# Patient Record
Sex: FEMALE | Race: WHITE | NOT HISPANIC OR LATINO | Employment: OTHER | URBAN - METROPOLITAN AREA
[De-identification: names, ages, dates, MRNs, and addresses within clinical notes are randomized per-mention and may not be internally consistent; named-entity substitution may affect disease eponyms.]

---

## 2017-04-19 ENCOUNTER — ALLSCRIPTS OFFICE VISIT (OUTPATIENT)
Dept: OTHER | Facility: OTHER | Age: 72
End: 2017-04-19

## 2017-04-19 ENCOUNTER — TRANSCRIBE ORDERS (OUTPATIENT)
Dept: ADMINISTRATIVE | Facility: HOSPITAL | Age: 72
End: 2017-04-19

## 2017-04-19 DIAGNOSIS — R39.15 URGENCY OF URINATION: ICD-10-CM

## 2017-04-19 DIAGNOSIS — N28.1 ACQUIRED CYST OF KIDNEY: Primary | ICD-10-CM

## 2017-04-19 LAB
CLARITY UR: NORMAL
COLOR UR: YELLOW
GLUCOSE (HISTORICAL): NORMAL
HGB UR QL STRIP.AUTO: NORMAL
KETONES UR STRIP-MCNC: NORMAL MG/DL
LEUKOCYTE ESTERASE UR QL STRIP: NORMAL
NITRITE UR QL STRIP: NORMAL
PH UR STRIP.AUTO: 6 [PH]
PROT UR STRIP-MCNC: NORMAL MG/DL
SP GR UR STRIP.AUTO: 1.02

## 2017-09-25 ENCOUNTER — APPOINTMENT (OUTPATIENT)
Dept: LAB | Facility: MEDICAL CENTER | Age: 72
End: 2017-09-25
Payer: COMMERCIAL

## 2017-09-25 ENCOUNTER — OFFICE VISIT (OUTPATIENT)
Dept: URGENT CARE | Facility: MEDICAL CENTER | Age: 72
End: 2017-09-25
Payer: COMMERCIAL

## 2017-09-25 DIAGNOSIS — R35.0 FREQUENCY OF MICTURITION: ICD-10-CM

## 2017-09-25 PROCEDURE — 99213 OFFICE O/P EST LOW 20 MIN: CPT

## 2017-09-25 PROCEDURE — 87086 URINE CULTURE/COLONY COUNT: CPT

## 2017-09-25 PROCEDURE — 87077 CULTURE AEROBIC IDENTIFY: CPT

## 2017-09-25 PROCEDURE — 87186 SC STD MICRODIL/AGAR DIL: CPT

## 2017-09-27 LAB — BACTERIA UR CULT: NORMAL

## 2017-10-10 ENCOUNTER — HOSPITAL ENCOUNTER (OUTPATIENT)
Dept: RADIOLOGY | Facility: MEDICAL CENTER | Age: 72
Discharge: HOME | End: 2017-10-10
Payer: COMMERCIAL

## 2017-10-10 DIAGNOSIS — N28.1 ACQUIRED CYST OF KIDNEY: ICD-10-CM

## 2017-10-10 PROCEDURE — 76770 US EXAM ABDO BACK WALL COMP: CPT

## 2017-11-07 ENCOUNTER — GENERIC CONVERSION - ENCOUNTER (OUTPATIENT)
Dept: OTHER | Facility: OTHER | Age: 72
End: 2017-11-07

## 2018-01-03 ENCOUNTER — GENERIC CONVERSION - ENCOUNTER (OUTPATIENT)
Dept: OTHER | Facility: OTHER | Age: 73
End: 2018-01-03

## 2018-01-03 DIAGNOSIS — R35.0 FREQUENCY OF MICTURITION: ICD-10-CM

## 2018-01-03 DIAGNOSIS — R39.15 URGENCY OF URINATION: ICD-10-CM

## 2018-01-03 DIAGNOSIS — R39.9 UNSPECIFIED SYMPTOMS AND SIGNS INVOLVING THE GENITOURINARY SYSTEM: ICD-10-CM

## 2018-01-12 VITALS
HEIGHT: 63 IN | DIASTOLIC BLOOD PRESSURE: 88 MMHG | SYSTOLIC BLOOD PRESSURE: 126 MMHG | HEART RATE: 72 BPM | BODY MASS INDEX: 22.95 KG/M2 | WEIGHT: 129.5 LBS

## 2018-01-22 VITALS
HEIGHT: 63 IN | BODY MASS INDEX: 23.74 KG/M2 | HEART RATE: 72 BPM | SYSTOLIC BLOOD PRESSURE: 126 MMHG | WEIGHT: 134 LBS | DIASTOLIC BLOOD PRESSURE: 70 MMHG

## 2018-07-17 ENCOUNTER — OFFICE VISIT (OUTPATIENT)
Dept: CARDIOLOGY CLINIC | Facility: CLINIC | Age: 73
End: 2018-07-17
Payer: COMMERCIAL

## 2018-07-17 VITALS
SYSTOLIC BLOOD PRESSURE: 118 MMHG | OXYGEN SATURATION: 97 % | DIASTOLIC BLOOD PRESSURE: 68 MMHG | HEART RATE: 64 BPM | BODY MASS INDEX: 23.19 KG/M2 | WEIGHT: 130.9 LBS

## 2018-07-17 DIAGNOSIS — R06.02 SHORTNESS OF BREATH: ICD-10-CM

## 2018-07-17 DIAGNOSIS — R07.9 CHEST PAIN, UNSPECIFIED TYPE: Primary | ICD-10-CM

## 2018-07-17 PROCEDURE — 93000 ELECTROCARDIOGRAM COMPLETE: CPT | Performed by: INTERNAL MEDICINE

## 2018-07-17 PROCEDURE — 99204 OFFICE O/P NEW MOD 45 MIN: CPT | Performed by: INTERNAL MEDICINE

## 2018-07-17 RX ORDER — BIOTIN 1 MG
1 TABLET ORAL DAILY
COMMUNITY
End: 2020-11-18 | Stop reason: ALTCHOICE

## 2018-07-17 RX ORDER — LUTEIN 6 MG
20 TABLET ORAL DAILY
COMMUNITY
End: 2020-11-18 | Stop reason: ALTCHOICE

## 2018-07-17 RX ORDER — METOPROLOL SUCCINATE 50 MG/1
1 TABLET, EXTENDED RELEASE ORAL
COMMUNITY
End: 2019-04-04 | Stop reason: SDUPTHER

## 2018-07-17 RX ORDER — NIACIN 500 MG
500 TABLET ORAL
COMMUNITY

## 2018-07-17 RX ORDER — AMPICILLIN TRIHYDRATE 250 MG
2 CAPSULE ORAL DAILY
COMMUNITY
Start: 2008-03-12

## 2018-07-17 RX ORDER — BIOTIN 10000 MCG
1 CAPSULE ORAL DAILY
COMMUNITY
End: 2020-11-18 | Stop reason: ALTCHOICE

## 2018-07-17 RX ORDER — PROPRANOLOL/HYDROCHLOROTHIAZID 40 MG-25MG
500 TABLET ORAL DAILY
COMMUNITY
End: 2020-11-18 | Stop reason: ALTCHOICE

## 2018-07-17 RX ORDER — BENAZEPRIL HYDROCHLORIDE 40 MG/1
2 TABLET, FILM COATED ORAL
COMMUNITY
Start: 2018-05-21 | End: 2020-05-26 | Stop reason: SDUPTHER

## 2018-07-17 RX ORDER — ESCITALOPRAM OXALATE 10 MG/1
1 TABLET ORAL
COMMUNITY
End: 2021-05-18 | Stop reason: SDUPTHER

## 2018-07-17 RX ORDER — LANOLIN ALCOHOL/MO/W.PET/CERES
1000 CREAM (GRAM) TOPICAL DAILY
COMMUNITY
End: 2020-11-18 | Stop reason: ALTCHOICE

## 2018-07-17 RX ORDER — CALCIUM CARBONATE/VITAMIN D3 600 MG-10
1 TABLET ORAL DAILY
COMMUNITY
Start: 2008-03-12 | End: 2020-11-18 | Stop reason: ALTCHOICE

## 2018-07-17 NOTE — PROGRESS NOTES
Cardiology   Royal Spann 81 y o  female MRN: [id number]        Impression:  1  Chest pain - concerning for myocardial ischemia  Will want to schedule exercise nuclear stress test to evaluate for myocardial ischemia  2  Hypertension - good control  3  Dyslipidemia - likely will need to transition to statin  Recommendations:  1  Continue current medications  2  Obtain exercise nuclear stress test   3  Follow up in 2 weeks  HPI: Royal Spann is a 81y o  year old female with a history of hypertension and dyslipidemia who presents for evaluation of chest pain  For past 6 months, has been having tightness in chest when exercising - particularly climbing inclines  No dyspnea or palpitations  Lipid profile demonstrated low HDL and elevated LDL  Review of Systems   Constitutional: Negative  HENT: Negative  Eyes: Negative  Respiratory: Positive for chest tightness  Negative for shortness of breath  Cardiovascular: Negative for chest pain, palpitations and leg swelling  Gastrointestinal: Negative  Endocrine: Negative  Genitourinary: Negative  Musculoskeletal: Negative  Skin: Negative  Allergic/Immunologic: Negative  Neurological: Negative  Hematological: Negative  Psychiatric/Behavioral: Negative  All other systems reviewed and are negative            History   Alcohol Use    Yes     Comment: social - rare      History   Drug Use No     History   Smoking Status    Never Smoker   Smokeless Tobacco    Never Used     Family History   Problem Relation Age of Onset    Hyperlipidemia Mother     Heart attack Father     Other Sister         open heart surgery     Heart murmur Sister     Stroke Sister     Hyperlipidemia Sister         all siblings     Heart attack Brother         open heart surgery     Deep vein thrombosis Brother     Hyperlipidemia Brother     Coronary artery disease Brother         stents placed     Heart attack Paternal Aunt  Heart attack Paternal Uncle     Hypertension Family         all in the family both sides    Anuerysm Neg Hx     Heart failure Neg Hx        Allergies:   Allergies   Allergen Reactions    Sulfa Antibiotics Hives       Medications:     Current Outpatient Prescriptions:     Aspirin (ASPIR-81 PO), Take 81 mg by mouth daily, Disp: , Rfl:     benazepril (LOTENSIN) 40 MG tablet, Take 2 tablets by mouth daily in the early morning, Disp: , Rfl:     Biotin 10 MG CAPS, Take 1 capsule by mouth daily 5000 mcg daily , Disp: , Rfl:     Cholecalciferol (VITAMIN D3) 1000 units CAPS, Take 1 capsule by mouth daily, Disp: , Rfl:     Coenzyme Q10 (COQ-10) 100 MG CAPS, Take 1 capsule by mouth daily, Disp: , Rfl:     cyanocobalamin (VITAMIN B-12) 1,000 mcg tablet, Take 1,000 mcg by mouth daily, Disp: , Rfl:     escitalopram (LEXAPRO) 10 mg tablet, Take 1 tablet by mouth daily at bedtime, Disp: , Rfl:     Lutein 6 MG TABS, Take 20 mg by mouth daily, Disp: , Rfl:     Magnesium Oxide -Mg Supplement 400 MG CAPS, Take 1 capsule by mouth daily, Disp: , Rfl:     metoprolol succinate (TOPROL-XL) 50 mg 24 hr tablet, Take 1 tablet by mouth daily at bedtime, Disp: , Rfl:     niacin 500 mg tablet, Take 500 mg by mouth daily with breakfast, Disp: , Rfl:     Omega 3 1200 MG CAPS, Take 1 capsule by mouth daily, Disp: , Rfl:     Probiotic Product (PROBIOTIC-10) CAPS, Take by mouth daily, Disp: , Rfl:     Red Yeast Rice 600 MG CAPS, Take 2 capsules by mouth daily, Disp: , Rfl:     Turmeric 500 MG CAPS, Take 500 mg by mouth daily, Disp: , Rfl:       Wt Readings from Last 3 Encounters:   07/17/18 59 4 kg (130 lb 14 4 oz)   11/07/17 60 8 kg (134 lb)   04/19/17 58 7 kg (129 lb 8 oz)     Temp Readings from Last 3 Encounters:   12/05/07 97 6 °F (36 4 °C)   09/12/07 (!) 76 °F (24 4 °C)     BP Readings from Last 3 Encounters:   07/17/18 118/68   11/07/17 126/70   04/19/17 126/88     Pulse Readings from Last 3 Encounters:   07/17/18 64 11/07/17 72   04/19/17 72         Physical Exam   Constitutional: She is oriented to person, place, and time  She appears well-developed  HENT:   Head: Atraumatic  Eyes: Pupils are equal, round, and reactive to light  Neck: Normal range of motion  Cardiovascular: Normal rate, regular rhythm and normal heart sounds  Exam reveals no gallop and no friction rub  No murmur heard  Pulmonary/Chest: Effort normal and breath sounds normal  No respiratory distress  She has no wheezes  She has no rales  Abdominal: Soft  Musculoskeletal: Normal range of motion  Neurological: She is alert and oriented to person, place, and time  Skin: Skin is warm and dry  Psychiatric: She has a normal mood and affect           Cardiac testing:   EKG reviewed personally: PASQUALE Varghese Nml

## 2018-07-17 NOTE — PATIENT INSTRUCTIONS
Recommendations:  1  Continue current medications  2  Obtain exercise nuclear stress test   3  Follow up in 2 weeks

## 2018-07-27 ENCOUNTER — HOSPITAL ENCOUNTER (OUTPATIENT)
Dept: NON INVASIVE DIAGNOSTICS | Facility: CLINIC | Age: 73
Discharge: HOME | End: 2018-07-27
Payer: COMMERCIAL

## 2018-07-27 DIAGNOSIS — R07.9 CHEST PAIN, UNSPECIFIED TYPE: ICD-10-CM

## 2018-07-27 LAB
MAX DIASTOLIC BP: 94 MMHG
MAX HEART RATE: 108 BPM
MAX PREDICTED HEART RATE: 150 BPM
MAX. SYSTOLIC BP: 180 MMHG
PROTOCOL NAME: NORMAL
REASON FOR TERMINATION: NORMAL
TARGET HR FORMULA: NORMAL
TIME IN EXERCISE PHASE: NORMAL

## 2018-07-27 PROCEDURE — 78452 HT MUSCLE IMAGE SPECT MULT: CPT

## 2018-07-27 PROCEDURE — 93017 CV STRESS TEST TRACING ONLY: CPT

## 2018-07-27 PROCEDURE — A9502 TC99M TETROFOSMIN: HCPCS

## 2018-07-27 RX ADMIN — REGADENOSON 0.4 MG: 0.08 INJECTION, SOLUTION INTRAVENOUS at 10:43

## 2018-08-08 ENCOUNTER — OFFICE VISIT (OUTPATIENT)
Dept: CARDIOLOGY CLINIC | Facility: CLINIC | Age: 73
End: 2018-08-08
Payer: COMMERCIAL

## 2018-08-08 VITALS
HEART RATE: 70 BPM | SYSTOLIC BLOOD PRESSURE: 136 MMHG | HEIGHT: 63 IN | BODY MASS INDEX: 23.07 KG/M2 | OXYGEN SATURATION: 99 % | WEIGHT: 130.2 LBS | DIASTOLIC BLOOD PRESSURE: 70 MMHG

## 2018-08-08 DIAGNOSIS — I10 ESSENTIAL HYPERTENSION: ICD-10-CM

## 2018-08-08 DIAGNOSIS — R07.9 CHEST PAIN, UNSPECIFIED TYPE: Primary | ICD-10-CM

## 2018-08-08 DIAGNOSIS — R07.89 OTHER CHEST PAIN: ICD-10-CM

## 2018-08-08 PROCEDURE — 99214 OFFICE O/P EST MOD 30 MIN: CPT | Performed by: INTERNAL MEDICINE

## 2018-08-08 RX ORDER — ISOSORBIDE MONONITRATE 30 MG/1
30 TABLET, EXTENDED RELEASE ORAL DAILY
Qty: 30 TABLET | Refills: 5 | Status: SHIPPED | OUTPATIENT
Start: 2018-08-08 | End: 2018-09-05

## 2018-08-08 NOTE — PROGRESS NOTES
Cardiology   Curly Felty 81 y o  female MRN: [id number]        Impression:  1  Chest pain - no evidence of myocardial ischemia  However, may have microvascular ischemia  2  Hypertension - good control  3  Dyslipidemia - likely will need to transition to statin      Recommendations:  1  Start Imdur 30mg daily  2  Continue remainder of medications  3  Follow up in 3 months  HPI: Curly Felty is a 81y o  year old female with a history of hypertension and dyslipidemia who presents for follow up  Had pharmacologic stress test instead of an exercise stress test b/c of elevated blood pressure - no ischemia  Still with exertional chest pain  Review of Systems   Constitutional: Negative  HENT: Negative  Eyes: Negative  Respiratory: Negative for chest tightness and shortness of breath  Cardiovascular: Positive for chest pain  Negative for palpitations and leg swelling  Gastrointestinal: Negative  Endocrine: Negative  Genitourinary: Negative  Musculoskeletal: Negative  Skin: Negative  Allergic/Immunologic: Negative  Neurological: Negative  Hematological: Negative  Psychiatric/Behavioral: Negative  All other systems reviewed and are negative  No past medical history on file  No past surgical history on file    History   Alcohol Use    Yes     Comment: social - rare      History   Drug Use No     History   Smoking Status    Never Smoker   Smokeless Tobacco    Never Used     Family History   Problem Relation Age of Onset    Hyperlipidemia Mother     Heart attack Father     Other Sister         open heart surgery     Heart murmur Sister     Stroke Sister     Hyperlipidemia Sister         all siblings     Heart attack Brother         open heart surgery     Deep vein thrombosis Brother     Hyperlipidemia Brother     Coronary artery disease Brother         stents placed     Heart attack Paternal Aunt     Heart attack Paternal Uncle     Hypertension Family         all in the family both sides    Anuerysm Neg Hx     Heart failure Neg Hx        Allergies:   Allergies   Allergen Reactions    Sulfa Antibiotics Hives       Medications:     Current Outpatient Prescriptions:     Aspirin (ASPIR-81 PO), Take 81 mg by mouth daily, Disp: , Rfl:     benazepril (LOTENSIN) 40 MG tablet, Take 2 tablets by mouth daily in the early morning, Disp: , Rfl:     Biotin 10 MG CAPS, Take 1 capsule by mouth daily 5000 mcg daily , Disp: , Rfl:     Cholecalciferol (VITAMIN D3) 1000 units CAPS, Take 1 capsule by mouth daily, Disp: , Rfl:     Coenzyme Q10 (COQ-10) 100 MG CAPS, Take 1 capsule by mouth daily, Disp: , Rfl:     cyanocobalamin (VITAMIN B-12) 1,000 mcg tablet, Take 1,000 mcg by mouth daily, Disp: , Rfl:     escitalopram (LEXAPRO) 10 mg tablet, Take 1 tablet by mouth daily at bedtime, Disp: , Rfl:     Lutein 6 MG TABS, Take 20 mg by mouth daily, Disp: , Rfl:     Magnesium Oxide -Mg Supplement 400 MG CAPS, Take 1 capsule by mouth daily, Disp: , Rfl:     metoprolol succinate (TOPROL-XL) 50 mg 24 hr tablet, Take 1 tablet by mouth daily at bedtime, Disp: , Rfl:     niacin 500 mg tablet, Take 500 mg by mouth daily with breakfast, Disp: , Rfl:     Omega 3 1200 MG CAPS, Take 1 capsule by mouth daily, Disp: , Rfl:     Probiotic Product (PROBIOTIC-10) CAPS, Take by mouth daily, Disp: , Rfl:     Red Yeast Rice 600 MG CAPS, Take 2 capsules by mouth daily, Disp: , Rfl:     Turmeric 500 MG CAPS, Take 500 mg by mouth daily, Disp: , Rfl:     isosorbide mononitrate (IMDUR) 30 mg 24 hr tablet, Take 1 tablet (30 mg total) by mouth daily, Disp: 30 tablet, Rfl: 5      Wt Readings from Last 3 Encounters:   08/08/18 59 1 kg (130 lb 3 2 oz)   07/17/18 59 4 kg (130 lb 14 4 oz)   11/07/17 60 8 kg (134 lb)     Temp Readings from Last 3 Encounters:   12/05/07 97 6 °F (36 4 °C)   09/12/07 (!) 76 °F (24 4 °C)     BP Readings from Last 3 Encounters:   08/08/18 136/70   07/17/18 118/68   11/07/17 126/70     Pulse Readings from Last 3 Encounters:   08/08/18 70   07/17/18 64   11/07/17 72         Physical Exam   Constitutional: She is oriented to person, place, and time  She appears well-developed  HENT:   Head: Atraumatic  Eyes: EOM are normal    Neck: Normal range of motion  Cardiovascular: Normal rate, regular rhythm and normal heart sounds  Exam reveals no gallop and no friction rub  No murmur heard  Pulmonary/Chest: Effort normal and breath sounds normal  No respiratory distress  She has no wheezes  She has no rales  Abdominal: Soft  Musculoskeletal: Normal range of motion  Neurological: She is alert and oriented to person, place, and time  Skin: Skin is warm and dry  Psychiatric: She has a normal mood and affect

## 2018-08-08 NOTE — PATIENT INSTRUCTIONS
Recommendations:  1  Start Imdur 30mg daily  2  Continue remainder of medications  3  Follow up in 3 months

## 2018-08-15 ENCOUNTER — TELEPHONE (OUTPATIENT)
Dept: CARDIOLOGY CLINIC | Facility: CLINIC | Age: 73
End: 2018-08-15

## 2018-08-15 NOTE — TELEPHONE ENCOUNTER
That is fine  Dr Omid Garcia is on vacation  Please discuss this again with him once he gets back  Thank you

## 2018-08-15 NOTE — TELEPHONE ENCOUNTER
Dr Naila Walter, patient called stating she is experiencing severe headaches, and weakness since start of Isosorbide  She is going to hold it until we get back to her  Please advise

## 2018-11-14 ENCOUNTER — OFFICE VISIT (OUTPATIENT)
Dept: CARDIOLOGY CLINIC | Facility: MEDICAL CENTER | Age: 73
End: 2018-11-14
Payer: COMMERCIAL

## 2018-11-14 VITALS
BODY MASS INDEX: 22.73 KG/M2 | DIASTOLIC BLOOD PRESSURE: 76 MMHG | HEIGHT: 63 IN | HEART RATE: 80 BPM | SYSTOLIC BLOOD PRESSURE: 128 MMHG | WEIGHT: 128.3 LBS | OXYGEN SATURATION: 93 %

## 2018-11-14 DIAGNOSIS — I10 ESSENTIAL HYPERTENSION: Primary | ICD-10-CM

## 2018-11-14 DIAGNOSIS — R07.89 OTHER CHEST PAIN: ICD-10-CM

## 2018-11-14 DIAGNOSIS — I73.9 PAD (PERIPHERAL ARTERY DISEASE) (HCC): ICD-10-CM

## 2018-11-14 PROCEDURE — 93000 ELECTROCARDIOGRAM COMPLETE: CPT | Performed by: INTERNAL MEDICINE

## 2018-11-14 PROCEDURE — 99214 OFFICE O/P EST MOD 30 MIN: CPT | Performed by: INTERNAL MEDICINE

## 2018-11-14 NOTE — PATIENT INSTRUCTIONS
Recommendations:  1  Continue current medications  2  Fasting lipid panel checked by PCP  3  Will check Vascular studies to evaluate for possible PAD  4  Follow up in 3 months

## 2018-11-14 NOTE — PROGRESS NOTES
Cardiology   Elmira Najera 81 y o  female MRN: [id number]        Impression:  1  Chest pain - no evidence of myocardial ischemia  However, may have microvascular ischemia  2  Hypertension - good control  3  Dyslipidemia - likely will need to transition to statin  4  ? Of PAD  Will need to evaluate      Recommendations:  1  Continue current medications  2  Fasting lipid panel checked by PCP  3  Will check Vascular studies to evaluate for possible PAD  4  Follow up in 3 months  HPI: Elmira Najera is a 81y o  year old female with a history of hypertension and dyslipidemia who presents for follow up  Had pharmacologic stress test instead of an exercise stress test b/c of elevated blood pressure - no ischemia  Unable to tolerate Imdur due to headaches  Able to go to the gym without difficulty  Rare chest pain with significant exertion  Had screening test by [employer] which stated she had "severe LE PAD "        Review of Systems   Constitutional: Negative  HENT: Negative  Eyes: Negative  Respiratory: Negative for chest tightness and shortness of breath  Cardiovascular: Negative for chest pain, palpitations and leg swelling  Gastrointestinal: Negative  Endocrine: Negative  Genitourinary: Negative  Musculoskeletal: Negative  Skin: Negative  Allergic/Immunologic: Negative  Neurological: Negative  Hematological: Negative  Psychiatric/Behavioral: Negative  All other systems reviewed and are negative          History   Alcohol Use    Yes     Comment: social - rare      History   Drug Use No     History   Smoking Status    Never Smoker   Smokeless Tobacco    Never Used     Family History   Problem Relation Age of Onset    Hyperlipidemia Mother     Heart attack Father     Other Sister         open heart surgery     Heart murmur Sister     Stroke Sister     Hyperlipidemia Sister         all siblings     Heart attack Brother         open heart surgery     Deep vein thrombosis Brother     Hyperlipidemia Brother     Coronary artery disease Brother         stents placed     Heart attack Paternal Aunt     Heart attack Paternal Uncle     Hypertension Family         all in the family both sides    Anuerysm Neg Hx     Heart failure Neg Hx        Allergies:   Allergies   Allergen Reactions    Sulfa Antibiotics Hives    Imdur [Isosorbide Nitrate] Headache       Medications:     Current Outpatient Prescriptions:     Aspirin (ASPIR-81 PO), Take 81 mg by mouth daily, Disp: , Rfl:     benazepril (LOTENSIN) 40 MG tablet, Take 2 tablets by mouth daily in the early morning, Disp: , Rfl:     Biotin 10 MG CAPS, Take 1 capsule by mouth daily 5000 mcg daily , Disp: , Rfl:     Cholecalciferol (VITAMIN D3) 1000 units CAPS, Take 1 capsule by mouth daily, Disp: , Rfl:     Coenzyme Q10 (COQ-10) 100 MG CAPS, Take 1 capsule by mouth daily, Disp: , Rfl:     cyanocobalamin (VITAMIN B-12) 1,000 mcg tablet, Take 1,000 mcg by mouth daily, Disp: , Rfl:     escitalopram (LEXAPRO) 10 mg tablet, Take 1 tablet by mouth daily at bedtime, Disp: , Rfl:     Lutein 6 MG TABS, Take 20 mg by mouth daily, Disp: , Rfl:     Magnesium Oxide -Mg Supplement 400 MG CAPS, Take 1 capsule by mouth daily, Disp: , Rfl:     metoprolol succinate (TOPROL-XL) 50 mg 24 hr tablet, Take 1 tablet by mouth daily at bedtime, Disp: , Rfl:     niacin 500 mg tablet, Take 500 mg by mouth daily with breakfast, Disp: , Rfl:     Omega 3 1200 MG CAPS, Take 1 capsule by mouth daily, Disp: , Rfl:     Probiotic Product (PROBIOTIC-10) CAPS, Take by mouth daily, Disp: , Rfl:     Red Yeast Rice 600 MG CAPS, Take 2 capsules by mouth daily, Disp: , Rfl:     Turmeric 500 MG CAPS, Take 500 mg by mouth daily, Disp: , Rfl:       Wt Readings from Last 3 Encounters:   11/14/18 58 2 kg (128 lb 4 8 oz)   08/08/18 59 1 kg (130 lb 3 2 oz)   07/17/18 59 4 kg (130 lb 14 4 oz)     Temp Readings from Last 3 Encounters: 12/05/07 97 6 °F (36 4 °C)   09/12/07 (!) 76 °F (24 4 °C)     BP Readings from Last 3 Encounters:   11/14/18 128/76   08/08/18 136/70   07/17/18 118/68     Pulse Readings from Last 3 Encounters:   11/14/18 80   08/08/18 70   07/17/18 64         Physical Exam   Constitutional: She is oriented to person, place, and time  She appears well-developed  HENT:   Head: Atraumatic  Eyes: EOM are normal    Neck: Normal range of motion  Cardiovascular: Normal rate, regular rhythm and normal heart sounds  Exam reveals no gallop and no friction rub  No murmur heard  Pulmonary/Chest: Effort normal and breath sounds normal  No respiratory distress  She has no wheezes  She has no rales  Abdominal: Soft  Musculoskeletal: Normal range of motion  Neurological: She is alert and oriented to person, place, and time  Skin: Skin is warm and dry  Psychiatric: She has a normal mood and affect             Cardiac testing:   EKG reviewed personally: PASQUALE Herreral

## 2018-11-26 ENCOUNTER — HOSPITAL ENCOUNTER (OUTPATIENT)
Dept: RADIOLOGY | Facility: MEDICAL CENTER | Age: 73
Discharge: HOME | End: 2018-11-26
Payer: COMMERCIAL

## 2018-11-26 DIAGNOSIS — I73.9 PAD (PERIPHERAL ARTERY DISEASE) (HCC): ICD-10-CM

## 2018-11-26 PROCEDURE — 93923 UPR/LXTR ART STDY 3+ LVLS: CPT

## 2018-11-26 PROCEDURE — 93923 UPR/LXTR ART STDY 3+ LVLS: CPT | Performed by: SURGERY

## 2018-12-11 ENCOUNTER — TELEPHONE (OUTPATIENT)
Dept: CARDIOLOGY CLINIC | Facility: CLINIC | Age: 73
End: 2018-12-11

## 2018-12-14 ENCOUNTER — OFFICE VISIT (OUTPATIENT)
Dept: UROLOGY | Facility: CLINIC | Age: 73
End: 2018-12-14
Payer: COMMERCIAL

## 2018-12-14 VITALS
WEIGHT: 129.2 LBS | DIASTOLIC BLOOD PRESSURE: 70 MMHG | BODY MASS INDEX: 22.89 KG/M2 | SYSTOLIC BLOOD PRESSURE: 118 MMHG | HEIGHT: 63 IN

## 2018-12-14 DIAGNOSIS — R31.0 GROSS HEMATURIA: Primary | ICD-10-CM

## 2018-12-14 LAB — POST-VOID RESIDUAL VOLUME, ML POC: 32 ML

## 2018-12-14 PROCEDURE — 99213 OFFICE O/P EST LOW 20 MIN: CPT | Performed by: PHYSICIAN ASSISTANT

## 2018-12-14 PROCEDURE — 51798 US URINE CAPACITY MEASURE: CPT | Performed by: PHYSICIAN ASSISTANT

## 2018-12-14 NOTE — PROGRESS NOTES
1  Gross hematuria  POCT Measure PVR    Urine culture    Basic metabolic panel    CT renal protocol    Cystoscopy       Assessment and plan:       1  Renal cyst -- managed by Dr Ngozi Kelly  2  History of nephrolithiasis  3  Gross hematuria    Patient has new onset hematuria which have resolved over the past day  Patient was instructed to complete antibiotics as per primary care as I do not have the results of her urine studies to indicated if urinary infection was present  Reviewed that her last hematuria workup was over 5 years ago  Given new onset gross hematuria, we will re-initiate her hematuria workup with a CT urogram followed by a cystoscopy  Patient was provided with a lab slip for urine culture to be performed after completion of antibiotic to ensure resolution of urinary infection  She was instructed to remain well hydrated, avoid strenuous activity, and avoid constipation  She is aware to contact us with any recurrence of hematuria in the meantime  All questions answered  Yolanda Rock PA-C      Chief Complaint     F/u gross heamturia    History of Present Illness     Rashad Abreu is a 81 y o  female patient of Dr Randall Su with a history of overactive bladder, renal cysts, and nephrolithiasis presenting for follow up  Patient has a history of complicated left renal cyst since 2008  This was previously followed by another urologist, Dr Rudolph Moreira, and she transferred her care to our practice in April 2017  Patient's last ultrasound was (10/17/17) revealed small left renal cyst measuring 2 x 1 9 x 1 5 cm without any evidence of renal mass  There were possible small bilateral intrarenal calculi on ultrasound  There is no hydronephrosis  Bilateral ureteral jets were detected  She has a history of microscopic hematuria and previously had undergone cystoscopy prior to 2014      Patient states that yesterday she had 2 episodes of gross hematuria and had small clots evacuated in her urine   She was having urinary pressure  Her primary care initiated her on nitrofurantoin  Her hematuria has resolved overnight  She denies any dysuria, flank pain, fevers, or chills  Postvoid residual in the office today reveals 32 mL  Laboratory         Review of Systems     Review of Systems   Constitutional: Negative for activity change, appetite change, chills, diaphoresis, fatigue, fever and unexpected weight change  Respiratory: Negative for chest tightness and shortness of breath  Cardiovascular: Negative for chest pain, palpitations and leg swelling  Gastrointestinal: Negative for abdominal distention, abdominal pain, constipation, diarrhea, nausea and vomiting  Genitourinary: Positive for difficulty urinating and hematuria  Negative for decreased urine volume, dysuria, enuresis, flank pain, frequency, genital sores and urgency  Musculoskeletal: Negative for back pain, gait problem and myalgias  Skin: Negative for color change, pallor, rash and wound  Psychiatric/Behavioral: Negative for behavioral problems  The patient is not nervous/anxious  Allergies     Allergies   Allergen Reactions    Sulfa Antibiotics Hives    Imdur [Isosorbide Nitrate] Headache       Physical Exam     Physical Exam   Constitutional: She is oriented to person, place, and time  She appears well-developed and well-nourished  No distress  HENT:   Head: Normocephalic and atraumatic  Eyes: Conjunctivae are normal    Neck: Normal range of motion  No tracheal deviation present  Pulmonary/Chest: Effort normal    Musculoskeletal: Normal range of motion  She exhibits no edema or deformity  Neurological: She is alert and oriented to person, place, and time  Skin: Skin is warm and dry  She is not diaphoretic  No erythema  No pallor  Psychiatric: She has a normal mood and affect   Her behavior is normal          Vital Signs     Vitals:    12/14/18 1427   BP: 118/70   Weight: 58 6 kg (129 lb 3 2 oz) Height: 5' 3" (1 6 m)         Current Medications       Current Outpatient Prescriptions:     Aspirin (ASPIR-81 PO), Take 81 mg by mouth daily, Disp: , Rfl:     benazepril (LOTENSIN) 40 MG tablet, Take 2 tablets by mouth daily in the early morning, Disp: , Rfl:     Biotin 10 MG CAPS, Take 1 capsule by mouth daily 5000 mcg daily , Disp: , Rfl:     Cholecalciferol (VITAMIN D3) 1000 units CAPS, Take 1 capsule by mouth daily, Disp: , Rfl:     Coenzyme Q10 (COQ-10) 100 MG CAPS, Take 1 capsule by mouth daily, Disp: , Rfl:     cyanocobalamin (VITAMIN B-12) 1,000 mcg tablet, Take 1,000 mcg by mouth daily, Disp: , Rfl:     escitalopram (LEXAPRO) 10 mg tablet, Take 1 tablet by mouth daily at bedtime, Disp: , Rfl:     Lutein 6 MG TABS, Take 20 mg by mouth daily, Disp: , Rfl:     Magnesium Oxide -Mg Supplement 400 MG CAPS, Take 1 capsule by mouth daily, Disp: , Rfl:     metoprolol succinate (TOPROL-XL) 50 mg 24 hr tablet, Take 1 tablet by mouth daily at bedtime, Disp: , Rfl:     niacin 500 mg tablet, Take 500 mg by mouth daily with breakfast, Disp: , Rfl:     Omega 3 1200 MG CAPS, Take 1 capsule by mouth daily, Disp: , Rfl:     Probiotic Product (PROBIOTIC-10) CAPS, Take by mouth daily, Disp: , Rfl:     Red Yeast Rice 600 MG CAPS, Take 2 capsules by mouth daily, Disp: , Rfl:     Turmeric 500 MG CAPS, Take 500 mg by mouth daily, Disp: , Rfl:       Active Problems     Patient Active Problem List   Diagnosis    Other chest pain    Essential hypertension    [facility]per[facility] artery disease[facility] ([facility] [address] )    Gross hematuria       Past Medical History     History reviewed  No pertinent past medical history  Surgical History     History reviewed  No pertinent surgical history        Family History     Family History   Problem Relation Age of Onset    Hyperlipidemia Mother     Heart attack Father     Other Sister         open heart surgery     Heart murmur Sister     Stroke Sister     Hyperlipidemia Sister all siblings     Heart attack Brother         open heart surgery     Deep vein thrombosis Brother     Hyperlipidemia Brother     Coronary artery disease Brother         stents placed     Heart attack Paternal Aunt     Heart attack Paternal Uncle     Hypertension Family         all in the family both sides    Anuerysm Neg Hx     Heart failure Neg Hx        Social History     Social History       Radiology

## 2019-01-16 LAB
BUN SERPL-MCNC: 20 MG/DL (ref 7–25)
BUN/CREAT SERPL: NORMAL (CALC) (ref 6–22)
CALCIUM SERPL-MCNC: 9.3 MG/DL (ref 8.6–10.4)
CHLORIDE SERPL-SCNC: 104 MMOL/L (ref 98–110)
CO2 SERPL-SCNC: 30 MMOL/L (ref 20–32)
CREAT SERPL-MCNC: 0.9 MG/DL (ref 0.6–0.93)
GLUCOSE SERPL-MCNC: 87 MG/DL (ref 65–99)
POTASSIUM SERPL-SCNC: 4.2 MMOL/L (ref 3.5–5.3)
SL AMB EGFR AFRICAN AMERICAN: 75 ML/MIN/1.73M2
SL AMB EGFR NON AFRICAN AMERICAN: 64 ML/MIN/1.73M2
SODIUM SERPL-SCNC: 139 MMOL/L (ref 135–146)

## 2019-01-21 ENCOUNTER — HOSPITAL ENCOUNTER (OUTPATIENT)
Dept: CT IMAGING | Facility: HOSPITAL | Age: 74
Discharge: HOME | End: 2019-01-21
Payer: COMMERCIAL

## 2019-01-21 DIAGNOSIS — R31.0 GROSS HEMATURIA: ICD-10-CM

## 2019-01-21 PROCEDURE — 74178 CT ABD&PLV WO CNTR FLWD CNTR: CPT

## 2019-01-21 RX ADMIN — IOHEXOL 100 ML: 350 INJECTION, SOLUTION INTRAVENOUS at 11:37

## 2019-01-23 ENCOUNTER — TELEPHONE (OUTPATIENT)
Dept: UROLOGY | Facility: MEDICAL CENTER | Age: 74
End: 2019-01-23

## 2019-01-23 NOTE — TELEPHONE ENCOUNTER
[facility] calling to make Hansa Hays PA-C aware pt's CT result 1/21 is ready to be viewed in [facility]  Please advise

## 2019-02-07 PROCEDURE — 52000 CYSTOURETHROSCOPY: CPT | Performed by: UROLOGY

## 2019-02-07 NOTE — PROGRESS NOTES
Cystoscopy  Date/Time: 2/7/2019 7:27 AM  Performed by: Carrie Yu by: Oscar Macdonald     Procedure details: cystoscopy    Patient tolerance: Patient tolerated the procedure well with no immediate complications          Gayatri Schmidt is a 72 y o  female patient of Dr Arianna Liriano with a history of overactive bladder, renal cysts, and nephrolithiasis presenting for follow up       Patient has a history of complicated left renal cyst since 2008  This was previously followed by another urologist, Dr Asia Strong, and she transferred her care to our practice in April 2017  Patient's last ultrasound was (10/17/17) revealed small left renal cyst measuring 2 x 1 9 x 1 5 cm without any evidence of renal mass  There were possible small bilateral intrarenal calculi on ultrasound  There is no hydronephrosis  Bilateral ureteral jets were detected      She has a history of microscopic hematuria and previously had undergone cystoscopy prior to 2014      Patient states that yesterday she had 2 episodes of gross hematuria and had small clots evacuated in her urine  She was having urinary pressure  Her primary care initiated her on nitrofurantoin  Her hematuria has resolved overnight  She denies any dysuria, flank pain, fevers, or chills  CT ABDOMEN AND PELVIS WITH AND WITHOUT IV CONTRAST     INDICATION:   R31 0: Gross hematuria      COMPARISON:  Renal ultrasound from 10/10/2017      TECHNIQUE: Initial CT of the abdomen and pelvis was performed without intravenous contrast   Subsequent dynamic CT evaluation of the abdomen and pelvis was performed after the administration of intravenous contrast in both nephrographic and delayed   phases after the administration of intravenous contrast   Axial, sagittal, and coronal 2D reformatted images were created from the source data and submitted for interpretation       Radiation dose length product (DLP) for this visit:  736 mGy-cm     This examination, like all CT scans performed in the [facility], was performed utilizing techniques to minimize radiation dose exposure, including the use of iterative   reconstruction and automated exposure control      IV Contrast:  100 mL of iohexol (OMNIPAQUE)  Enteric Contrast:  Enteric contrast was not administered      FINDINGS:     ABDOMEN     RIGHT KIDNEY AND URETER:  There is a too small to characterize but likely benign well-defined hypodense lesion in the right kidney midpole (series 3 image 58 )  No solid renal mass  No detectable urothelial mass  However, please see below regarding a retroperitoneal nodular mass displacing the right ureter  No hydronephrosis or hydroureter  No urinary tract calculi  No perinephric collection      LEFT KIDNEY AND URETER:  Again seen is a 2 0 x 1 7 cm left kidney lower pole exophytic lesion, which measures 40 Hounsfield units on the noncontrast CT, and does not demonstrates significant enhancement postcontrast   This is unchanged in size compared to the 10/10/2017   ultrasound, where it appeared simple cystic  This is consistent with a hemorrhagic or proteinaceous cyst (series 2 image 45 )  No solid renal mass  No detectable urothelial mass  No hydronephrosis or hydroureter  No urinary tract calculi  No perinephric collection      URINARY BLADDER:  No bladder wall mass  No calculi         LOWER CHEST:  No clinically significant abnormality identified in the visualized lower chest      LIVER/BILIARY TREE:  Unremarkable      GALLBLADDER:  No calcified gallstones   No pericholecystic inflammatory change      SPLEEN:  Unremarkable      PANCREAS:  Unremarkable      ADRENAL GLANDS:  Unremarkable      STOMACH AND BOWEL:  There is colonic diverticulosis without evidence of acute diverticulitis      ABDOMINOPELVIC CAVITY:  There is a 1 5 x 1 4 x 2 6 cm retroperitoneal nodular lesion, located right of the IVC, posterior to the duodenum, and anterior to the psoas muscle (series 5 images 66 through 68 )  Of note, the right ureter is displaced toward   the left of the lesion      VESSELS:  Unremarkable for patient's age      PELVIS     REPRODUCTIVE ORGANS:  Unremarkable for patient's age      APPENDIX: No findings to suggest appendicitis      ABDOMINAL WALL/INGUINAL REGIONS:  Unremarkable      OSSEOUS STRUCTURES:  No acute fracture or destructive osseous lesion      IMPRESSION:     There are no renal parenchymal or urothelial lesions that are suspicious for pneumonia is present, however there is a 1 5 x 1 4 x 2 6 cm retroperitoneal nodular lesion, located right of the IVC, posterior to the duodenum, and anterior to the psoas muscle   (series 5 images 66 through 76 )  Of note, the right ureter is displaced toward the left of the lesion  Either PET/CT or CT-guided biopsy is recommended for further evaluation of this finding      The study was marked in EPIC for significant notification      PROCEDURE:  CYSTOSCOPY    With patient properly identified and informed consent obtained she was placed supine in the procedure suite  She was sterilely prepped and draped in the usual fashion  10 cc viscous lidocaine was administered per urethra  Flexible cystourethroscopy was done with the 16 [place] Janna scope  Inspection of the bladder revealed no significant findings  Ureteral orifices were normal in caliber and location  No evidence of bladder tumor  Urine sample was obtained for urinary cytology  Patient tolerated the procedure well  PLAN    Hematuria workup performed in the setting of recurrent hemorrhagic cystitis shows no other abnormal findings within the urinary tract  Urinalysis should be repeated in 1 year's time  I reviewed also the results of the CT scan which demonstrates the presence of a retroperitoneal growth in proximity to the pancreas and duodenum  Uncertain as to the origin of this lesion, however I do feel a warrants further investigation  Referral to Surgical Oncology provided today  All questions answered at this time

## 2019-02-08 ENCOUNTER — PROCEDURE VISIT (OUTPATIENT)
Dept: UROLOGY | Facility: CLINIC | Age: 74
End: 2019-02-08
Payer: COMMERCIAL

## 2019-02-08 VITALS
WEIGHT: 130.2 LBS | SYSTOLIC BLOOD PRESSURE: 116 MMHG | HEIGHT: 63 IN | BODY MASS INDEX: 23.07 KG/M2 | HEART RATE: 63 BPM | DIASTOLIC BLOOD PRESSURE: 80 MMHG

## 2019-02-08 DIAGNOSIS — R31.0 GROSS HEMATURIA: Primary | ICD-10-CM

## 2019-02-08 LAB
SL AMB  POCT GLUCOSE, UA: ABNORMAL
SL AMB LEUKOCYTE ESTERASE,UA: ABNORMAL
SL AMB POCT BILIRUBIN,UA: ABNORMAL
SL AMB POCT BLOOD,UA: 1
SL AMB POCT CLARITY,UA: CLEAR
SL AMB POCT COLOR,UA: YELLOW
SL AMB POCT KETONES,UA: ABNORMAL
SL AMB POCT NITRITE,UA: ABNORMAL
SL AMB POCT PH,UA: 5
SL AMB POCT SPECIFIC GRAVITY,UA: 1.01
SL AMB POCT URINE PROTEIN: ABNORMAL
SL AMB POCT UROBILINOGEN: ABNORMAL

## 2019-02-08 PROCEDURE — 81002 URINALYSIS NONAUTO W/O SCOPE: CPT | Performed by: UROLOGY

## 2019-02-08 RX ORDER — NITROFURANTOIN 25; 75 MG/1; MG/1
CAPSULE ORAL
COMMUNITY
Start: 2018-12-13 | End: 2019-04-17 | Stop reason: ALTCHOICE

## 2019-02-11 PROBLEM — R19.00 RETROPERITONEAL MASS: Status: ACTIVE | Noted: 2019-02-11

## 2019-02-15 ENCOUNTER — CONSULT (OUTPATIENT)
Dept: SURGICAL ONCOLOGY | Facility: CLINIC | Age: 74
End: 2019-02-15
Payer: COMMERCIAL

## 2019-02-15 VITALS
WEIGHT: 129 LBS | DIASTOLIC BLOOD PRESSURE: 80 MMHG | TEMPERATURE: 97.8 F | BODY MASS INDEX: 22.86 KG/M2 | SYSTOLIC BLOOD PRESSURE: 142 MMHG | HEART RATE: 82 BPM | RESPIRATION RATE: 14 BRPM | HEIGHT: 63 IN

## 2019-02-15 DIAGNOSIS — R19.00 RETROPERITONEAL MASS: Primary | ICD-10-CM

## 2019-02-15 DIAGNOSIS — R31.0 GROSS HEMATURIA: ICD-10-CM

## 2019-02-15 PROCEDURE — 99204 OFFICE O/P NEW MOD 45 MIN: CPT | Performed by: SURGERY

## 2019-02-15 NOTE — LETTER
February 15, 2019     Pat Roche MD  [facility]    Patient: Sarah Wood   YOB: 1945   Date of Visit: 2/15/2019       Dear Dr Michael Finely: Thank you for referring Maria Hunt to me for evaluation  Below are my notes for this consultation  If you have questions, please do not hesitate to call me  I look forward to following your patient along with you  Sincerely,        Ulises Burgos MD        CC: MD Ulises Adams MD  2/15/2019  2:37 PM  Sign at close encounter               [facility] [place]  [address]  [address] [address]  [facility]  1945  [id number]  [address] [facility]  [address]  [place] [address]   [phone]    Diagnoses and all orders for this visit:    Retroperitoneal mass  -     Ambulatory referral to Gastroenterology; Future    Gross hematuria  -     Ambulatory referral to Surgical Oncology        Chief Complaint   Patient presents with    Retroperitoneal nodule     Pt reports 2 months of hematuria, urology is referring to Dr Quynh Funez for mass found on CT scan  Return in about 1 month (around 3/15/2019) for Office Visit  No history exists  History of Present Illness:  21-year-old female who has had bouts of hematuria  As part of this workup, she had a CT  This revealed a 1 5 x 1 4 x 2 6 cm retroperitoneal nodular lesion  This was posterior to the duodenum and to the right of the IVC  The right ureter was displaced to the left of the lesion  I personally reviewed the films  Patient comes in to discuss further therapy  She has no personal history of cancer  There is a family history of a sister with breast cancer    She denies any abdominal pain, nausea, vomiting, early satiety or unintentional weight loss  Review of Systems  Complete ROS Surg Onc:   Constitutional: The patient denies new or recent history of general fatigue, no recent weight loss, no change in appetite  Eyes: No complaints of visual problems, no scleral icterus  ENT: no complaints of ear pain, no hoarseness, no difficulty swallowing,  no tinnitus and no new masses in head, oral cavity, or neck  Cardiovascular: No complaints of chest pain, no palpitations, no ankle edema  Respiratory: No complaints of shortness of breath, no cough  Gastrointestinal: No complaints of jaundice, no bloody stools, no pale stools  Genitourinary: No complaints of dysuria, no nocturia, no frequent urination, no urethral discharge  Positive for hematuria   Musculoskeletal: No complaints of weakness, paralysis, joint stiffness or arthralgias  Integumentary: No complaints of rash, no new lesions  Neurological: No complaints of convulsions, no seizures, no dizziness  Hematologic/Lymphatic: No complaints of easy bruising  Endocrine:  No hot or cold intolerance  No polydipsia, polyphagia, or polyuria  Allergy/immunology:  No environmental allergies  No food allergies  Not immunocompromised  Skin:  No pallor or rash  No wound  Patient Active Problem List   Diagnosis    Other chest pain    Essential hypertension    PAD [facility]peripheral artery disease) ([facility] [address] [facility]    Gross hematuria    Anxiety disorder    Hyperlipidemia    Hypothyroidism    Retroperitoneal mass     History reviewed  No pertinent past medical history  History reviewed  No pertinent surgical history    Family History   Problem Relation Age of Onset    Hyperlipidemia Mother     Heart attack Father     Other Sister         open heart surgery     Heart murmur Sister     Stroke Sister     Hyperlipidemia Sister         all siblings     Breast cancer Sister 79    Heart attack Brother         open heart surgery     Deep vein thrombosis Brother  Hyperlipidemia Brother     Coronary artery disease Brother         stents placed     Heart attack Paternal Aunt     Heart attack Paternal Uncle     Hypertension Family         all in the family both sides    Anuerysm Neg Hx     Heart failure Neg Hx      Social History     Socioeconomic History    Marital status: /Civil Union     Spouse name: Not on file    Number of children: Not on file    Years of education: Not on file    Highest education level: Not on file   Occupational History    Not on file   Social Needs    Financial resource strain: Not on file    Food insecurity:     Worry: Not on file     Inability: Not on file    Transportation needs:     Medical: Not on file     Non-medical: Not on file   Tobacco Use    Smoking status: Never Smoker    Smokeless tobacco: Never Used   Substance and Sexual Activity    Alcohol use: Yes     Comment: social - rare     Drug use: No    Sexual activity: Not on file   Lifestyle    Physical activity:     Days per week: Not on file     Minutes per session: Not on file    Stress: Not on file   Relationships    Social connections:     Talks on phone: Not on file     Gets together: Not on file     Attends Baptism service: Not on file     Active member of club or organization: Not on file     Attends meetings of clubs or organizations: Not on file     Relationship status: Not on file    Intimate partner violence:     Fear of current or ex partner: Not on file     Emotionally abused: Not on file     Physically abused: Not on file     Forced sexual activity: Not on file   Other Topics Concern    Not on file   Social History Narrative    Not on file       Current Outpatient Medications:     Aspirin (ASPIR-81 PO), Take 81 mg by mouth daily, Disp: , Rfl:     benazepril (LOTENSIN) 40 MG tablet, Take 2 tablets by mouth daily in the early morning, Disp: , Rfl:     Biotin 10 MG CAPS, Take 1 capsule by mouth daily 5000 mcg daily , Disp: , Rfl:    Cholecalciferol (VITAMIN D3) 1000 units CAPS, Take 1 capsule by mouth daily, Disp: , Rfl:     Coenzyme Q10 (COQ-10) 100 MG CAPS, Take 1 capsule by mouth daily, Disp: , Rfl:     cyanocobalamin (VITAMIN B-12) 1,000 mcg tablet, Take 1,000 mcg by mouth daily, Disp: , Rfl:     escitalopram (LEXAPRO) 10 mg tablet, Take 1 tablet by mouth daily at bedtime, Disp: , Rfl:     Lutein 6 MG TABS, Take 20 mg by mouth daily, Disp: , Rfl:     Magnesium Oxide -Mg Supplement 400 MG CAPS, Take 1 capsule by mouth daily, Disp: , Rfl:     metoprolol succinate (TOPROL-XL) 50 mg 24 hr tablet, Take 1 tablet by mouth daily at bedtime, Disp: , Rfl:     niacin 500 mg tablet, Take 500 mg by mouth daily with breakfast, Disp: , Rfl:     nitrofurantoin (MACROBID) 100 mg capsule, , Disp: , Rfl:     Omega 3 1200 MG CAPS, Take 1 capsule by mouth daily, Disp: , Rfl:     Probiotic Product (PROBIOTIC-10) CAPS, Take by mouth daily, Disp: , Rfl:     Red Yeast Rice 600 MG CAPS, Take 2 capsules by mouth daily, Disp: , Rfl:     Turmeric 500 MG CAPS, Take 500 mg by mouth daily, Disp: , Rfl:   Allergies   Allergen Reactions    Imdur [Isosorbide Nitrate] Headache    Sulfa Antibiotics Hives    Ciprofloxacin Rash and GI Intolerance     Vitals:    02/15/19 1402   BP: 142/80   Pulse: 82   Resp: 14   Temp: 97 8 °F (36 6 °C)       Physical Exam   Constitutional: General appearance: The Patient is well-developed and well-nourished who appears the stated age in no acute distress  Patient is pleasant and talkative  HEENT:  Normocephalic  Sclerae are anicteric  Mucous membranes are moist  Neck is supple without adenopathy  No JVD  Chest: The lungs are clear to auscultation  Cardiac: Heart is regular rate  Abdomen: Abdomen is soft, non-tender, non-distended and without masses  Extremities: There is no clubbing or cyanosis  There is no edema  Symmetric    Neuro: Grossly nonfocal  Gait is normal      Lymphatic: No evidence of cervical adenopathy bilaterally  No evidence of axillary adenopathy bilaterally  No evidence of inguinal adenopathy bilaterally  Skin: Warm, anicteric  Psych:  Patient is pleasant and talkative  Breasts:      Pathology:  [unfilled]    Labs:      Imaging  Ct Renal Protocol    Result Date: 1/23/2019  Narrative: CT ABDOMEN AND PELVIS WITH AND WITHOUT IV CONTRAST INDICATION:   R31 0: Gross hematuria  COMPARISON:  Renal ultrasound from 10/10/2017  TECHNIQUE: Initial CT of the abdomen and pelvis was performed without intravenous contrast   Subsequent dynamic CT evaluation of the abdomen and pelvis was performed after the administration of intravenous contrast in both nephrographic and delayed phases after the administration of intravenous contrast   Axial, sagittal, and coronal 2D reformatted images were created from the source data and submitted for interpretation  Radiation dose length product (DLP) for this visit:  736 mGy-cm   This examination, like all CT scans performed in the [facility], was performed utilizing techniques to minimize radiation dose exposure, including the use of iterative reconstruction and automated exposure control  IV Contrast:  100 mL of iohexol (OMNIPAQUE) Enteric Contrast:  Enteric contrast was not administered  FINDINGS: ABDOMEN RIGHT KIDNEY AND URETER: There is a too small to characterize but likely benign well-defined hypodense lesion in the right kidney midpole (series 3 image 58 ) No solid renal mass  No detectable urothelial mass  However, please see below regarding a retroperitoneal nodular mass displacing the right ureter  No hydronephrosis or hydroureter  No urinary tract calculi  No perinephric collection   LEFT KIDNEY AND URETER: Again seen is a 2 0 x 1 7 cm left kidney lower pole exophytic lesion, which measures 40 Hounsfield units on the noncontrast CT, and does not demonstrates significant enhancement postcontrast   This is unchanged in size compared to the 10/10/2017 ultrasound, where it appeared simple cystic  This is consistent with a hemorrhagic or proteinaceous cyst (series 2 image 45 ) No solid renal mass  No detectable urothelial mass  No hydronephrosis or hydroureter  No urinary tract calculi  No perinephric collection  URINARY BLADDER: No bladder wall mass  No calculi  LOWER CHEST:  No clinically significant abnormality identified in the visualized lower chest  LIVER/BILIARY TREE:  Unremarkable  GALLBLADDER:  No calcified gallstones  No pericholecystic inflammatory change  SPLEEN:  Unremarkable  PANCREAS:  Unremarkable  ADRENAL GLANDS:  Unremarkable  STOMACH AND BOWEL:  There is colonic diverticulosis without evidence of acute diverticulitis  ABDOMINOPELVIC CAVITY:  There is a 1 5 x 1 4 x 2 6 cm retroperitoneal nodular lesion, located right of the IVC, posterior to the duodenum, and anterior to the psoas muscle (series 5 images 66 through 76 )  Of note, the right ureter is displaced toward the left of the lesion  VESSELS:  Unremarkable for patient's age  PELVIS REPRODUCTIVE ORGANS:  Unremarkable for patient's age  APPENDIX: No findings to suggest appendicitis  ABDOMINAL WALL/INGUINAL REGIONS:  Unremarkable  OSSEOUS STRUCTURES:  No acute fracture or destructive osseous lesion  Impression: There are no renal parenchymal or urothelial lesions that are suspicious for pneumonia is present, however there is a 1 5 x 1 4 x 2 6 cm retroperitoneal nodular lesion, located right of the IVC, posterior to the duodenum, and anterior to the psoas muscle  (series 5 images 66 through 76 )  Of note, the right ureter is displaced toward the left of the lesion  Either PET/CT or CT-guided biopsy is recommended for further evaluation of this finding  The study was marked in EPIC for significant notification  Workstation performed: [id number]     I reviewed the above laboratory and imaging data  Discussion/Summary:  72-year-old female with a retroperitoneal mass    It is unclear the etiology of this  Urology does not believe that this is the cause of her hematuria  Since this is adjacent to the duodenum, endoscopic ultrasound may be the most reasonable way to perform a biopsy to get a definitive diagnosis  Her  sees [facility]  I will get her set up with them to perform EUS  I will see her back once we have the results  Further recommendations we based on those results  She is agreeable to this plan  All of their questions were answered

## 2019-02-15 NOTE — PROGRESS NOTES
Surgical Oncology Consult       [address] [facility] Rosita Bello  [address]  [place] [address] [facility] [address] [address] DOMI Lainez Flank  1945  [id number]  [address] [facility] Rosita Bello  [address] [facility] [address]  [facility] [address] [facility] [address] [phone]    Diagnoses and all orders for this visit:    Retroperitoneal mass  -     Ambulatory referral to Gastroenterology; Future    Gross hematuria  -     Ambulatory referral to Surgical Oncology        Chief Complaint   Patient presents with    Retroperitoneal nodule     Pt reports 2 months of hematuria, urology is referring to Dr Sabra Frank for mass found on CT scan  Return in about 1 month (around 3/15/2019) for Office Visit  No history exists  History of Present Illness:  65-year-old female who has had bouts of hematuria  As part of this workup, she had a CT  This revealed a 1 5 x 1 4 x 2 6 cm retroperitoneal nodular lesion  This was posterior to the duodenum and to the right of the IVC  The right ureter was displaced to the left of the lesion  I personally reviewed the films  Patient comes in to discuss further therapy  She has no personal history of cancer  There is a family history of a sister with breast cancer  She denies any abdominal pain, nausea, vomiting, early satiety or unintentional weight loss  Review of Systems  Complete ROS Surg Onc:   Constitutional: The patient denies new or recent history of general fatigue, no recent weight loss, no change in appetite  Eyes: No complaints of visual problems, no scleral icterus  ENT: no complaints of ear pain, no hoarseness, no difficulty swallowing,  no tinnitus and no new masses in head, oral cavity, or neck  Cardiovascular: No complaints of chest pain, no palpitations, no ankle edema  Respiratory: No complaints of shortness of breath, no cough  Gastrointestinal: No complaints of jaundice, no bloody stools, no pale stools  Genitourinary: No complaints of dysuria, no nocturia, no frequent urination, no urethral discharge  Positive for hematuria   Musculoskeletal: No complaints of weakness, paralysis, joint stiffness or arthralgias  Integumentary: No complaints of rash, no new lesions  Neurological: No complaints of convulsions, no seizures, no dizziness  Hematologic/Lymphatic: No complaints of easy bruising  Endocrine:  No hot or cold intolerance  No polydipsia, polyphagia, or polyuria  Allergy/immunology:  No environmental allergies  No food allergies  Not immunocompromised  Skin:  No pallor or rash  No wound  Patient Active Problem List   Diagnosis    Other chest pain    Essential hypertension    PAD [facility]peripheral artery disease) ([facility] [address] )    Gross hematuria    Anxiety disorder    Hyperlipidemia    Hypothyroidism    Retroperitoneal mass     History reviewed  No pertinent past medical history  History reviewed  No pertinent surgical history    Family History   Problem Relation Age of Onset    Hyperlipidemia Mother     Heart attack Father     Other Sister         open heart surgery     Heart murmur Sister     Stroke Sister     Hyperlipidemia Sister         all siblings    Elif Riis Breast cancer Sister 79    Heart attack Brother         open heart surgery     Deep vein thrombosis Brother     Hyperlipidemia Brother     Coronary artery disease Brother         stents placed     Heart attack Paternal Aunt     Heart attack Paternal Uncle     Hypertension Family         all in the family both sides    Anuerysm Neg Hx     Heart failure Neg Hx      Social History     Socioeconomic History    Marital status: /Civil Union     Spouse name: Not on file    Number of children: Not on file    Years of education: Not on file    Highest education level: Not on file   Occupational History    Not on file   Social Needs    Financial resource strain: Not on file    Food insecurity:     Worry: Not on file     Inability: Not on file    Transportation needs:     Medical: Not on file     Non-medical: Not on file   Tobacco Use    Smoking status: Never Smoker    Smokeless tobacco: Never Used   Substance and Sexual Activity    Alcohol use: Yes     Comment: social - rare     Drug use: No    Sexual activity: Not on file   Lifestyle    Physical activity:     Days per week: Not on file     Minutes per session: Not on file    Stress: Not on file   Relationships    Social connections:     Talks on phone: Not on file     Gets together: Not on file     Attends Congregational service: Not on file     Active member of club or organization: Not on file     Attends meetings of clubs or organizations: Not on file     Relationship status: Not on file    Intimate partner violence:     Fear of current or ex partner: Not on file     Emotionally abused: Not on file     Physically abused: Not on file     Forced sexual activity: Not on file   Other Topics Concern    Not on file   Social History Narrative    Not on file       Current Outpatient Medications:     Aspirin (ASPIR-81 PO), Take 81 mg by mouth daily, Disp: , Rfl:     benazepril (LOTENSIN) 40 MG tablet, Take 2 tablets by mouth daily in the early morning, Disp: , Rfl:     Biotin 10 MG CAPS, Take 1 capsule by mouth daily 5000 mcg daily , Disp: , Rfl:     Cholecalciferol (VITAMIN D3) 1000 units CAPS, Take 1 capsule by mouth daily, Disp: , Rfl:     Coenzyme Q10 (COQ-10) 100 MG CAPS, Take 1 capsule by mouth daily, Disp: , Rfl:     cyanocobalamin (VITAMIN B-12) 1,000 mcg tablet, Take 1,000 mcg by mouth daily, Disp: , Rfl:     escitalopram (LEXAPRO) 10 mg tablet, Take 1 tablet by mouth daily at bedtime, Disp: , Rfl:     Lutein 6 MG TABS, Take 20 mg by mouth daily, Disp: , Rfl:     Magnesium Oxide -Mg Supplement 400 MG CAPS, Take 1 capsule by mouth daily, Disp: , Rfl:     metoprolol succinate (TOPROL-XL) 50 mg 24 hr tablet, Take 1 tablet by mouth daily at bedtime, Disp: , Rfl:     niacin 500 mg tablet, Take 500 mg by mouth daily with breakfast, Disp: , Rfl:     nitrofurantoin (MACROBID) 100 mg capsule, , Disp: , Rfl:     Omega 3 1200 MG CAPS, Take 1 capsule by mouth daily, Disp: , Rfl:     Probiotic Product (PROBIOTIC-10) CAPS, Take by mouth daily, Disp: , Rfl:     Red Yeast Rice 600 MG CAPS, Take 2 capsules by mouth daily, Disp: , Rfl:     Turmeric 500 MG CAPS, Take 500 mg by mouth daily, Disp: , Rfl:   Allergies   Allergen Reactions    Imdur [Isosorbide Nitrate] Headache    Sulfa Antibiotics Hives    Ciprofloxacin Rash and GI Intolerance     Vitals:    02/15/19 1402   BP: 142/80   Pulse: 82   Resp: 14   Temp: 97 8 °F (36 6 °C)       Physical Exam   Constitutional: General appearance: The Patient is well-developed and well-nourished who appears the stated age in no acute distress  Patient is pleasant and talkative  HEENT:  Normocephalic  Sclerae are anicteric  Mucous membranes are moist  Neck is supple without adenopathy  No JVD  Chest: The lungs are clear to auscultation  Cardiac: Heart is regular rate  Abdomen: Abdomen is soft, non-tender, non-distended and without masses  Extremities: There is no clubbing or cyanosis  There is no edema  Symmetric  Neuro: Grossly nonfocal  Gait is normal      Lymphatic: No evidence of cervical adenopathy bilaterally  No evidence of axillary adenopathy bilaterally  No evidence of inguinal adenopathy bilaterally  Skin: Warm, anicteric  Psych:  Patient is pleasant and talkative  Breasts:      Pathology:  [unfilled]    Labs:      Imaging  Ct Renal Protocol    Result Date: 1/23/2019  Narrative: CT ABDOMEN AND PELVIS WITH AND WITHOUT IV CONTRAST INDICATION:   R31 0: Gross hematuria  COMPARISON:  Renal ultrasound from 10/10/2017   TECHNIQUE: Initial CT of the abdomen and pelvis was performed without intravenous contrast  Subsequent dynamic CT evaluation of the abdomen and pelvis was performed after the administration of intravenous contrast in both nephrographic and delayed phases after the administration of intravenous contrast   Axial, sagittal, and coronal 2D reformatted images were created from the source data and submitted for interpretation  Radiation dose length product (DLP) for this visit:  736 mGy-cm   This examination, like all CT scans performed in the [facility], was performed utilizing techniques to minimize radiation dose exposure, including the use of iterative reconstruction and automated exposure control  IV Contrast:  100 mL of iohexol (OMNIPAQUE) Enteric Contrast:  Enteric contrast was not administered  FINDINGS: ABDOMEN RIGHT KIDNEY AND URETER: There is a too small to characterize but likely benign well-defined hypodense lesion in the right kidney midpole (series 3 image 58 ) No solid renal mass  No detectable urothelial mass  However, please see below regarding a retroperitoneal nodular mass displacing the right ureter  No hydronephrosis or hydroureter  No urinary tract calculi  No perinephric collection  LEFT KIDNEY AND URETER: Again seen is a 2 0 x 1 7 cm left kidney lower pole exophytic lesion, which measures 40 Hounsfield units on the noncontrast CT, and does not demonstrates significant enhancement postcontrast   This is unchanged in size compared to the 10/10/2017 ultrasound, where it appeared simple cystic  This is consistent with a hemorrhagic or proteinaceous cyst (series 2 image 45 ) No solid renal mass  No detectable urothelial mass  No hydronephrosis or hydroureter  No urinary tract calculi  No perinephric collection  URINARY BLADDER: No bladder wall mass  No calculi  LOWER CHEST:  No clinically significant abnormality identified in the visualized lower chest  LIVER/BILIARY TREE:  Unremarkable  GALLBLADDER:  No calcified gallstones  No pericholecystic inflammatory change  SPLEEN:  Unremarkable  PANCREAS:  Unremarkable  ADRENAL GLANDS:  Unremarkable  STOMACH AND BOWEL:  There is colonic diverticulosis without evidence of acute diverticulitis  ABDOMINOPELVIC CAVITY:  There is a 1 5 x 1 4 x 2 6 cm retroperitoneal nodular lesion, located right of the IVC, posterior to the duodenum, and anterior to the psoas muscle (series 5 images 66 through 76 )  Of note, the right ureter is displaced toward the left of the lesion  VESSELS:  Unremarkable for patient's age  PELVIS REPRODUCTIVE ORGANS:  Unremarkable for patient's age  APPENDIX: No findings to suggest appendicitis  ABDOMINAL WALL/INGUINAL REGIONS:  Unremarkable  OSSEOUS STRUCTURES:  No acute fracture or destructive osseous lesion  Impression: There are no renal parenchymal or urothelial lesions that are suspicious for pneumonia is present, however there is a 1 5 x 1 4 x 2 6 cm retroperitoneal nodular lesion, located right of the IVC, posterior to the duodenum, and anterior to the psoas muscle  (series 5 images 66 through 76 )  Of note, the right ureter is displaced toward the left of the lesion  Either PET/CT or CT-guided biopsy is recommended for further evaluation of this finding  The study was marked in EPIC for significant notification  Workstation performed: [id number]     I reviewed the above laboratory and imaging data  Discussion/Summary:  82-year-old female with a retroperitoneal mass  It is unclear the etiology of this  Urology does not believe that this is the cause of her hematuria  Since this is adjacent to the duodenum, endoscopic ultrasound may be the most reasonable way to perform a biopsy to get a definitive diagnosis  Her  sees [facility]  I will get her set up with them to perform EUS  I will see her back once we have the results  Further recommendations we based on those results  She is agreeable to this plan  All of their questions were answered

## 2019-03-12 ENCOUNTER — TELEPHONE (OUTPATIENT)
Dept: GASTROENTEROLOGY | Facility: CLINIC | Age: 74
End: 2019-03-12

## 2019-03-12 ENCOUNTER — PREP FOR PROCEDURE (OUTPATIENT)
Dept: GASTROENTEROLOGY | Facility: CLINIC | Age: 74
End: 2019-03-12

## 2019-03-12 DIAGNOSIS — R19.00 RETROPERITONEAL MASS: Primary | ICD-10-CM

## 2019-03-12 NOTE — TELEPHONE ENCOUNTER
----- Message from Shania Estrada sent at 3/12/2019  8:32 AM EDT -----      ----- Message -----  From: Lucas Carroll MA  Sent: 3/12/2019   8:19 AM  To: ALEJANDRINA Logan,    Pt aware EUS scheduled with Dr Malagon on 3/14/2019   ----- Message -----  From: Live Piedra RN  Sent: 3/11/2019  12:51 PM  To: Lucas Carroll MA, Ruth Torres MD    [facility],  I have somewhat of a favor to ask  Dr Crystal Held saw this pt a month ago  Long story short, a different dept was handling checkout for us that day, and I guess they called GI to set up appt for EUS  Looks like they put her in with Ray for this Friday  That appt should have been made with one of the EUS docs instead, and preferably for the procedure instead of a consult (if possible)  Is there any way we can get this pt on for EUS this week? I believe pt's  has seen Bry Stevens in the past, but any of the EUS would be fine at this point  I know the mistake was on our end, but she's already waited 4 weeks  If there's any way you can help, that would be great  Thanks!

## 2019-03-12 NOTE — TELEPHONE ENCOUNTER
EUS scheduled with Dr Malagon in [place] on 3/14/2019  I gave pt verbal instructions/emailed to [email or web address]

## 2019-03-14 ENCOUNTER — ANESTHESIA EVENT (OUTPATIENT)
Dept: GASTROENTEROLOGY | Facility: HOSPITAL | Age: 74
End: 2019-03-14
Payer: COMMERCIAL

## 2019-03-14 ENCOUNTER — ANESTHESIA (OUTPATIENT)
Dept: GASTROENTEROLOGY | Facility: HOSPITAL | Age: 74
End: 2019-03-14
Payer: COMMERCIAL

## 2019-03-14 ENCOUNTER — HOSPITAL ENCOUNTER (OUTPATIENT)
Facility: HOSPITAL | Age: 74
Setting detail: OUTPATIENT SURGERY
Discharge: HOME | End: 2019-03-14
Attending: INTERNAL MEDICINE | Admitting: INTERNAL MEDICINE
Payer: COMMERCIAL

## 2019-03-14 VITALS
TEMPERATURE: 97.4 F | RESPIRATION RATE: 16 BRPM | HEIGHT: 63 IN | HEART RATE: 53 BPM | OXYGEN SATURATION: 99 % | BODY MASS INDEX: 22.86 KG/M2 | WEIGHT: 129 LBS | DIASTOLIC BLOOD PRESSURE: 62 MMHG | SYSTOLIC BLOOD PRESSURE: 122 MMHG

## 2019-03-14 DIAGNOSIS — K29.00 ACUTE SUPERFICIAL GASTRITIS WITHOUT HEMORRHAGE: Primary | ICD-10-CM

## 2019-03-14 DIAGNOSIS — R19.00 RETROPERITONEAL MASS: ICD-10-CM

## 2019-03-14 PROCEDURE — 87205 SMEAR GRAM STAIN: CPT | Performed by: INTERNAL MEDICINE

## 2019-03-14 PROCEDURE — 88305 TISSUE EXAM BY PATHOLOGIST: CPT | Performed by: PATHOLOGY

## 2019-03-14 PROCEDURE — 88341 IMHCHEM/IMCYTCHM EA ADD ANTB: CPT | Performed by: PATHOLOGY

## 2019-03-14 PROCEDURE — 88342 IMHCHEM/IMCYTCHM 1ST ANTB: CPT | Performed by: PATHOLOGY

## 2019-03-14 PROCEDURE — 88173 CYTOPATH EVAL FNA REPORT: CPT | Performed by: PATHOLOGY

## 2019-03-14 PROCEDURE — 43238 EGD US FINE NEEDLE BX/ASPIR: CPT | Performed by: INTERNAL MEDICINE

## 2019-03-14 PROCEDURE — 87075 CULTR BACTERIA EXCEPT BLOOD: CPT | Performed by: INTERNAL MEDICINE

## 2019-03-14 PROCEDURE — 87176 TISSUE HOMOGENIZATION CULTR: CPT | Performed by: INTERNAL MEDICINE

## 2019-03-14 PROCEDURE — 43239 EGD BIOPSY SINGLE/MULTIPLE: CPT | Performed by: INTERNAL MEDICINE

## 2019-03-14 PROCEDURE — 88172 CYTP DX EVAL FNA 1ST EA SITE: CPT | Performed by: PATHOLOGY

## 2019-03-14 PROCEDURE — 87070 CULTURE OTHR SPECIMN AEROBIC: CPT | Performed by: INTERNAL MEDICINE

## 2019-03-14 RX ORDER — SODIUM CHLORIDE 9 MG/ML
125 INJECTION, SOLUTION INTRAVENOUS CONTINUOUS
Status: DISCONTINUED | OUTPATIENT
Start: 2019-03-14 | End: 2019-03-14 | Stop reason: HOSPADM

## 2019-03-14 RX ORDER — PROPOFOL 10 MG/ML
INJECTION, EMULSION INTRAVENOUS AS NEEDED
Status: DISCONTINUED | OUTPATIENT
Start: 2019-03-14 | End: 2019-03-14 | Stop reason: SURG

## 2019-03-14 RX ORDER — SODIUM CHLORIDE, SODIUM LACTATE, POTASSIUM CHLORIDE, CALCIUM CHLORIDE 600; 310; 30; 20 MG/100ML; MG/100ML; MG/100ML; MG/100ML
125 INJECTION, SOLUTION INTRAVENOUS CONTINUOUS
Status: CANCELLED | OUTPATIENT
Start: 2019-03-14

## 2019-03-14 RX ORDER — OMEPRAZOLE 40 MG/1
40 CAPSULE, DELAYED RELEASE ORAL DAILY
Qty: 30 CAPSULE | Refills: 0 | Status: SHIPPED | OUTPATIENT
Start: 2019-03-14 | End: 2019-04-17 | Stop reason: ALTCHOICE

## 2019-03-14 RX ORDER — PROPOFOL 10 MG/ML
INJECTION, EMULSION INTRAVENOUS CONTINUOUS PRN
Status: DISCONTINUED | OUTPATIENT
Start: 2019-03-14 | End: 2019-03-14 | Stop reason: SURG

## 2019-03-14 RX ORDER — LIDOCAINE HYDROCHLORIDE 10 MG/ML
INJECTION, SOLUTION INFILTRATION; PERINEURAL AS NEEDED
Status: DISCONTINUED | OUTPATIENT
Start: 2019-03-14 | End: 2019-03-14 | Stop reason: SURG

## 2019-03-14 RX ADMIN — SODIUM CHLORIDE: 0.9 INJECTION, SOLUTION INTRAVENOUS at 14:39

## 2019-03-14 RX ADMIN — LIDOCAINE HYDROCHLORIDE 50 MG: 10 INJECTION, SOLUTION INFILTRATION; PERINEURAL at 14:47

## 2019-03-14 RX ADMIN — SODIUM CHLORIDE 125 ML/HR: 0.9 INJECTION, SOLUTION INTRAVENOUS at 14:31

## 2019-03-14 RX ADMIN — PROPOFOL 100 MG: 10 INJECTION, EMULSION INTRAVENOUS at 14:47

## 2019-03-14 RX ADMIN — SODIUM CHLORIDE: 0.9 INJECTION, SOLUTION INTRAVENOUS at 15:47

## 2019-03-14 RX ADMIN — PROPOFOL 150 MCG/KG/MIN: 10 INJECTION, EMULSION INTRAVENOUS at 14:48

## 2019-03-14 NOTE — ANESTHESIA PREPROCEDURE EVALUATION
Review of Systems/Medical History          Cardiovascular  Hyperlipidemia, Hypertension ,    Pulmonary       GI/Hepatic            Endo/Other     GYN       Hematology   Musculoskeletal       Neurology   Psychology           Physical Exam    Airway    Mallampati score: I         Dental       Cardiovascular  Rhythm: regular, Rate: normal,     Pulmonary      Other Findings        Anesthesia Plan  ASA Score- 2     Anesthesia Type- IV sedation with anesthesia with ASA Monitors  Additional Monitors:   Airway Plan:         Plan Factors-    Induction- intravenous  Postoperative Plan- Plan for postoperative opioid use  Informed Consent- Anesthetic plan and risks discussed with patient  I personally reviewed this patient with the CRNA  Discussed and agreed on the Anesthesia Plan with the CRNA  Mary aHssan

## 2019-03-14 NOTE — ANESTHESIA POSTPROCEDURE EVALUATION
Post-Op Assessment Note    CV Status:  Stable  Pain Score: 0    Pain management: adequate     Mental Status:  Awake and somnolent   Hydration Status:  Euvolemic   PONV Controlled:  Controlled   Airway Patency:  Patent   Post Op Vitals Reviewed: Yes      Staff: CRNA           /68 (03/14/19 1604)    Temp     Pulse (!) 54 (03/14/19 1604)   Resp 16 (03/14/19 1604)    SpO2 100 % (03/14/19 1604)

## 2019-03-17 LAB
BACTERIA SPEC ANAEROBE CULT: NO GROWTH
BACTERIA TISS AEROBE CULT: NO GROWTH
GRAM STN SPEC: NORMAL

## 2019-03-20 DIAGNOSIS — R19.00 RETROPERITONEAL MASS: Primary | ICD-10-CM

## 2019-04-04 ENCOUNTER — OFFICE VISIT (OUTPATIENT)
Dept: CARDIOLOGY CLINIC | Facility: MEDICAL CENTER | Age: 74
End: 2019-04-04
Payer: COMMERCIAL

## 2019-04-04 VITALS
HEART RATE: 66 BPM | BODY MASS INDEX: 23 KG/M2 | DIASTOLIC BLOOD PRESSURE: 74 MMHG | WEIGHT: 129.8 LBS | SYSTOLIC BLOOD PRESSURE: 128 MMHG | OXYGEN SATURATION: 98 % | HEIGHT: 63 IN

## 2019-04-04 DIAGNOSIS — I73.9 PAD (PERIPHERAL ARTERY DISEASE) (HCC): Primary | ICD-10-CM

## 2019-04-04 DIAGNOSIS — E78.2 MIXED HYPERLIPIDEMIA: ICD-10-CM

## 2019-04-04 DIAGNOSIS — R07.89 OTHER CHEST PAIN: ICD-10-CM

## 2019-04-04 DIAGNOSIS — I10 ESSENTIAL HYPERTENSION: ICD-10-CM

## 2019-04-04 PROCEDURE — 99214 OFFICE O/P EST MOD 30 MIN: CPT | Performed by: INTERNAL MEDICINE

## 2019-04-04 RX ORDER — METOPROLOL SUCCINATE 25 MG/1
25 TABLET, EXTENDED RELEASE ORAL
Start: 2019-04-04 | End: 2020-07-29 | Stop reason: SDUPTHER

## 2019-04-17 ENCOUNTER — CONSULT (OUTPATIENT)
Dept: VASCULAR SURGERY | Facility: CLINIC | Age: 74
End: 2019-04-17
Payer: COMMERCIAL

## 2019-04-17 ENCOUNTER — OFFICE VISIT (OUTPATIENT)
Dept: SURGICAL ONCOLOGY | Facility: CLINIC | Age: 74
End: 2019-04-17
Payer: COMMERCIAL

## 2019-04-17 VITALS
TEMPERATURE: 97.6 F | BODY MASS INDEX: 22.68 KG/M2 | HEIGHT: 63 IN | SYSTOLIC BLOOD PRESSURE: 140 MMHG | DIASTOLIC BLOOD PRESSURE: 70 MMHG | WEIGHT: 128 LBS | RESPIRATION RATE: 16 BRPM | HEART RATE: 72 BPM

## 2019-04-17 VITALS
BODY MASS INDEX: 22.86 KG/M2 | HEART RATE: 78 BPM | TEMPERATURE: 98.6 F | HEIGHT: 63 IN | SYSTOLIC BLOOD PRESSURE: 140 MMHG | DIASTOLIC BLOOD PRESSURE: 70 MMHG | WEIGHT: 129 LBS

## 2019-04-17 DIAGNOSIS — R19.00 RETROPERITONEAL MASS: Primary | ICD-10-CM

## 2019-04-17 DIAGNOSIS — I73.9 PAD (PERIPHERAL ARTERY DISEASE) (HCC): ICD-10-CM

## 2019-04-17 PROCEDURE — 99213 OFFICE O/P EST LOW 20 MIN: CPT | Performed by: SURGERY

## 2019-04-17 PROCEDURE — 99203 OFFICE O/P NEW LOW 30 MIN: CPT | Performed by: PHYSICIAN ASSISTANT

## 2019-07-01 ENCOUNTER — TELEPHONE (OUTPATIENT)
Dept: GASTROENTEROLOGY | Facility: CLINIC | Age: 74
End: 2019-07-01

## 2019-07-01 DIAGNOSIS — R19.00 RETROPERITONEAL MASS: Primary | ICD-10-CM

## 2019-07-01 NOTE — TELEPHONE ENCOUNTER
----- Message from Lynda Lagunas MD sent at 7/1/2019  9:16 AM EDT -----  Hello  Please schedule patient for basic metabolic panel  I have placed hte order  Thanks  Madeline Pressley    ----- Message -----  From: Zechariah Warren  Sent: 6/26/2019   1:10 PM EDT  To: MD Lesly Casas, I am a CT Technologist at [facility] has an appt with us on July 8 for a CT Abdomen/Pelvis orderded by you  She will require bloodwork for this scan  It is Paty Breaker protocol for patients over the age of 79 to have a BUN, Creatnine and GFR  Please put the orders in for this patient and please have one of your stall notify her so she can have it done  She is going on vacation on Saturday June 29, and is concerned about the timing     Thank Adriano Horowitz RT(R)(M)(CT)

## 2019-07-05 ENCOUNTER — TRANSCRIBE ORDERS (OUTPATIENT)
Dept: ADMINISTRATIVE | Facility: HOSPITAL | Age: 74
End: 2019-07-05

## 2019-07-06 ENCOUNTER — APPOINTMENT (OUTPATIENT)
Dept: LAB | Facility: MEDICAL CENTER | Age: 74
End: 2019-07-06
Payer: COMMERCIAL

## 2019-07-06 DIAGNOSIS — I10 ESSENTIAL HYPERTENSION: ICD-10-CM

## 2019-07-06 DIAGNOSIS — R19.00 RETROPERITONEAL MASS: ICD-10-CM

## 2019-07-06 LAB
ANION GAP SERPL CALCULATED.3IONS-SCNC: 5 MMOL/L (ref 4–13)
BUN SERPL-MCNC: 22 MG/DL (ref 5–25)
CALCIUM SERPL-MCNC: 8.9 MG/DL (ref 8.3–10.1)
CHLORIDE SERPL-SCNC: 105 MMOL/L (ref 100–108)
CHOLEST SERPL-MCNC: 165 MG/DL (ref 50–200)
CO2 SERPL-SCNC: 29 MMOL/L (ref 21–32)
CREAT SERPL-MCNC: 0.97 MG/DL (ref 0.6–1.3)
ERYTHROCYTE [DISTWIDTH] IN BLOOD BY AUTOMATED COUNT: 13.2 % (ref 11.6–15.1)
GFR SERPL CREATININE-BSD FRML MDRD: 59 ML/MIN/1.73SQ M
GLUCOSE P FAST SERPL-MCNC: 76 MG/DL (ref 65–99)
HCT VFR BLD AUTO: 38.2 % (ref 34.8–46.1)
HDLC SERPL-MCNC: 46 MG/DL (ref 40–60)
HGB BLD-MCNC: 11.9 G/DL (ref 11.5–15.4)
LDLC SERPL CALC-MCNC: 95 MG/DL (ref 0–100)
MCH RBC QN AUTO: 31.1 PG (ref 26.8–34.3)
MCHC RBC AUTO-ENTMCNC: 31.2 G/DL (ref 31.4–37.4)
MCV RBC AUTO: 100 FL (ref 82–98)
PLATELET # BLD AUTO: 249 THOUSANDS/UL (ref 149–390)
PMV BLD AUTO: 9.7 FL (ref 8.9–12.7)
POTASSIUM SERPL-SCNC: 3.9 MMOL/L (ref 3.5–5.3)
RBC # BLD AUTO: 3.83 MILLION/UL (ref 3.81–5.12)
SODIUM SERPL-SCNC: 139 MMOL/L (ref 136–145)
TRIGL SERPL-MCNC: 119 MG/DL
WBC # BLD AUTO: 6.71 THOUSAND/UL (ref 4.31–10.16)

## 2019-07-06 PROCEDURE — 36415 COLL VENOUS BLD VENIPUNCTURE: CPT

## 2019-07-06 PROCEDURE — 80061 LIPID PANEL: CPT

## 2019-07-06 PROCEDURE — 85027 COMPLETE CBC AUTOMATED: CPT | Performed by: INTERNAL MEDICINE

## 2019-07-06 PROCEDURE — 80048 BASIC METABOLIC PNL TOTAL CA: CPT

## 2019-07-08 ENCOUNTER — HOSPITAL ENCOUNTER (OUTPATIENT)
Dept: RADIOLOGY | Facility: MEDICAL CENTER | Age: 74
Discharge: HOME | End: 2019-07-08
Payer: COMMERCIAL

## 2019-07-08 DIAGNOSIS — R19.00 RETROPERITONEAL MASS: ICD-10-CM

## 2019-07-08 PROCEDURE — 74177 CT ABD & PELVIS W/CONTRAST: CPT

## 2019-07-08 RX ADMIN — IOHEXOL 100 ML: 350 INJECTION, SOLUTION INTRAVENOUS at 09:08

## 2019-07-17 ENCOUNTER — OFFICE VISIT (OUTPATIENT)
Dept: SURGICAL ONCOLOGY | Facility: CLINIC | Age: 74
End: 2019-07-17
Payer: COMMERCIAL

## 2019-07-17 VITALS
SYSTOLIC BLOOD PRESSURE: 118 MMHG | WEIGHT: 129 LBS | HEIGHT: 63 IN | DIASTOLIC BLOOD PRESSURE: 82 MMHG | RESPIRATION RATE: 16 BRPM | BODY MASS INDEX: 22.86 KG/M2 | HEART RATE: 110 BPM | TEMPERATURE: 97.5 F

## 2019-07-17 DIAGNOSIS — R19.00 RETROPERITONEAL MASS: Primary | ICD-10-CM

## 2019-07-17 PROCEDURE — 99213 OFFICE O/P EST LOW 20 MIN: CPT | Performed by: SURGERY

## 2019-07-17 RX ORDER — EZETIMIBE 10 MG/1
TABLET ORAL
COMMUNITY
Start: 2019-05-15 | End: 2020-05-14 | Stop reason: SDUPTHER

## 2019-07-17 NOTE — PROGRESS NOTES
Surgical Oncology Follow Up       [address] [facility]  [facility]  [address] [facility] [address]  [place] [address] [facility] [place]  1945  [phone]  [address] [facility]  [facility] [place]  [address] [facility] [place] [address]    Diagnoses and all orders for this visit:    Retroperitoneal mass    Other orders  -     ezetimibe (ZETIA) 10 mg tablet        Chief Complaint   Patient presents with    Follow-up     3 MO Retroperitoneal mass f/u       No follow-ups on file  No history exists  Staging:   Treatment history:  Endoscopic ultrasound, March 2014  Biopsy was benign, no lymphoid tissue was seen  Current treatment:  Observation  Disease status:      History of Present Illness:  Patient returns in follow-up  She is doing well at this time with no complaints CT from July 8, 2019 reveals that the previous seen right retroperitoneal lesion was no longer visualized  I personally reviewed the films  Patient denies any abdominal pain, nausea, vomiting, early satiety, fevers, chills, night sweats, change in appetite or weight loss  Review of Systems  Complete ROS Surg Onc:   Complete ROS Surg Onc:   Constitutional: The patient denies new or recent history of general fatigue, no recent weight loss, no change in appetite  Eyes: No complaints of visual problems, no scleral icterus  ENT: no complaints of ear pain, no hoarseness, no difficulty swallowing,  no tinnitus and no new masses in head, oral cavity, or neck  Cardiovascular: No complaints of chest pain, no palpitations, no ankle edema  Respiratory: No complaints of shortness of breath, no cough  Gastrointestinal: No complaints of jaundice, no bloody stools, no pale stools  Genitourinary: No complaints of dysuria, no hematuria, no nocturia, no frequent urination, no urethral discharge     Musculoskeletal: No complaints of weakness, paralysis, joint stiffness or arthralgias  Integumentary: No complaints of rash, no new lesions  Neurological: No complaints of convulsions, no seizures, no dizziness  Hematologic/Lymphatic: No complaints of easy bruising  Endocrine:  No hot or cold intolerance  No polydipsia, polyphagia, or polyuria  Allergy/immunology:  No environmental allergies  No food allergies  Not immunocompromised  Skin:  No pallor or rash  No wound  Patient Active Problem List   Diagnosis    Other chest pain    Essential hypertension    PAD (peripheral artery disease) (HCC)    Gross hematuria    Anxiety disorder    Hyperlipidemia    Hypothyroidism    Retroperitoneal mass     Past Medical History:   Diagnosis Date    Colon polyps     Gallbladder polyp     Kidney cysts      Past Surgical History:   Procedure Laterality Date    COLONOSCOPY W/ POLYPECTOMY      ME EDG US EXAM SURGICAL ALTER STOM DUODENUM/JEJUNUM N/A 3/14/2019    Procedure: LINEAR ENDOSCOPIC U/S;  Surgeon: Kassandra Joe MD;  [facility]: [facility];   Service: Gastroenterology     Family History   Problem Relation Age of Onset    Hyperlipidemia Mother     Heart attack Father     Other Sister         open heart surgery     Heart murmur Sister     Stroke Sister     Hyperlipidemia Sister         all siblings    [place] Breast cancer Sister 79    Heart attack Brother         open heart surgery     Deep vein thrombosis Brother     Hyperlipidemia Brother     Coronary artery disease Brother         stents placed     Heart attack Paternal Aunt     Heart attack Paternal Uncle     Hypertension Family         all in the family both sides    Anuerysm Neg Hx     Heart failure Neg Hx      Social History     Socioeconomic History    Marital status: /Civil Union     Spouse name: Not on file    Number of children: Not on file    Years of education: Not on file    Highest education level: Not on file   Occupational History    Not on file   Social Needs    Financial resource strain: Not on file    Food insecurity:     Worry: Not on file     Inability: Not on file    Transportation needs:     Medical: Not on file     Non-medical: Not on file   Tobacco Use    Smoking status: Never Smoker    Smokeless tobacco: Never Used   Substance and Sexual Activity    Alcohol use: Yes     Comment: social - rare     Drug use: No    Sexual activity: Not on file   Lifestyle    Physical activity:     Days per week: Not on file     Minutes per session: Not on file    Stress: Not on file   Relationships    Social connections:     Talks on phone: Not on file     Gets together: Not on file     Attends [employer] service: Not on file     Active member of club or organization: Not on file     Attends meetings of clubs or organizations: Not on file     Relationship status: Not on file    Intimate partner violence:     Fear of current or ex partner: Not on file     Emotionally abused: Not on file     Physically abused: Not on file     Forced sexual activity: Not on file   Other Topics Concern    Not on file   Social History Narrative    Not on file       Current Outpatient Medications:     Aspirin (ASPIR-81 PO), Take 81 mg by mouth daily, Disp: , Rfl:     benazepril (LOTENSIN) 40 MG tablet, Take 2 tablets by mouth daily in the early morning, Disp: , Rfl:     Biotin 10 MG CAPS, Take 1 capsule by mouth daily 5000 mcg daily , Disp: , Rfl:     Cholecalciferol (VITAMIN D3) 1000 units CAPS, Take 1 capsule by mouth daily, Disp: , Rfl:     Coenzyme Q10 (COQ-10) 100 MG CAPS, Take 1 capsule by mouth daily, Disp: , Rfl:     cyanocobalamin (VITAMIN B-12) 1,000 mcg tablet, Take 1,000 mcg by mouth daily, Disp: , Rfl:     escitalopram (LEXAPRO) 10 mg tablet, Take 1 tablet by mouth daily at bedtime, Disp: , Rfl:     ezetimibe (ZETIA) 10 mg tablet, , Disp: , Rfl:     Lutein 6 MG TABS, Take 20 mg by mouth daily, Disp: , Rfl:     Magnesium Oxide -Mg Supplement 400 MG CAPS, Take 1 capsule by mouth daily, Disp: , Rfl:     metoprolol succinate (TOPROL-XL) 25 mg 24 hr tablet, Take 1 tablet (25 mg total) by mouth daily at bedtime, Disp: , Rfl:     niacin 500 mg tablet, Take 500 mg by mouth daily with breakfast, Disp: , Rfl:     Omega 3 1200 MG CAPS, Take 1 capsule by mouth daily, Disp: , Rfl:     Probiotic Product (PROBIOTIC-10) CAPS, Take by mouth daily, Disp: , Rfl:     Red Yeast Rice 600 MG CAPS, Take 2 capsules by mouth daily, Disp: , Rfl:     Turmeric 500 MG CAPS, Take 500 mg by mouth daily, Disp: , Rfl:   Allergies   Allergen Reactions    Imdur [Isosorbide Nitrate] Headache    Sulfa Antibiotics Hives    Ciprofloxacin Rash and GI Intolerance    Macrobid [Nitrofurantoin] Other (See Comments)     Pt does not recall reaction     Vitals:    07/17/19 1448   BP: 118/82   Pulse: (!) 110   Resp: 16   Temp: 97 5 °F (36 4 °C)       Physical Exam  Constitutional: General appearance: The Patient is well-developed and well-nourished who appears the stated age in no acute distress  Patient is pleasant and talkative  HEENT:  Normocephalic  Sclerae are anicteric  Mucous membranes are moist  Neck is supple without adenopathy  No JVD  Chest: The lungs are clear to auscultation  Cardiac: Heart is regular rate  Abdomen: Abdomen is soft, non-tender, non-distended and without masses  Extremities: There is no clubbing or cyanosis  There is no edema  Symmetric  Neuro: Grossly nonfocal  Gait is normal      Lymphatic: No evidence of cervical adenopathy bilaterally  No evidence of axillary adenopathy bilaterally  No evidence of inguinal adenopathy bilaterally  Skin: Warm, anicteric  Psych:  Patient is pleasant and talkative  Breasts:        Pathology:  [unfilled]    Labs:      Imaging  Ct Abdomen Pelvis W Contrast    Result Date: 7/10/2019  Narrative: CT ABDOMEN AND PELVIS WITH IV CONTRAST INDICATION:   R19 00: Intra-abdominal and pelvic swelling, mass and lump, unspecified site    Excerpt from Surgical Oncology progress note dated 4/17/2019: "History of Present Illness:  Patient returns after her endoscopic ultrasound  This revealed a possible lymph node in the ronna duodenal region adjacent to the IVC  This measured 2 4 cm  Fine-needle aspiration was performed and this was negative for malignancy  Preliminary pathology did reveal granulomas and a culture was sent  COMPARISON:  None  TECHNIQUE:  CT examination of the abdomen and pelvis was performed  Axial, sagittal, and coronal 2D reformatted images were created from the source data and submitted for interpretation  Radiation dose length product (DLP) for this visit:  304 mGy-cm   This examination, like all CT scans performed in the [facility], was performed utilizing techniques to minimize radiation dose exposure, including the use of iterative reconstruction and automated exposure control  IV Contrast:  100 mL of iohexol (OMNIPAQUE) Enteric Contrast:  Enteric contrast was not administered  FINDINGS: ABDOMEN LOWER CHEST:  No clinically significant abnormality identified in the visualized lower chest  LIVER/BILIARY TREE:  One or more subcentimeter sharply circumscribed low-density hepatic lesion(s) are noted, too small to accurately characterize, but statistically most likely to represent subcentimeter hepatic cysts  No suspicious solid hepatic lesion is identified  Hepatic contours are normal   No biliary dilatation  GALLBLADDER:  No calcified gallstones  No pericholecystic inflammatory change  SPLEEN:  Unremarkable  PANCREAS:  Unremarkable  ADRENAL GLANDS:  Unremarkable  KIDNEYS/URETERS:  No hydronephrosis  Stable appearing cyst  STOMACH AND BOWEL:  Unremarkable  APPENDIX:  No findings to suggest appendicitis  ABDOMINOPELVIC CAVITY:  No ascites or free air  No lymphadenopathy  The previously seen right retroperitoneal lesion is no longer apparent status post biopsy  VESSELS:  Unremarkable for patient's age   PELVIS REPRODUCTIVE ORGANS:  Unremarkable for patient's age  URINARY BLADDER:  Unremarkable  ABDOMINAL WALL/INGUINAL REGIONS:  Unremarkable  OSSEOUS STRUCTURES:  No acute fracture or destructive osseous lesion  Impression: Previously seen right retroperitoneal lesion is no longer apparent status post biopsy  No new findings in the abdomen or the pelvis  Workstation performed: [id number]     I reviewed the above laboratory and imaging data  Discussion/Summary:  72-year-old female with a retroperitoneal mass that has resolved on her most recent imaging  This was most likely benign  Since this has resolved she will follow up with her primary care physician  If she has any worsening abdominal symptoms she will contact us or her gastroenterologist   I will see her again in the future should arise  She is agreeable to this  All her questions were answered

## 2019-07-17 NOTE — LETTER
July 17, 2019     Florence Michel MD  [address] [facility] [address] [facility] [address]    Patient: Edward Martell   YOB: 1945   Date of Visit: 7/17/2019       Dear Dr Alan Martinez: Thank you for referring Josse Gallo to me for evaluation  Below are my notes for this consultation  If you have questions, please do not hesitate to call me  I look forward to following your patient along with you  Sincerely,        Jaleel Coyle MD        CC: MD Jaleel Bowers MD  7/17/2019  3:14 PM  Incomplete               Surgical Oncology Follow Up       [address]  [facility] [place]  [address] [facility] [address] [facility]  1945  [id number]  [address] [facility] [place]  [address] [place] [address]    Diagnoses and all orders for this visit:    Retroperitoneal mass    Other orders  -     ezetimibe (ZETIA) 10 mg tablet        Chief Complaint   Patient presents with    Follow-up     3 MO Retroperitoneal mass f/u       No follow-ups on file  No history exists  Staging:   Treatment history:  Endoscopic ultrasound, March 2014  Biopsy was benign, no lymphoid tissue was seen  Current treatment:  Observation  Disease status:      History of Present Illness:  Patient returns in follow-up  She is doing well at this time with no complaints CT from July 8, 2019 reveals that the previous seen right retroperitoneal lesion was no longer visualized  I personally reviewed the films  Patient denies any abdominal pain, nausea, vomiting, early satiety, fevers, chills, night sweats, change in appetite or weight loss  Review of Systems  Complete ROS Surg Onc:   Complete ROS Surg Onc:   Constitutional: The patient denies new or recent history of general fatigue, no recent weight loss, no change in appetite  Eyes: No complaints of visual problems, no scleral icterus     ENT: no complaints of ear pain, no hoarseness, no difficulty swallowing,  no tinnitus and no new masses in head, oral cavity, or neck  Cardiovascular: No complaints of chest pain, no palpitations, no ankle edema  Respiratory: No complaints of shortness of breath, no cough  Gastrointestinal: No complaints of jaundice, no bloody stools, no pale stools  Genitourinary: No complaints of dysuria, no hematuria, no nocturia, no frequent urination, no urethral discharge  Musculoskeletal: No complaints of weakness, paralysis, joint stiffness or arthralgias  Integumentary: No complaints of rash, no new lesions  Neurological: No complaints of convulsions, no seizures, no dizziness  Hematologic/Lymphatic: No complaints of easy bruising  Endocrine:  No hot or cold intolerance  No polydipsia, polyphagia, or polyuria  Allergy/immunology:  No environmental allergies  No food allergies  Not immunocompromised  Skin:  No pallor or rash  No wound  Patient Active Problem List   Diagnosis    Other chest pain    Essential hypertension    PAD (peripheral artery disease) (HCC)    Gross hematuria    Anxiety disorder    Hyperlipidemia    Hypothyroidism    Retroperitoneal mass     Past Medical History:   Diagnosis Date    Colon polyps     Gallbladder polyp     Kidney cysts      Past Surgical History:   Procedure Laterality Date    COLONOSCOPY W/ POLYPECTOMY      NC EDG US EXAM SURGICAL ALTER STOM DUODENUM/JEJUNUM N/A 3/14/2019    Procedure: LINEAR ENDOSCOPIC U/S;  Surgeon: Marianne Torres MD;  Location[facility];   Service: Gastroenterology     Family History   Problem Relation Age of Onset    Hyperlipidemia Mother     Heart attack Father     Other Sister         open heart surgery     Heart murmur Sister     Stroke Sister     Hyperlipidemia Sister         all siblings     Breast cancer Sister 79    Heart attack Brother         open heart surgery     Deep vein thrombosis Brother     Hyperlipidemia Brother     Coronary artery disease Brother         stents placed     Heart attack Paternal Aunt     Heart attack Paternal Uncle     Hypertension Family         all in the family both sides    Anuerysm Neg Hx     Heart failure Neg Hx      Social History     Socioeconomic History    Marital status: /Civil Union     Spouse name: Not on file    Number of children: Not on file    Years of education: Not on file    Highest education level: Not on file   Occupational History    Not on file   Social Needs    Financial resource strain: Not on file    Food insecurity:     Worry: Not on file     Inability: Not on file    Transportation needs:     Medical: Not on file     Non-medical: Not on file   Tobacco Use    Smoking status: Never Smoker    Smokeless tobacco: Never Used   Substance and Sexual Activity    Alcohol use: Yes     Comment: social - rare     Drug use: No    Sexual activity: Not on file   Lifestyle    Physical activity:     Days per week: Not on file     Minutes per session: Not on file    Stress: Not on file   Relationships    Social connections:     Talks on phone: Not on file     Gets together: Not on file     Attends [employer] service: Not on file     Active member of club or organization: Not on file     Attends meetings of clubs or organizations: Not on file     Relationship status: Not on file    Intimate partner violence:     Fear of current or ex partner: Not on file     Emotionally abused: Not on file     Physically abused: Not on file     Forced sexual activity: Not on file   Other Topics Concern    Not on file   Social History Narrative    Not on file       Current Outpatient Medications:     Aspirin (ASPIR-81 PO), Take 81 mg by mouth daily, Disp: , Rfl:     benazepril (LOTENSIN) 40 MG tablet, Take 2 tablets by mouth daily in the early morning, Disp: , Rfl:     Biotin 10 MG CAPS, Take 1 capsule by mouth daily 5000 mcg daily , Disp: , Rfl:     Cholecalciferol (VITAMIN D3) 1000 units CAPS, Take 1 capsule by mouth daily, Disp: , Rfl:     Coenzyme Q10 (COQ-10) 100 MG CAPS, Take 1 capsule by mouth daily, Disp: , Rfl:     cyanocobalamin (VITAMIN B-12) 1,000 mcg tablet, Take 1,000 mcg by mouth daily, Disp: , Rfl:     escitalopram (LEXAPRO) 10 mg tablet, Take 1 tablet by mouth daily at bedtime, Disp: , Rfl:     ezetimibe (ZETIA) 10 mg tablet, , Disp: , Rfl:     Lutein 6 MG TABS, Take 20 mg by mouth daily, Disp: , Rfl:     Magnesium Oxide -Mg Supplement 400 MG CAPS, Take 1 capsule by mouth daily, Disp: , Rfl:     metoprolol succinate (TOPROL-XL) 25 mg 24 hr tablet, Take 1 tablet (25 mg total) by mouth daily at bedtime, Disp: , Rfl:     niacin 500 mg tablet, Take 500 mg by mouth daily with breakfast, Disp: , Rfl:     Omega 3 1200 MG CAPS, Take 1 capsule by mouth daily, Disp: , Rfl:     Probiotic Product (PROBIOTIC-10) CAPS, Take by mouth daily, Disp: , Rfl:     Red Yeast Rice 600 MG CAPS, Take 2 capsules by mouth daily, Disp: , Rfl:     Turmeric 500 MG CAPS, Take 500 mg by mouth daily, Disp: , Rfl:   Allergies   Allergen Reactions    Imdur [Isosorbide Nitrate] Headache    Sulfa Antibiotics Hives    Ciprofloxacin Rash and GI Intolerance    Macrobid [Nitrofurantoin] Other (See Comments)     Pt does not recall reaction     Vitals:    07/17/19 1448   BP: 118/82   Pulse: (!) 110   Resp: 16   Temp: 97 5 °F (36 4 °C)       Physical Exam  Constitutional: General appearance: The Patient is well-developed and well-nourished who appears the stated age in no acute distress  Patient is pleasant and talkative  HEENT:  Normocephalic  Sclerae are anicteric  Mucous membranes are moist  Neck is supple without adenopathy  No JVD  Chest: The lungs are clear to auscultation  Cardiac: Heart is regular rate  Abdomen: Abdomen is soft, non-tender, non-distended and without masses  Extremities: There is no clubbing or cyanosis  There is no edema  Symmetric    Neuro: Grossly nonfocal  Gait is normal      Lymphatic: No evidence of cervical adenopathy bilaterally  No evidence of axillary adenopathy bilaterally  No evidence of inguinal adenopathy bilaterally  Skin: Warm, anicteric  Psych:  Patient is pleasant and talkative  Breasts:        Pathology:  [unfilled]    Labs:      Imaging  Ct Abdomen Pelvis W Contrast    Result Date: 7/10/2019  Narrative: CT ABDOMEN AND PELVIS WITH IV CONTRAST INDICATION:   R19 00: Intra-abdominal and pelvic swelling, mass and lump, unspecified site  Excerpt from Surgical Oncology progress note dated 4/17/2019: "History of Present Illness:  Patient returns after her endoscopic ultrasound  This revealed a possible lymph node in the ronna duodenal region adjacent to the IVC  This measured 2 4 cm  Fine-needle aspiration was performed and this was negative for malignancy  Preliminary pathology did reveal granulomas and a culture was sent  COMPARISON:  None  TECHNIQUE:  CT examination of the abdomen and pelvis was performed  Axial, sagittal, and coronal 2D reformatted images were created from the source data and submitted for interpretation  Radiation dose length product (DLP) for this visit:  304 mGy-cm   This examination, like all CT scans performed in the [facility], was performed utilizing techniques to minimize radiation dose exposure, including the use of iterative reconstruction and automated exposure control  IV Contrast:  100 mL of iohexol (OMNIPAQUE) Enteric Contrast:  Enteric contrast was not administered  FINDINGS: ABDOMEN LOWER CHEST:  No clinically significant abnormality identified in the visualized lower chest  LIVER/BILIARY TREE:  One or more subcentimeter sharply circumscribed low-density hepatic lesion(s) are noted, too small to accurately characterize, but statistically most likely to represent subcentimeter hepatic cysts  No suspicious solid hepatic lesion is identified    Hepatic contours are normal   No biliary dilatation  GALLBLADDER:  No calcified gallstones  No pericholecystic inflammatory change  SPLEEN:  Unremarkable  PANCREAS:  Unremarkable  ADRENAL GLANDS:  Unremarkable  KIDNEYS/URETERS:  No hydronephrosis  Stable appearing cyst  STOMACH AND BOWEL:  Unremarkable  APPENDIX:  No findings to suggest appendicitis  ABDOMINOPELVIC CAVITY:  No ascites or free air  No lymphadenopathy  The previously seen right retroperitoneal lesion is no longer apparent status post biopsy  VESSELS:  Unremarkable for patient's age  PELVIS REPRODUCTIVE ORGANS:  Unremarkable for patient's age  URINARY BLADDER:  Unremarkable  ABDOMINAL WALL/INGUINAL REGIONS:  Unremarkable  OSSEOUS STRUCTURES:  No acute fracture or destructive osseous lesion  Impression: Previously seen right retroperitoneal lesion is no longer apparent status post biopsy  No new findings in the abdomen or the pelvis  Workstation performed: [id number]     I reviewed the above laboratory and imaging data  Discussion/Summary:  81-year-old female with a retroperitoneal mass that has resolved on her most recent imaging  This was most likely benign  Since this has resolved she will follow up with her primary care physician  If she has any worsening abdominal symptoms she will contact us or her gastroenterologist   I will see her again in the future should arise  She is agreeable to this  All her questions were answered

## 2019-07-23 ENCOUNTER — OFFICE VISIT (OUTPATIENT)
Dept: GASTROENTEROLOGY | Facility: AMBULARY SURGERY CENTER | Age: 74
End: 2019-07-23
Payer: COMMERCIAL

## 2019-07-23 VITALS
SYSTOLIC BLOOD PRESSURE: 126 MMHG | BODY MASS INDEX: 23 KG/M2 | WEIGHT: 129.8 LBS | RESPIRATION RATE: 16 BRPM | TEMPERATURE: 98.1 F | HEART RATE: 70 BPM | DIASTOLIC BLOOD PRESSURE: 60 MMHG | HEIGHT: 63 IN

## 2019-07-23 DIAGNOSIS — Z86.010 HISTORY OF COLON POLYPS: ICD-10-CM

## 2019-07-23 DIAGNOSIS — R19.00 RETROPERITONEAL MASS: Primary | ICD-10-CM

## 2019-07-23 PROBLEM — Z86.0100 HISTORY OF COLON POLYPS: Status: ACTIVE | Noted: 2019-07-23

## 2019-07-23 PROCEDURE — 99213 OFFICE O/P EST LOW 20 MIN: CPT | Performed by: INTERNAL MEDICINE

## 2019-07-23 NOTE — ASSESSMENT & PLAN NOTE
Had EUS with [facility] few months ago which came back as benign  She follows with Dr Sara Michael and had a negative follow-up CT scan this month      -follow up with Dr Sara Michael prn

## 2019-07-23 NOTE — ASSESSMENT & PLAN NOTE
Last colonoscopy in July 2018 was negative for any polyps      -due for repeat colonoscopy in 5 years and will place her in the reminder list     -advised patient to call if she develops any GI symptoms

## 2019-07-23 NOTE — PROGRESS NOTES
Follow-up Note -  [facility]  Jay Hernandez 1945 female         Reason:  Follow-up    HPI:  Ms Izora Lennox was seen by Dr Bess Medina in March regarding right retroperitoneal lesion and had any EUS/FNA with benign pathology  She was just seen by Dr Adriana Astorga as a follow-up and had a repeat CT scan done which showed resolution of the lesion  Denies abdominal pain, nausea or vomiting  Occasional acid reflux and she takes Tums as needed  Denies any difficulty swallowing  Regular bowel movements and denies any blood or mucus in the stool  She has history of colon polyps on last colonoscopy was in July 2018 by [place]: Review of Systems   Constitutional: Negative for activity change, appetite change, chills, diaphoresis, fatigue, fever and unexpected weight change  HENT: Negative for ear discharge, ear pain, facial swelling, hearing loss, nosebleeds, sore throat, tinnitus and voice change  Eyes: Negative for pain, discharge, redness, itching and visual disturbance  Respiratory: Positive for chest tightness and shortness of breath  Negative for apnea, cough and wheezing  Cardiovascular: Negative for chest pain and palpitations  Gastrointestinal:        As noted in HPI   Endocrine: Negative for cold intolerance, heat intolerance and polyuria  Genitourinary: Negative for difficulty urinating, dysuria, flank pain, hematuria and urgency  Musculoskeletal: Negative for arthralgias, back pain, gait problem, joint swelling and myalgias  Skin: Negative for rash and wound  Neurological: Negative for dizziness, tremors, seizures, speech difficulty, light-headedness, numbness and headaches  Hematological: Negative for adenopathy  Does not bruise/bleed easily  Psychiatric/Behavioral: Negative for agitation, behavioral problems and confusion  The patient is not nervous/anxious           Past Medical History:   Diagnosis Date    Colon polyps     Gallbladder polyp     Kidney cysts       Past Surgical History:   Procedure Laterality Date    COLONOSCOPY W/ POLYPECTOMY      [place] EDG US EXAM SURGICAL ALTER STOM DUODENUM/JEJUNUM N/A 3/14/2019    Procedure: LINEAR ENDOSCOPIC U/S;  Surgeon: Shahla Sorto MD;  Location: [facility];   Service: Gastroenterology     Social History     Socioeconomic History    Marital status: /Civil Union     Spouse name: Not on file    Number of children: Not on file    Years of education: Not on file    Highest education level: Not on file   Occupational History    Not on file   Social Needs    Financial resource strain: Not on file    Food insecurity:     Worry: Not on file     Inability: Not on file    Transportation needs:     Medical: Not on file     Non-medical: Not on file   Tobacco Use    Smoking status: Never Smoker    Smokeless tobacco: Never Used   Substance and Sexual Activity    Alcohol use: Yes     Comment: social - rare     Drug use: No    Sexual activity: Not on file   Lifestyle    Physical activity:     Days per week: Not on file     Minutes per session: Not on file    Stress: Not on file   Relationships    Social connections:     Talks on phone: Not on file     Gets together: Not on file     Attends Buddhism service: Not on file     Active member of club or organization: Not on file     Attends meetings of clubs or organizations: Not on file     Relationship status: Not on file    Intimate partner violence:     Fear of current or ex partner: Not on file     Emotionally abused: Not on file     Physically abused: Not on file     Forced sexual activity: Not on file   Other Topics Concern    Not on file   Social History Narrative    Not on file     Family History   Problem Relation Age of Onset    Hyperlipidemia Mother     Heart attack Father     Other Sister         open heart surgery     Heart murmur Sister     Stroke Sister     Hyperlipidemia Sister         all siblings     Breast cancer Sister 79    Heart attack Brother         open heart surgery     Deep vein thrombosis Brother     Hyperlipidemia Brother     Coronary artery disease Brother         stents placed     Heart attack Paternal Aunt     Heart attack Paternal Uncle     Hypertension Family         all in the family both sides    Anuerysm Neg Hx     Heart failure Neg Hx      Imdur [isosorbide nitrate]; Sulfa antibiotics; Ciprofloxacin; and Macrobid [nitrofurantoin]  Current Outpatient Medications   Medication Sig Dispense Refill    Aspirin (ASPIR-81 PO) Take 81 mg by mouth daily      benazepril (LOTENSIN) 40 MG tablet Take 2 tablets by mouth daily in the early morning      Biotin 10 MG CAPS Take 1 capsule by mouth daily 5000 mcg daily       Cholecalciferol (VITAMIN D3) 1000 units CAPS Take 1 capsule by mouth daily      Coenzyme Q10 (COQ-10) 100 MG CAPS Take 1 capsule by mouth daily      cyanocobalamin (VITAMIN B-12) 1,000 mcg tablet Take 1,000 mcg by mouth daily      escitalopram (LEXAPRO) 10 mg tablet Take 1 tablet by mouth daily at bedtime      ezetimibe (ZETIA) 10 mg tablet       Lutein 6 MG TABS Take 20 mg by mouth daily      Magnesium Oxide -Mg Supplement 400 MG CAPS Take 1 capsule by mouth daily      metoprolol succinate (TOPROL-XL) 25 mg 24 hr tablet Take 1 tablet (25 mg total) by mouth daily at bedtime      niacin 500 mg tablet Take 500 mg by mouth daily with breakfast      Omega 3 1200 MG CAPS Take 1 capsule by mouth daily      Probiotic Product (PROBIOTIC-10) CAPS Take by mouth daily      Red Yeast Rice 600 MG CAPS Take 2 capsules by mouth daily      Turmeric 500 MG CAPS Take 500 mg by mouth daily       No current facility-administered medications for this visit  Blood pressure 126/60, pulse 70, temperature 98 1 °F (36 7 °C), temperature source Tympanic, resp  rate 16, height 5' 3" (1 6 m), weight 58 9 kg (129 lb 12 8 oz)      PHYSICAL EXAM: Physical Exam   Constitutional: She is oriented to person, place, and time  She appears well-developed and well-nourished  HENT:   Head: Normocephalic and atraumatic  Nose: Nose normal    Mouth/Throat: Oropharynx is clear and moist    Eyes: Conjunctivae are normal  Right eye exhibits no discharge  Left eye exhibits no discharge  No scleral icterus  Neck: Neck supple  No JVD present  No tracheal deviation present  No thyromegaly present  Cardiovascular: Normal rate, regular rhythm and normal heart sounds  Exam reveals no gallop and no friction rub  No murmur heard  Pulmonary/Chest: Effort normal and breath sounds normal  No respiratory distress  She has no wheezes  She has no rales  She exhibits no tenderness  Abdominal: Soft  Bowel sounds are normal  She exhibits no distension and no mass  There is no tenderness  There is no rebound and no guarding  No hernia  Musculoskeletal: She exhibits no edema, tenderness or deformity  Lymphadenopathy:     She has no cervical adenopathy  Neurological: She is alert and oriented to person, place, and time  Skin: Skin is warm and dry  No rash noted  No erythema  Psychiatric: She has a normal mood and affect  Her behavior is normal  Thought content normal         Lab Results   Component Value Date    WBC 6 71 07/06/2019    HGB 11 9 07/06/2019    HCT 38 2 07/06/2019     (H) 07/06/2019     07/06/2019     Lab Results   Component Value Date    CALCIUM 8 9 07/06/2019    K 3 9 07/06/2019    CO2 29 07/06/2019     07/06/2019    BUN 22 07/06/2019    CREATININE 0 97 07/06/2019     No results found for: ALT, AST, GGT, ALKPHOS, BILITOT  No results found for: INR, PROTIME    Ct Abdomen Pelvis W Contrast    Result Date: 7/10/2019  Impression: Previously seen right retroperitoneal lesion is no longer apparent status post biopsy  No new findings in the abdomen or the pelvis   Workstation performed: [id number]       ASSESSMENT & PLAN:    Retroperitoneal mass  Had EUS with FNA few months ago which came back as benign  She follows with Dr Batool Galeana and had a negative follow-up CT scan this month  -follow up with Dr Batool Galeana prn    History of colon polyps  Last colonoscopy in July 2018 was negative for any polyps      -due for repeat colonoscopy in 5 years and will place her in the reminder list     -advised patient to call if she develops any GI symptoms

## 2019-09-03 ENCOUNTER — OFFICE VISIT (OUTPATIENT)
Dept: CARDIOLOGY CLINIC | Facility: MEDICAL CENTER | Age: 74
End: 2019-09-03
Payer: COMMERCIAL

## 2019-09-03 VITALS
WEIGHT: 131.5 LBS | SYSTOLIC BLOOD PRESSURE: 126 MMHG | HEIGHT: 63 IN | OXYGEN SATURATION: 98 % | HEART RATE: 80 BPM | DIASTOLIC BLOOD PRESSURE: 80 MMHG | BODY MASS INDEX: 23.3 KG/M2

## 2019-09-03 DIAGNOSIS — E78.2 MIXED HYPERLIPIDEMIA: ICD-10-CM

## 2019-09-03 DIAGNOSIS — I73.9 PAD (PERIPHERAL ARTERY DISEASE) (HCC): Primary | ICD-10-CM

## 2019-09-03 DIAGNOSIS — I10 ESSENTIAL HYPERTENSION: ICD-10-CM

## 2019-09-03 PROCEDURE — 3079F DIAST BP 80-89 MM HG: CPT | Performed by: INTERNAL MEDICINE

## 2019-09-03 PROCEDURE — 99214 OFFICE O/P EST MOD 30 MIN: CPT | Performed by: INTERNAL MEDICINE

## 2019-09-03 PROCEDURE — 3074F SYST BP LT 130 MM HG: CPT | Performed by: INTERNAL MEDICINE

## 2019-09-03 PROCEDURE — 93000 ELECTROCARDIOGRAM COMPLETE: CPT | Performed by: INTERNAL MEDICINE

## 2019-09-03 NOTE — PROGRESS NOTES
Cardiology   Ange Whitaker 72 y o  female MRN: [id number]        Impression:  1  Chest pain - no evidence of myocardial ischemia   However, may have microvascular ischemia  2  Hypertension - good control  3  Dyslipidemia - on Zetia  4  Moderate Bilateral LE PAD by vascular study - no PAD by vascular surgeon      Recommendations:  1  Continue current medications  2  Follow up in one year  HPI: Ange Whitaker is a 72y o  year old female with a history of hypertension and dyslipidemia who presents for follow up  [facility] pharmacologic stress test instead of an exercise stress test b/c of elevated blood pressure - no ischemia  Able to go to the gym without difficulty   Major complaint is leg pain and fatigue  Review of Systems   Constitutional: Negative  HENT: Negative  Eyes: Negative  Respiratory: Negative for chest tightness and shortness of breath  Cardiovascular: Negative for chest pain, palpitations and leg swelling  Gastrointestinal: Negative  Endocrine: Negative  Genitourinary: Negative  Musculoskeletal: Negative  Skin: Negative  Allergic/Immunologic: Negative  Neurological: Negative  Hematological: Negative  Psychiatric/Behavioral: Negative  All other systems reviewed and are negative  Past Medical History:   Diagnosis Date    Colon polyps     Gallbladder polyp     Kidney cysts      Past Surgical History:   Procedure Laterality Date    COLONOSCOPY W/ POLYPECTOMY      [place] EDG US EXAM SURGICAL ALTER STOM DUODENUM/JEJUNUM N/A 3/14/2019    Procedure: LINEAR ENDOSCOPIC U/S;  Surgeon: Elder Mcardle, MD;  Location: [facility];   Service: Gastroenterology     Social History     Substance and Sexual Activity   Alcohol Use Yes    Comment: social - rare      Social History     Substance and Sexual Activity   Drug Use No     Social History     Tobacco Use   Smoking Status Never Smoker   Smokeless Tobacco Never Used     Family History   Problem Relation Age of Onset    Hyperlipidemia Mother     Heart attack Father     Other Sister         open heart surgery     Heart murmur Sister     Stroke Sister     Hyperlipidemia Sister         all siblings     Breast cancer Sister 79    Heart attack Brother         open heart surgery     Deep vein thrombosis Brother     Hyperlipidemia Brother     Coronary artery disease Brother         stents placed     Heart attack Paternal Aunt     Heart attack Paternal Uncle     Hypertension Family         all in the family both sides    Anuerysm Neg Hx     Heart failure Neg Hx        Allergies:   Allergies   Allergen Reactions    Imdur [Isosorbide Nitrate] Headache    Sulfa Antibiotics Hives    Ciprofloxacin Rash and GI Intolerance    Macrobid [Nitrofurantoin] Other (See Comments)     Pt does not recall reaction       Medications:     Current Outpatient Medications:     Aspirin (ASPIR-81 PO), Take 81 mg by mouth daily, Disp: , Rfl:     benazepril (LOTENSIN) 40 MG tablet, Take 2 tablets by mouth daily in the early morning, Disp: , Rfl:     Biotin 10 MG CAPS, Take 1 capsule by mouth daily 5000 mcg daily , Disp: , Rfl:     Cholecalciferol (VITAMIN D3) 1000 units CAPS, Take 1 capsule by mouth daily, Disp: , Rfl:     Coenzyme Q10 (COQ-10) 100 MG CAPS, Take 1 capsule by mouth daily, Disp: , Rfl:     cyanocobalamin (VITAMIN B-12) 1,000 mcg tablet, Take 1,000 mcg by mouth daily, Disp: , Rfl:     escitalopram (LEXAPRO) 10 mg tablet, Take 1 tablet by mouth daily at bedtime, Disp: , Rfl:     ezetimibe (ZETIA) 10 mg tablet, , Disp: , Rfl:     Lutein 6 MG TABS, Take 20 mg by mouth daily, Disp: , Rfl:     Magnesium Oxide -Mg Supplement 400 MG CAPS, Take 1 capsule by mouth daily, Disp: , Rfl:     metoprolol succinate (TOPROL-XL) 25 mg 24 hr tablet, Take 1 tablet (25 mg total) by mouth daily at bedtime, Disp: , Rfl:     niacin 500 mg tablet, Take 500 mg by mouth daily with breakfast, Disp: , Rfl:     Omega 3 1200 MG CAPS, Take 1 capsule by mouth daily, Disp: , Rfl:     Probiotic Product (PROBIOTIC-10) CAPS, Take by mouth daily, Disp: , Rfl:     Red Yeast Rice 600 MG CAPS, Take 2 capsules by mouth daily, Disp: , Rfl:     Turmeric 500 MG CAPS, Take 500 mg by mouth daily, Disp: , Rfl:       Wt Readings from Last 3 Encounters:   09/03/19 59 6 kg (131 lb 8 oz)   07/23/19 58 9 kg (129 lb 12 8 oz)   07/17/19 58 5 kg (129 lb)     Temp Readings from Last 3 Encounters:   07/23/19 98 1 °F (36 7 °C) (Tympanic)   07/17/19 97 5 °F (36 4 °C)   04/17/19 97 6 °F (36 4 °C) (Temporal)     BP Readings from Last 3 Encounters:   09/03/19 126/80   07/23/19 126/60   07/17/19 118/82     Pulse Readings from Last 3 Encounters:   09/03/19 80   07/23/19 70   07/17/19 (!) 110         Physical Exam   Constitutional: She is oriented to person, place, and time  She appears well-developed  HENT:   Head: Atraumatic  Eyes: EOM are normal    Neck: Normal range of motion  Cardiovascular: Normal rate, regular rhythm and normal heart sounds  Exam reveals no friction rub  No murmur heard  Pulmonary/Chest: Effort normal and breath sounds normal  No stridor  No respiratory distress  Abdominal: Soft  Musculoskeletal: Normal range of motion  Neurological: She is alert and oriented to person, place, and time  Skin: Skin is warm and dry  Psychiatric: She has a normal mood and affect           Laboratory Studies:  CMP:  Lab Results   Component Value Date    K 3 9 07/06/2019     07/06/2019    CO2 29 07/06/2019    BUN 22 07/06/2019    CREATININE 0 97 07/06/2019    EGFR 59 07/06/2019       Lipid Profile:   No results found for: CHOL  Lab Results   Component Value Date    HDL 46 07/06/2019     Lab Results   Component Value Date    LDLCALC 95 07/06/2019     Lab Results   Component Value Date    TRIG 119 07/06/2019       Cardiac testing:   EKG reviewed personally: PASQUALE Freedman Nml

## 2019-09-03 NOTE — PATIENT INSTRUCTIONS
Impression:  1  Chest pain - no evidence of myocardial ischemia   However, may have microvascular ischemia  2  Hypertension - good control  3  Dyslipidemia - on Zetia  4  Moderate Bilateral LE PAD by vascular study - no PAD by vascular surgeon      Recommendations:  1  Continue current medications  2  Follow up in one year

## 2019-09-20 ENCOUNTER — OFFICE VISIT (OUTPATIENT)
Dept: URGENT CARE | Facility: MEDICAL CENTER | Age: 74
End: 2019-09-20
Payer: COMMERCIAL

## 2019-09-20 VITALS
WEIGHT: 131.13 LBS | RESPIRATION RATE: 20 BRPM | SYSTOLIC BLOOD PRESSURE: 120 MMHG | HEART RATE: 72 BPM | BODY MASS INDEX: 23.23 KG/M2 | OXYGEN SATURATION: 99 % | TEMPERATURE: 98.3 F | HEIGHT: 63 IN | DIASTOLIC BLOOD PRESSURE: 70 MMHG

## 2019-09-20 DIAGNOSIS — M25.512 ACUTE PAIN OF LEFT SHOULDER: Primary | ICD-10-CM

## 2019-09-20 PROCEDURE — 99213 OFFICE O/P EST LOW 20 MIN: CPT | Performed by: PHYSICIAN ASSISTANT

## 2019-09-20 RX ORDER — CYCLOBENZAPRINE HCL 10 MG
10 TABLET ORAL 3 TIMES DAILY PRN
Qty: 30 TABLET | Refills: 0 | Status: SHIPPED | OUTPATIENT
Start: 2019-09-20 | End: 2020-11-18 | Stop reason: ALTCHOICE

## 2019-09-20 NOTE — PROGRESS NOTES
[facility]        NAME: Leigh Ann Dominguez is a 72 y o  female  : 1945    MRN: [id number]  DATE: 2019  TIME: 10:16 AM    Assessment and Plan   Acute pain of left shoulder [M25 512]  1  Acute pain of left shoulder  cyclobenzaprine (FLEXERIL) 10 mg tablet         Patient Instructions     Please use heating pad on shoulder   use Motrin daily for pain   may use muscle relaxer at night as this may make you sleepy  Follow up with PCP if symptoms do not resolve    Chief Complaint     Chief Complaint   Patient presents with    Shoulder Pain     x 2days to left shoulder blade  felt dizzy and nausea this am due to pain  pt stated that she didnt have any injury   pain comes and goes  moving arm backward pain gets worse  able to move left arm straight up          History of Present Illness        Patient is a 72-year-old  Female who presents today for left shoulder pain  Patient reports pain started approximately 4 days ago  She does report pain started the day she was lifting some weights at the gym  Did not happen directly after this  She denies any numbness or tingling in the arm  She denies any chest pain or shortness of breath  She reports pain with certain movements  She has tried 2 Tylenol this morning with some relief  She does report she had a spell of dizziness this morning, she states this is when the pain was very strong  No current dizziness  Review of Systems   Review of Systems   Constitutional: Negative for fever  Respiratory: Negative for shortness of breath  Cardiovascular: Negative for chest pain  Musculoskeletal: Positive for arthralgias  Negative for joint swelling  Skin: Negative for color change  Neurological: Positive for dizziness  Negative for weakness and numbness           Current Medications       Current Outpatient Medications:     Aspirin (ASPIR-81 PO), Take 81 mg by mouth daily, Disp: , Rfl:     benazepril (LOTENSIN) 40 MG tablet, Take 2 tablets by mouth daily in the early morning, Disp: , Rfl:     Biotin 10 MG CAPS, Take 1 capsule by mouth daily 5000 mcg daily , Disp: , Rfl:     Cholecalciferol (VITAMIN D3) 1000 units CAPS, Take 1 capsule by mouth daily, Disp: , Rfl:     Coenzyme Q10 (COQ-10) 100 MG CAPS, Take 1 capsule by mouth daily, Disp: , Rfl:     cyanocobalamin (VITAMIN B-12) 1,000 mcg tablet, Take 1,000 mcg by mouth daily, Disp: , Rfl:     escitalopram (LEXAPRO) 10 mg tablet, Take 1 tablet by mouth daily at bedtime, Disp: , Rfl:     ezetimibe (ZETIA) 10 mg tablet, , Disp: , Rfl:     Lutein 6 MG TABS, Take 20 mg by mouth daily, Disp: , Rfl:     Magnesium Oxide -Mg Supplement 400 MG CAPS, Take 1 capsule by mouth daily, Disp: , Rfl:     metoprolol succinate (TOPROL-XL) 25 mg 24 hr tablet, Take 1 tablet (25 mg total) by mouth daily at bedtime, Disp: , Rfl:     niacin 500 mg tablet, Take 500 mg by mouth daily with breakfast, Disp: , Rfl:     Omega 3 1200 MG CAPS, Take 1 capsule by mouth daily, Disp: , Rfl:     Probiotic Product (PROBIOTIC-10) CAPS, Take by mouth daily, Disp: , Rfl:     Red Yeast Rice 600 MG CAPS, Take 2 capsules by mouth daily, Disp: , Rfl:     Turmeric 500 MG CAPS, Take 500 mg by mouth daily, Disp: , Rfl:     cyclobenzaprine (FLEXERIL) 10 mg tablet, Take 1 tablet (10 mg total) by mouth 3 (three) times a day as needed for muscle spasms, Disp: 30 tablet, Rfl: 0    Current Allergies     Allergies as of 09/20/2019 - Reviewed 09/20/2019   Allergen Reaction Noted    Imdur [isosorbide nitrate] Headache 09/05/2018    Sulfa antibiotics Hives 04/19/2017    Ciprofloxacin Rash and GI Intolerance 02/15/2019    Macrobid [nitrofurantoin] Other (See Comments) 04/04/2019            The following portions of the patient's history were reviewed and updated as appropriate: allergies, current medications, past family history, past medical history, past social history, past surgical history and problem list      Past Medical History:   Diagnosis Date    Colon polyps     Gallbladder polyp     Kidney cysts        Past Surgical History:   Procedure Laterality Date    COLONOSCOPY W/ POLYPECTOMY      [place] EDG US EXAM SURGICAL ALTER STOM DUODENUM/JEJUNUM N/A 3/14/2019    Procedure: LINEAR ENDOSCOPIC U/S;  Surgeon: Chery Lanier MD;  Location: [facility]; Service: Gastroenterology       Family History   Problem Relation Age of Onset    Hyperlipidemia Mother     Heart attack Father     Other Sister         open heart surgery     Heart murmur Sister     Stroke Sister     Hyperlipidemia Sister         all siblings     Breast cancer Sister 79    Heart attack Brother         open heart surgery     Deep vein thrombosis Brother     Hyperlipidemia Brother     Coronary artery disease Brother         stents placed     Heart attack Paternal Aunt     Heart attack Paternal Uncle     Hypertension Family         all in the family both sides    Anuerysm Neg Hx     Heart failure Neg Hx          Medications have been verified  Objective   /70   Pulse 72   Temp 98 3 °F (36 8 °C)   Resp 20   Ht 5' 3" (1 6 m)   Wt 59 5 kg (131 lb 2 oz)   SpO2 99%   BMI 23 23 kg/m²        Physical Exam     Physical Exam   Constitutional: She is oriented to person, place, and time  She appears well-developed and well-nourished  HENT:   Head: Normocephalic and atraumatic  Cardiovascular: Normal rate and regular rhythm  Pulmonary/Chest: Effort normal and breath sounds normal    Musculoskeletal:        Left shoulder: She exhibits tenderness and pain  She exhibits normal range of motion, no bony tenderness, no swelling, no effusion, no crepitus and normal strength  +Pain elicited with flexion of left shoulder   Neurological: She is alert and oriented to person, place, and time  She has normal strength  No cranial nerve deficit  Skin: Skin is warm and dry  Capillary refill takes less than 2 seconds

## 2019-09-20 NOTE — PATIENT INSTRUCTIONS
Please use heating pad on shoulder   use Motrin daily for pain   may use muscle relaxer at night as this may make you sleepy  Follow up with PCP if symptoms do not resolve    Shoulder Pain   WHAT YOU NEED TO KNOW:   Shoulder pain is a common problem and can affect your daily activities  Pain can be caused by a problem within your shoulder  Shoulder pain may also be caused by pain that spreads to your shoulder from another part of your body  DISCHARGE INSTRUCTIONS:   Return to the emergency department if:   · You have severe pain  · You cannot move your arm or shoulder  · You have numbness or tingling in your shoulder or arm  Contact your healthcare provider if:   · Your pain gets worse or does not go away with treatment  · You have trouble moving your arm or shoulder  · You have questions or concerns about your condition or care  Medicines: You may need any of the following:  · Acetaminophen  decreases pain and fever  It is available without a doctor's order  Ask how much to take and how often to take it  Follow directions  Acetaminophen can cause liver damage if not taken correctly  · NSAIDs , such as ibuprofen, help decrease swelling, pain, and fever  This medicine is available with or without a doctor's order  NSAIDs can cause stomach bleeding or kidney problems in certain people  If you take blood thinner medicine, always ask your healthcare provider if NSAIDs are safe for you  Always read the medicine label and follow directions  · Take your medicine as directed  Contact your healthcare provider if you think your medicine is not helping or if you have side effects  Tell him of her if you are allergic to any medicine  Keep a list of the medicines, vitamins, and herbs you take  Include the amounts, and when and why you take them  Bring the list or the pill bottles to follow-up visits  Carry your medicine list with you in case of an emergency    Follow up with your healthcare provider or orthopedist as directed:  Write down your questions so you remember to ask them during your visits  Manage your symptoms:   · Apply ice  on your shoulder for 20 to 30 minutes every 2 hours or as directed  Use an ice pack, or put crushed ice in a plastic bag  Cover it with a towel  Ice helps prevent tissue damage and decreases swelling and pain  · Apply heat if ice does not help your symptoms  Apply heat on your shoulder for 20 to 30 minutes every 2 hours for as many days as directed  Heat helps decrease pain and muscle spasms  · Go to physical or occupational therapy as directed  A physical therapist teaches you exercises to help improve movement and strength, and to decrease pain  An occupational therapist teaches you skills to help with your daily activities  Prevent shoulder pain:   · Stretch and strengthen your shoulder  Use proper technique during exercises and sports  · Limit activities as directed  Try to avoid repeated overhead movements  © 2017 [employer] [employer]  Information is for End User's use only and may not be sold, redistributed or otherwise used for commercial purposes  All illustrations and images included in CareNotes® are the copyrighted property of [employer] , [employer]  or [employer]  The above information is an  only  It is not intended as medical advice for individual conditions or treatments  Talk to your doctor, nurse or pharmacist before following any medical regimen to see if it is safe and effective for you

## 2020-02-18 ENCOUNTER — OFFICE VISIT (OUTPATIENT)
Dept: UROLOGY | Facility: CLINIC | Age: 75
End: 2020-02-18
Payer: COMMERCIAL

## 2020-02-18 VITALS
DIASTOLIC BLOOD PRESSURE: 72 MMHG | HEIGHT: 63 IN | WEIGHT: 130.2 LBS | BODY MASS INDEX: 23.07 KG/M2 | SYSTOLIC BLOOD PRESSURE: 124 MMHG | HEART RATE: 64 BPM

## 2020-02-18 DIAGNOSIS — R31.0 GROSS HEMATURIA: Primary | ICD-10-CM

## 2020-02-18 LAB
SL AMB  POCT GLUCOSE, UA: NORMAL
SL AMB LEUKOCYTE ESTERASE,UA: NORMAL
SL AMB POCT BILIRUBIN,UA: NORMAL
SL AMB POCT BLOOD,UA: NORMAL
SL AMB POCT CLARITY,UA: CLEAR
SL AMB POCT COLOR,UA: YELLOW
SL AMB POCT KETONES,UA: NORMAL
SL AMB POCT NITRITE,UA: NORMAL
SL AMB POCT PH,UA: 5
SL AMB POCT SPECIFIC GRAVITY,UA: 1.01
SL AMB POCT URINE PROTEIN: NORMAL
SL AMB POCT UROBILINOGEN: 0.2

## 2020-02-18 PROCEDURE — 1160F RVW MEDS BY RX/DR IN RCRD: CPT | Performed by: PHYSICIAN ASSISTANT

## 2020-02-18 PROCEDURE — 99213 OFFICE O/P EST LOW 20 MIN: CPT | Performed by: PHYSICIAN ASSISTANT

## 2020-02-18 PROCEDURE — 81002 URINALYSIS NONAUTO W/O SCOPE: CPT | Performed by: PHYSICIAN ASSISTANT

## 2020-02-18 NOTE — PROGRESS NOTES
1  Gross hematuria  POCT urine dip       Assessment and plan:       1  Renal cyst   2  History of microscopic hematuria  3  Gross hematuria   - s/p negative CT urogram and cystoscopy (2019)    Patient has been doing well over the past year  Renal cyst has been stable on surveillance imaging  No further evaluation needed  Patient has undergone complete hematuria workup in January of 2019 without any abnormalities  Patient can follow with Urology on as-needed basis  Encouraged annual urine studies through primary care provider  If has persistent microscopic hematuria in 5 years, referred back to Urology for repeat hematuria workup  Kimberly Celis PA-C      Chief Complaint     F/u gross heamturia    History of Present Illness     Stone Blair is a 69 y o  female patient of Dr Flor Mendez with a history of overactive bladder, renal cysts, and nephrolithiasis presenting for follow up  Patient has a history of complicated left renal cyst since 2008  This was previously followed by another urologist, Dr Danilo See, and she transferred her care to our practice in April 2017  Patient had microscopic hematuria longstanding with previous workup  In 2019 she had an episode of gross hematuria and underwent repeat evaluation with CT urogram and cystoscopy  CT was negative urologic abnormalities however a retroperitoneal nodular lesion was noted and was referred to surgical oncology - biopsied and was benign  Cystoscopy negative for any lower urinary tract lesions  Patient has been doing well over the past year  Denies any recurrent episodes of hematuria  Denies any dysuria, suprapubic pressure, nausea, vomiting, fevers, or chills  Denies any urinary tract infections stents  Laboratory         Review of Systems     Review of Systems   Constitutional: Negative for activity change, appetite change, chills, diaphoresis, fatigue, fever and unexpected weight change     Respiratory: Negative for chest tightness and shortness of breath  Cardiovascular: Negative for chest pain, palpitations and leg swelling  Gastrointestinal: Negative for abdominal distention, abdominal pain, constipation, diarrhea, nausea and vomiting  Genitourinary: Negative for decreased urine volume, difficulty urinating, dysuria, enuresis, flank pain, frequency, genital sores, hematuria and urgency  Musculoskeletal: Negative for back pain, gait problem and myalgias  Skin: Negative for color change, pallor, rash and wound  Psychiatric/Behavioral: Negative for behavioral problems  The patient is not nervous/anxious  Allergies     Allergies   Allergen Reactions    Imdur [Isosorbide Nitrate] Headache    Sulfa Antibiotics Hives    Ciprofloxacin Rash and GI Intolerance    Macrobid [Nitrofurantoin] Other (See Comments)     Pt does not recall reaction       Physical Exam     Physical Exam   Constitutional: She is oriented to person, place, and time  She appears well-developed and well-nourished  No distress  HENT:   Head: Normocephalic and atraumatic  Eyes: Conjunctivae are normal    Neck: Normal range of motion  No tracheal deviation present  Pulmonary/Chest: Effort normal    Musculoskeletal: Normal range of motion  She exhibits no edema or deformity  Neurological: She is alert and oriented to person, place, and time  Skin: Skin is warm and dry  She is not diaphoretic  No erythema  No pallor  Psychiatric: She has a normal mood and affect   Her behavior is normal          Vital Signs     Vitals:    02/18/20 1305   BP: 124/72   BP Location: Left arm   Patient Position: Sitting   Cuff Size: Standard   Pulse: 64   Weight: 59 1 kg (130 lb 3 2 oz)   Height: 5' 3" (1 6 m)         Current Medications       Current Outpatient Medications:     Aspirin (ASPIR-81 PO), Take 81 mg by mouth daily, Disp: , Rfl:     benazepril (LOTENSIN) 40 MG tablet, Take 2 tablets by mouth daily in the early morning, Disp: , Rfl:     Biotin 10 MG CAPS, Take 1 capsule by mouth daily 5000 mcg daily , Disp: , Rfl:     Cholecalciferol (VITAMIN D3) 1000 units CAPS, Take 1 capsule by mouth daily, Disp: , Rfl:     Coenzyme Q10 (COQ-10) 100 MG CAPS, Take 1 capsule by mouth daily, Disp: , Rfl:     cyanocobalamin (VITAMIN B-12) 1,000 mcg tablet, Take 1,000 mcg by mouth daily, Disp: , Rfl:     cyclobenzaprine (FLEXERIL) 10 mg tablet, Take 1 tablet (10 mg total) by mouth 3 (three) times a day as needed for muscle spasms, Disp: 30 tablet, Rfl: 0    escitalopram (LEXAPRO) 10 mg tablet, Take 1 tablet by mouth daily at bedtime, Disp: , Rfl:     ezetimibe (ZETIA) 10 mg tablet, , Disp: , Rfl:     Lutein 6 MG TABS, Take 20 mg by mouth daily, Disp: , Rfl:     Magnesium Oxide -Mg Supplement 400 MG CAPS, Take 1 capsule by mouth daily, Disp: , Rfl:     metoprolol succinate (TOPROL-XL) 25 mg 24 hr tablet, Take 1 tablet (25 mg total) by mouth daily at bedtime, Disp: , Rfl:     niacin 500 mg tablet, Take 500 mg by mouth daily with breakfast, Disp: , Rfl:     Omega 3 1200 MG CAPS, Take 1 capsule by mouth daily, Disp: , Rfl:     Probiotic Product (PROBIOTIC-10) CAPS, Take by mouth daily, Disp: , Rfl:     Red Yeast Rice 600 MG CAPS, Take 2 capsules by mouth daily, Disp: , Rfl:     Turmeric 500 MG CAPS, Take 500 mg by mouth daily, Disp: , Rfl:       Active Problems     Patient Active Problem List   Diagnosis    Other chest pain    Essential hypertension    PAD (peripheral artery disease) ([facility] [address] )    Gross hematuria    Anxiety disorder    Hyperlipidemia    Hypothyroidism    Retroperitoneal mass    History of colon polyps       Past Medical History     Past Medical History:   Diagnosis Date    Colon polyps     Gallbladder polyp     Kidney cysts          Surgical History     Past Surgical History:   Procedure Laterality Date    COLONOSCOPY W/ POLYPECTOMY      RI EDG US EXAM SURGICAL ALTER STOM DUODENUM/JEJUNUM N/A 3/14/2019    Procedure: LINEAR ENDOSCOPIC U/S; Surgeon: Adrienne Perdomo MD;  Location: [facility];   Service: Gastroenterology         Family History     Family History   Problem Relation Age of Onset    Hyperlipidemia Mother     Heart attack Father     Other Sister         open heart surgery     Heart murmur Sister     Stroke Sister     Hyperlipidemia Sister         all siblings     Breast cancer Sister 79    Heart attack Brother         open heart surgery     Deep vein thrombosis Brother     Hyperlipidemia Brother     Coronary artery disease Brother         stents placed     Heart attack Paternal Aunt     Heart attack Paternal Uncle     Hypertension Family         all in the family both sides    Anuerysm Neg Hx     Heart failure Neg Hx        Social History     Social History       Radiology

## 2020-05-14 DIAGNOSIS — E78.5 HYPERLIPIDEMIA, UNSPECIFIED HYPERLIPIDEMIA TYPE: Primary | ICD-10-CM

## 2020-05-14 RX ORDER — EZETIMIBE 10 MG/1
10 TABLET ORAL DAILY
Qty: 90 TABLET | Refills: 1 | Status: SHIPPED | OUTPATIENT
Start: 2020-05-14 | End: 2020-11-11 | Stop reason: SDUPTHER

## 2020-05-20 LAB
ALBUMIN SERPL-MCNC: 4 G/DL (ref 3.6–5.1)
ALBUMIN/GLOB SERPL: 1.5 (CALC) (ref 1–2.5)
ALP SERPL-CCNC: 57 U/L (ref 37–153)
ALT SERPL-CCNC: 13 U/L (ref 6–29)
APPEARANCE UR: CLEAR
AST SERPL-CCNC: 17 U/L (ref 10–35)
BACTERIA UR CULT: ABNORMAL
BACTERIA UR QL AUTO: ABNORMAL /HPF
BASOPHILS # BLD AUTO: 39 CELLS/UL (ref 0–200)
BASOPHILS NFR BLD AUTO: 0.7 %
BILIRUB SERPL-MCNC: 0.6 MG/DL (ref 0.2–1.2)
BILIRUB UR QL STRIP: NEGATIVE
BUN SERPL-MCNC: 20 MG/DL (ref 7–25)
BUN/CREAT SERPL: 20 (CALC) (ref 6–22)
CALCIUM SERPL-MCNC: 9.3 MG/DL (ref 8.6–10.4)
CHLORIDE SERPL-SCNC: 104 MMOL/L (ref 98–110)
CHOLEST SERPL-MCNC: 156 MG/DL
CHOLEST/HDLC SERPL: 3.8 (CALC)
CO2 SERPL-SCNC: 31 MMOL/L (ref 20–32)
COLOR UR: YELLOW
CREAT SERPL-MCNC: 1 MG/DL (ref 0.6–0.93)
EOSINOPHIL # BLD AUTO: 138 CELLS/UL (ref 15–500)
EOSINOPHIL NFR BLD AUTO: 2.5 %
ERYTHROCYTE [DISTWIDTH] IN BLOOD BY AUTOMATED COUNT: 12.5 % (ref 11–15)
GLOBULIN SER CALC-MCNC: 2.6 G/DL (CALC) (ref 1.9–3.7)
GLUCOSE SERPL-MCNC: 85 MG/DL (ref 65–99)
GLUCOSE UR QL STRIP: NEGATIVE
HCT VFR BLD AUTO: 36.8 % (ref 35–45)
HDLC SERPL-MCNC: 41 MG/DL
HGB BLD-MCNC: 12.2 G/DL (ref 11.7–15.5)
HGB UR QL STRIP: ABNORMAL
HYALINE CASTS #/AREA URNS LPF: ABNORMAL /LPF
KETONES UR QL STRIP: NEGATIVE
LDLC SERPL CALC-MCNC: 96 MG/DL (CALC)
LEUKOCYTE ESTERASE UR QL STRIP: NEGATIVE
LYMPHOCYTES # BLD AUTO: 2415 CELLS/UL (ref 850–3900)
LYMPHOCYTES NFR BLD AUTO: 43.9 %
MAGNESIUM SERPL-MCNC: 1.9 MG/DL (ref 1.5–2.5)
MCH RBC QN AUTO: 32.2 PG (ref 27–33)
MCHC RBC AUTO-ENTMCNC: 33.2 G/DL (ref 32–36)
MCV RBC AUTO: 97.1 FL (ref 80–100)
MONOCYTES # BLD AUTO: 556 CELLS/UL (ref 200–950)
MONOCYTES NFR BLD AUTO: 10.1 %
NEUTROPHILS # BLD AUTO: 2354 CELLS/UL (ref 1500–7800)
NEUTROPHILS NFR BLD AUTO: 42.8 %
NITRITE UR QL STRIP: NEGATIVE
NONHDLC SERPL-MCNC: 115 MG/DL (CALC)
PH UR STRIP: 7 [PH] (ref 5–8)
PLATELET # BLD AUTO: 238 THOUSAND/UL (ref 140–400)
PMV BLD REES-ECKER: 10 FL (ref 7.5–12.5)
POTASSIUM SERPL-SCNC: 4.3 MMOL/L (ref 3.5–5.3)
PROT SERPL-MCNC: 6.6 G/DL (ref 6.1–8.1)
PROT UR QL STRIP: NEGATIVE
RBC # BLD AUTO: 3.79 MILLION/UL (ref 3.8–5.1)
RBC #/AREA URNS HPF: ABNORMAL /HPF
SL AMB EGFR AFRICAN AMERICAN: 65 ML/MIN/1.73M2
SL AMB EGFR NON AFRICAN AMERICAN: 56 ML/MIN/1.73M2
SODIUM SERPL-SCNC: 139 MMOL/L (ref 135–146)
SP GR UR STRIP: 1.01 (ref 1–1.03)
SQUAMOUS #/AREA URNS HPF: ABNORMAL /HPF
T4 SERPL-MCNC: 6.2 MCG/DL (ref 4.8–10.4)
TRIGL SERPL-MCNC: 98 MG/DL
TSH SERPL-ACNC: 4.08 MIU/L (ref 0.4–4.5)
URATE SERPL-MCNC: 4.2 MG/DL (ref 2.5–7)
WBC # BLD AUTO: 5.5 THOUSAND/UL (ref 3.8–10.8)
WBC #/AREA URNS HPF: ABNORMAL /HPF

## 2020-05-26 DIAGNOSIS — I10 ESSENTIAL HYPERTENSION: Primary | ICD-10-CM

## 2020-05-26 RX ORDER — BENAZEPRIL HYDROCHLORIDE 40 MG/1
80 TABLET, FILM COATED ORAL
Qty: 180 TABLET | Refills: 1 | Status: SHIPPED | OUTPATIENT
Start: 2020-05-26 | End: 2020-11-19 | Stop reason: SDUPTHER

## 2020-06-11 ENCOUNTER — OFFICE VISIT (OUTPATIENT)
Dept: FAMILY MEDICINE CLINIC | Facility: CLINIC | Age: 75
End: 2020-06-11
Payer: COMMERCIAL

## 2020-06-11 VITALS
HEIGHT: 62 IN | BODY MASS INDEX: 23.52 KG/M2 | HEART RATE: 84 BPM | DIASTOLIC BLOOD PRESSURE: 60 MMHG | TEMPERATURE: 97.4 F | SYSTOLIC BLOOD PRESSURE: 112 MMHG | OXYGEN SATURATION: 96 % | WEIGHT: 127.8 LBS | RESPIRATION RATE: 18 BRPM

## 2020-06-11 DIAGNOSIS — I73.9 PAD (PERIPHERAL ARTERY DISEASE) (HCC): ICD-10-CM

## 2020-06-11 DIAGNOSIS — I10 ESSENTIAL HYPERTENSION: Primary | ICD-10-CM

## 2020-06-11 DIAGNOSIS — E78.2 MIXED HYPERLIPIDEMIA: ICD-10-CM

## 2020-06-11 DIAGNOSIS — F41.9 ANXIETY DISORDER, UNSPECIFIED TYPE: ICD-10-CM

## 2020-06-11 PROCEDURE — 3074F SYST BP LT 130 MM HG: CPT | Performed by: FAMILY MEDICINE

## 2020-06-11 PROCEDURE — 3008F BODY MASS INDEX DOCD: CPT | Performed by: FAMILY MEDICINE

## 2020-06-11 PROCEDURE — 1101F PT FALLS ASSESS-DOCD LE1/YR: CPT | Performed by: FAMILY MEDICINE

## 2020-06-11 PROCEDURE — 99213 OFFICE O/P EST LOW 20 MIN: CPT | Performed by: FAMILY MEDICINE

## 2020-06-11 PROCEDURE — 1036F TOBACCO NON-USER: CPT | Performed by: FAMILY MEDICINE

## 2020-06-11 PROCEDURE — 3288F FALL RISK ASSESSMENT DOCD: CPT | Performed by: FAMILY MEDICINE

## 2020-06-11 PROCEDURE — 3078F DIAST BP <80 MM HG: CPT | Performed by: FAMILY MEDICINE

## 2020-06-11 PROCEDURE — 4040F PNEUMOC VAC/ADMIN/RCVD: CPT | Performed by: FAMILY MEDICINE

## 2020-06-11 PROCEDURE — 1160F RVW MEDS BY RX/DR IN RCRD: CPT | Performed by: FAMILY MEDICINE

## 2020-07-09 ENCOUNTER — HOSPITAL ENCOUNTER (OUTPATIENT)
Dept: NON INVASIVE DIAGNOSTICS | Facility: CLINIC | Age: 75
Discharge: HOME | End: 2020-07-09
Payer: COMMERCIAL

## 2020-07-09 ENCOUNTER — TRANSCRIBE ORDERS (OUTPATIENT)
Dept: LAB | Facility: CLINIC | Age: 75
End: 2020-07-09

## 2020-07-09 DIAGNOSIS — I73.9 PAD (PERIPHERAL ARTERY DISEASE) (HCC): ICD-10-CM

## 2020-07-09 PROCEDURE — 93923 UPR/LXTR ART STDY 3+ LVLS: CPT

## 2020-07-09 PROCEDURE — 93923 UPR/LXTR ART STDY 3+ LVLS: CPT | Performed by: SURGERY

## 2020-07-21 ENCOUNTER — ANNUAL EXAM (OUTPATIENT)
Dept: OBGYN CLINIC | Facility: CLINIC | Age: 75
End: 2020-07-21
Payer: COMMERCIAL

## 2020-07-21 VITALS
DIASTOLIC BLOOD PRESSURE: 78 MMHG | SYSTOLIC BLOOD PRESSURE: 138 MMHG | HEIGHT: 62 IN | TEMPERATURE: 97.7 F | WEIGHT: 126 LBS | BODY MASS INDEX: 23.19 KG/M2

## 2020-07-21 DIAGNOSIS — M81.0 OSTEOPOROSIS, POSTMENOPAUSAL: ICD-10-CM

## 2020-07-21 DIAGNOSIS — Z01.419 WOMEN'S ANNUAL ROUTINE GYNECOLOGICAL EXAMINATION: Primary | ICD-10-CM

## 2020-07-21 DIAGNOSIS — Z12.31 ENCOUNTER FOR SCREENING MAMMOGRAM FOR BREAST CANCER: ICD-10-CM

## 2020-07-21 DIAGNOSIS — B37.9 CANDIDA INFECTION: ICD-10-CM

## 2020-07-21 DIAGNOSIS — Z12.4 SCREENING FOR MALIGNANT NEOPLASM OF CERVIX: ICD-10-CM

## 2020-07-21 PROCEDURE — 3078F DIAST BP <80 MM HG: CPT | Performed by: OBSTETRICS & GYNECOLOGY

## 2020-07-21 PROCEDURE — 4040F PNEUMOC VAC/ADMIN/RCVD: CPT | Performed by: OBSTETRICS & GYNECOLOGY

## 2020-07-21 PROCEDURE — 1160F RVW MEDS BY RX/DR IN RCRD: CPT | Performed by: OBSTETRICS & GYNECOLOGY

## 2020-07-21 PROCEDURE — G0143 SCR C/V CYTO,THINLAYER,RESCR: HCPCS | Performed by: OBSTETRICS & GYNECOLOGY

## 2020-07-21 PROCEDURE — 3075F SYST BP GE 130 - 139MM HG: CPT | Performed by: OBSTETRICS & GYNECOLOGY

## 2020-07-21 PROCEDURE — 3008F BODY MASS INDEX DOCD: CPT | Performed by: OBSTETRICS & GYNECOLOGY

## 2020-07-21 PROCEDURE — S0610 ANNUAL GYNECOLOGICAL EXAMINA: HCPCS | Performed by: OBSTETRICS & GYNECOLOGY

## 2020-07-21 RX ORDER — NYSTATIN 100000 U/G
CREAM TOPICAL 2 TIMES DAILY
Qty: 30 G | Refills: 0 | Status: SHIPPED | OUTPATIENT
Start: 2020-07-21 | End: 2021-09-13 | Stop reason: CLARIF

## 2020-07-21 NOTE — PROGRESS NOTES
Hung Hill is a 69 y o  female who presents for annual well woman exam   3   Patient has no complaint    Menstrual History:  OB History        3    Para   3    Term   3            AB        Living   3       SAB        TAB        Ectopic        Multiple        Live Births                      No LMP recorded  Patient is postmenopausal        The following portions of the patient's history were reviewed and updated as appropriate: allergies, current medications, past family history, past medical history, past social history, past surgical history and problem list     Review of Systems  Review of Systems   Constitutional: Negative for activity change, appetite change, chills, fatigue and fever  Respiratory: Negative for cough and shortness of breath  Cardiovascular: Negative for chest pain, palpitations and leg swelling  Gastrointestinal: Negative for abdominal pain, constipation, diarrhea, nausea and vomiting  Genitourinary: Negative for difficulty urinating, dysuria, flank pain, frequency, hematuria, urgency and vaginal discharge  Neurological: Negative for dizziness and headaches  Psychiatric/Behavioral: Negative for confusion  Objective      /78   Temp 97 7 °F (36 5 °C) (Tympanic)   Ht 5' 2" (1 575 m)   Wt 57 2 kg (126 lb)   BMI 23 05 kg/m²     Physical Exam  OBGyn Exam     General:   alert and oriented, in no acute distress, alert, appears stated age and cooperative   Heart: regular rate and rhythm, S1, S2 normal, no murmur, click, rub or gallop   Lungs: clear to auscultation bilaterally   Abdomen: soft, non-tender, without masses or organomegaly   Vulva: Vulvar atrophy   Vagina: Vaginal atrophy   Cervix: no cervical motion tenderness and no lesions   Uterus: normal size, non-tender   Adnexa:  Breast Exam:  normal adnexa  breasts appear normal, no suspicious masses, no skin or nipple changes or axillary nodes  Assessment      @well woman@   44-year-old female  Annual exam  Family history of breast cancer sister to have BRCA test done  Chronic hypertension  Anxiety stable  Plan   Pap/HPV if negative normal Pap needed  Diet/exercise  Calcium/vitamin-D  Colonoscopy  Mammogram  DEXA scan  To return to office for annual exam   All questions answered  There are no Patient Instructions on file for this visit

## 2020-07-27 ENCOUNTER — OFFICE VISIT (OUTPATIENT)
Dept: VASCULAR SURGERY | Facility: CLINIC | Age: 75
End: 2020-07-27
Payer: COMMERCIAL

## 2020-07-27 VITALS
TEMPERATURE: 97.6 F | WEIGHT: 126.5 LBS | HEIGHT: 62 IN | HEART RATE: 73 BPM | BODY MASS INDEX: 23.28 KG/M2 | DIASTOLIC BLOOD PRESSURE: 64 MMHG | SYSTOLIC BLOOD PRESSURE: 124 MMHG

## 2020-07-27 DIAGNOSIS — E78.2 MIXED HYPERLIPIDEMIA: ICD-10-CM

## 2020-07-27 DIAGNOSIS — I73.9 PAD (PERIPHERAL ARTERY DISEASE) (HCC): Primary | ICD-10-CM

## 2020-07-27 DIAGNOSIS — I10 ESSENTIAL HYPERTENSION: ICD-10-CM

## 2020-07-27 PROCEDURE — 4040F PNEUMOC VAC/ADMIN/RCVD: CPT | Performed by: PHYSICIAN ASSISTANT

## 2020-07-27 PROCEDURE — 3078F DIAST BP <80 MM HG: CPT | Performed by: PHYSICIAN ASSISTANT

## 2020-07-27 PROCEDURE — 1036F TOBACCO NON-USER: CPT | Performed by: PHYSICIAN ASSISTANT

## 2020-07-27 PROCEDURE — 1160F RVW MEDS BY RX/DR IN RCRD: CPT | Performed by: PHYSICIAN ASSISTANT

## 2020-07-27 PROCEDURE — 3074F SYST BP LT 130 MM HG: CPT | Performed by: PHYSICIAN ASSISTANT

## 2020-07-27 PROCEDURE — 3008F BODY MASS INDEX DOCD: CPT | Performed by: PHYSICIAN ASSISTANT

## 2020-07-27 PROCEDURE — 99214 OFFICE O/P EST MOD 30 MIN: CPT | Performed by: PHYSICIAN ASSISTANT

## 2020-07-27 NOTE — PROGRESS NOTES
Assessment/Plan:    PAD (peripheral artery disease) [facility])  72-year-old female nonsmoker with HTN and HLD who returns the office for review of recent surveillance SHADI  Patient referred 1 year ago to rule out PAD although patient asymptomatic, noninvasive imaging and ABIs/MTP/GTP normal and readily palpable femoral and distal pulses bilaterally  Patient remains asymptomatic without claudication, rest pain or tissue loss  Recent ABIs 1 2 with MTP/GTP well above healing levels and triphasic arterial waveforms at the ankle   -no further imaging or workup required  -return to office p r n   -continue ASA  -instructed patient to contact the office if she would develop new symptoms of claudication, rest pain or tissue loss  Essential hypertension  -BP well controlled  -continue current medical regimen  -management per PCP and Cardiology    Hyperlipidemia  -stable  -continue Zetia  -management per PCP and Cardiology       Diagnoses and all orders for this visit:    PAD (peripheral artery disease) ([facility] [address] )    Essential hypertension    Mixed hyperlipidemia          Subjective:      Patient ID: Valerio Morales is a 69 y o  female  Patient in office today to review recent vascular studies completed 7/9/2020     72-year-old female nonsmoker with HTN and HLD who returns to the office for review of recent surveillance SHADI  Patient referred 1 year ago to rule out PAD although patient asymptomatic, noninvasive imaging and ABIs/MTP/GTP normal and readily palpable femoral and distal pulses bilaterally  Patient remains asymptomatic without claudication, rest pain or tissue loss  Recent ABIs 1 2 bilaterally with MTP/GTP well above healing levels and triphasic arterial waveforms at the ankle     Palpable pulses as noted  Patient with no prior history arterial occlusive disease or vascular intervention  On aspirin and Zetia        The following portions of the patient's history were reviewed and updated as appropriate: allergies, current medications, past family history, past medical history, past social history, past surgical history and problem list     Review of Systems   Constitutional: Negative  HENT: Negative  Eyes: Negative  Respiratory: Negative  Cardiovascular: Negative  Gastrointestinal: Negative  Endocrine: Negative  Genitourinary: Negative  Musculoskeletal: Negative  Skin: Negative  Allergic/Immunologic: Negative  Neurological: Negative  Hematological: Negative  Psychiatric/Behavioral: Negative  I have reviewed and made appropriate changes to the review of systems input by the medical assistant  Vitals:    07/27/20 1044   BP: 124/64   BP Location: Left arm   Patient Position: Sitting   Cuff Size: Standard   Pulse: 73   Temp: 97 6 °F (36 4 °C)   Weight: 57 4 kg (126 lb 8 oz)   Height: 5' 2" (1 575 m)       Patient Active Problem List   Diagnosis    Other chest pain    Essential hypertension    PAD (peripheral artery disease) ([facility])    Gross hematuria    Anxiety disorder    Hyperlipidemia    Hypothyroidism    Retroperitoneal mass    History of colon polyps       Past Surgical History:   Procedure Laterality Date    COLONOSCOPY W/ POLYPECTOMY      MD EDG US EXAM SURGICAL ALTER STOM DUODENUM/JEJUNUM N/A 3/14/2019    Procedure: LINEAR ENDOSCOPIC U/S;  Surgeon: Angeles Theodore MD;  Location: [facility];   Service: Gastroenterology       Family History   Problem Relation Age of Onset    Hyperlipidemia Mother     Heart attack Father     Other Sister         open heart surgery     Heart murmur Sister     Stroke Sister     Hyperlipidemia Sister         all siblings     Breast cancer Sister 79    Heart attack Brother         open heart surgery     Deep vein thrombosis Brother     Hyperlipidemia Brother     Coronary artery disease Brother         stents placed     Heart attack Paternal Aunt     Heart attack Paternal Uncle     Hypertension Family         all in the family both sides    Anuerysm Neg Hx     Heart failure Neg Hx        Social History     Socioeconomic History    Marital status: /Civil Union     Spouse name: Not on file    Number of children: Not on file    Years of education: Not on file    Highest education level:  Bachelor's degree (e g , BA, AB, BS)   Occupational History    Occupation: retired   Social Needs    Financial resource strain: Not on file    Food insecurity:     Worry: Not on file     Inability: Not on file   [email or web address] needs:     Medical: Not on file     Non-medical: Not on file   Tobacco Use    Smoking status: Never Smoker    Smokeless tobacco: Never Used   Substance and Sexual Activity    Alcohol use: Yes     Frequency: Monthly or less     Binge frequency: Never     Comment: social - rare     Drug use: No    Sexual activity: Yes     Partners: Male   Lifestyle    Physical activity:     Days per week: Not on file     Minutes per session: Not on file    Stress: Not on file   Relationships    Social connections:     Talks on phone: Not on file     Gets together: Not on file     Attends Islam service: Not on file     Active member of club or organization: Not on file     Attends meetings of clubs or organizations: Not on file     Relationship status: Not on file    Intimate partner violence:     Fear of current or ex partner: Not on file     Emotionally abused: Not on file     Physically abused: Not on file     Forced sexual activity: Not on file   Other Topics Concern    Not on file   Social History Narrative    Most recent tobacco use screenin-      Do you currently or have you served in [employer]:   No      Were you activated, into active duty, as a member of the [employer] or as a Reservist:   No      Occupation:   homemaker      Marital status:         Sexual orientation:   Heterosexual      Exercise level:   Occasional      Diet:   Regular      Alcohol intake:   Occasional      Caffeine intake:   Occasional      Chewing tobacco:   none      Illicit drugs:   denies      Guns present in home:   No      Seat belts used routinely:   Yes      Sunscreen used routinely:   No      stays out of sun; allergic to sunscreen    Live alone or with others:   with others      Smoke alarm in home:   Yes      Advance directive:   No      Has the Patient had a mammogram to screen for breast cancer within 24 months:   Yes      Please enter the date of the Patient's previous mammogram :   08-      Sexually active:   Yes        Allergies   Allergen Reactions    Imdur [Isosorbide Nitrate] Headache    Sulfa Antibiotics Hives    Ciprofloxacin Rash and GI Intolerance    Macrobid [Nitrofurantoin] Other (See Comments)     Pt does not recall reaction         Current Outpatient Medications:     Aspirin (ASPIR-81 PO), Take 81 mg by mouth daily, Disp: , Rfl:     benazepril (LOTENSIN) 40 MG tablet, Take 2 tablets (80 mg total) by mouth daily in the early morning, Disp: 180 tablet, Rfl: 1    Coenzyme Q10 (COQ-10) 100 MG CAPS, Take 1 capsule by mouth daily, Disp: , Rfl:     ezetimibe (ZETIA) 10 mg tablet, Take 1 tablet (10 mg total) by mouth daily, Disp: 90 tablet, Rfl: 1    Magnesium Oxide -Mg Supplement 400 MG CAPS, Take 1 capsule by mouth daily, Disp: , Rfl:     metoprolol succinate (TOPROL-XL) 25 mg 24 hr tablet, Take 1 tablet (25 mg total) by mouth daily at bedtime, Disp: , Rfl:     niacin 500 mg tablet, Take 500 mg by mouth daily with breakfast, Disp: , Rfl:     nystatin (MYCOSTATIN) cream, Apply topically 2 (two) times a day, Disp: 30 g, Rfl: 0    Omega 3 1200 MG CAPS, Take 1 capsule by mouth daily, Disp: , Rfl:     Probiotic Product (PROBIOTIC-10) CAPS, Take by mouth daily, Disp: , Rfl:     Red Yeast Rice 600 MG CAPS, Take 2 capsules by mouth daily, Disp: , Rfl:     Biotin 10 MG CAPS, Take 1 capsule by mouth daily 5000 mcg daily , Disp: , Rfl:     Cholecalciferol (VITAMIN D3) 1000 units CAPS, Take 1 capsule by mouth daily, Disp: , Rfl:     cyanocobalamin (VITAMIN B-12) 1,000 mcg tablet, Take 1,000 mcg by mouth daily, Disp: , Rfl:     cyclobenzaprine (FLEXERIL) 10 mg tablet, Take 1 tablet (10 mg total) by mouth 3 (three) times a day as needed for muscle spasms (Patient not taking: Reported on 6/11/2020), Disp: 30 tablet, Rfl: 0    escitalopram (LEXAPRO) 10 mg tablet, Take 1 tablet by mouth daily at bedtime, Disp: , Rfl:     Lutein 6 MG TABS, Take 20 mg by mouth daily, Disp: , Rfl:     Turmeric 500 MG CAPS, Take 500 mg by mouth daily, Disp: , Rfl:      Objective:  Imaging study:  SHADI 7/9/2020:  R SHADI 1 26/157/95, L SHADI 1 21/127/95 with triphasic arterial waveforms at ankle    /64 (BP Location: Left arm, Patient Position: Sitting, Cuff Size: Standard)   Pulse 73   Temp 97 6 °F (36 4 °C)   Ht 5' 2" (1 575 m)   Wt 57 4 kg (126 lb 8 oz)   BMI 23 14 kg/m²          Physical Exam   Constitutional: She is oriented to person, place, and time  She appears well-developed and well-nourished  No distress  HENT:   Head: Normocephalic and atraumatic  Eyes: Pupils are equal, round, and reactive to light  Conjunctivae and EOM are normal  No scleral icterus  Neck: Normal range of motion  Neck supple  No JVD present  Carotid bruit is not present  No tracheal deviation present  No thyromegaly present  Cardiovascular: Normal rate, regular rhythm, S1 normal and S2 normal  Exam reveals no gallop, no S3 and no friction rub  No murmur heard  Pulses:       Carotid pulses are 2+ on the right side, and 2+ on the left side  Radial pulses are 2+ on the right side, and 2+ on the left side  Femoral pulses are 2+ on the right side, and 2+ on the left side  Popliteal pulses are 2+ on the right side, and 2+ on the left side  Dorsalis pedis pulses are 2+ on the right side, and 2+ on the left side  Posterior tibial pulses are 2+ on the right side, and 2+ on the left side     Bilateral lower extremities warm, pink, motor and sensory intact and well perfused without cyanosis, pallor, rubor, tissue loss or trash foot  Readily palpable pulses throughout   Pulmonary/Chest: Breath sounds normal  No stridor  No respiratory distress  She has no wheezes  She has no rhonchi  She has no rales  Abdominal: Soft  Bowel sounds are normal  She exhibits no distension, no abdominal bruit, no pulsatile midline mass and no mass  There is no hepatosplenomegaly  There is no tenderness  There is no rebound  Musculoskeletal: Normal range of motion  She exhibits no edema or deformity  Neurological: She is alert and oriented to person, place, and time  She has normal strength  Skin: Skin is warm, dry and intact  No lesion noted  No cyanosis or erythema  No pallor  Psychiatric: She has a normal mood and affect  Nursing note and vitals reviewed

## 2020-07-27 NOTE — ASSESSMENT & PLAN NOTE
73-year-old female nonsmoker with HTN and HLD who returns the office for review of recent surveillance SHADI  Patient referred 1 year ago to rule out PAD although patient asymptomatic, noninvasive imaging and ABIs/MTP/GTP normal and readily palpable femoral and distal pulses bilaterally  Patient remains asymptomatic without claudication, rest pain or tissue loss  Recent ABIs 1 2 with MTP/GTP well above healing levels and triphasic arterial waveforms at the ankle   -no further imaging or workup required  -return to office p r n   -continue ASA  -instructed patient to contact the office if she would develop new symptoms of claudication, rest pain or tissue loss

## 2020-07-29 DIAGNOSIS — I10 ESSENTIAL HYPERTENSION: ICD-10-CM

## 2020-07-29 LAB
LAB AP GYN PRIMARY INTERPRETATION: NORMAL
Lab: NORMAL

## 2020-07-29 RX ORDER — METOPROLOL SUCCINATE 25 MG/1
25 TABLET, EXTENDED RELEASE ORAL
Qty: 90 TABLET | Refills: 0 | Status: SHIPPED | OUTPATIENT
Start: 2020-07-29 | End: 2020-10-28 | Stop reason: SDUPTHER

## 2020-10-12 ENCOUNTER — OFFICE VISIT (OUTPATIENT)
Dept: OBGYN CLINIC | Facility: CLINIC | Age: 75
End: 2020-10-12
Payer: COMMERCIAL

## 2020-10-12 VITALS
DIASTOLIC BLOOD PRESSURE: 80 MMHG | SYSTOLIC BLOOD PRESSURE: 142 MMHG | WEIGHT: 128 LBS | HEIGHT: 62 IN | TEMPERATURE: 97.6 F | BODY MASS INDEX: 23.55 KG/M2

## 2020-10-12 DIAGNOSIS — N64.4 MASTALGIA: ICD-10-CM

## 2020-10-12 DIAGNOSIS — R87.615 UNSATISFACTORY CYTOLOGY OF CERVICAL PAPANICOLAOU SMEAR: Primary | ICD-10-CM

## 2020-10-12 PROCEDURE — 99213 OFFICE O/P EST LOW 20 MIN: CPT | Performed by: OBSTETRICS & GYNECOLOGY

## 2020-10-12 PROCEDURE — 1160F RVW MEDS BY RX/DR IN RCRD: CPT | Performed by: OBSTETRICS & GYNECOLOGY

## 2020-10-12 PROCEDURE — 3077F SYST BP >= 140 MM HG: CPT | Performed by: OBSTETRICS & GYNECOLOGY

## 2020-10-12 PROCEDURE — G0145 SCR C/V CYTO,THINLAYER,RESCR: HCPCS | Performed by: OBSTETRICS & GYNECOLOGY

## 2020-10-12 PROCEDURE — 3079F DIAST BP 80-89 MM HG: CPT | Performed by: OBSTETRICS & GYNECOLOGY

## 2020-10-12 PROCEDURE — 1036F TOBACCO NON-USER: CPT | Performed by: OBSTETRICS & GYNECOLOGY

## 2020-10-19 ENCOUNTER — HOSPITAL ENCOUNTER (OUTPATIENT)
Dept: RADIOLOGY | Facility: MEDICAL CENTER | Age: 75
Discharge: HOME | End: 2020-10-19
Payer: COMMERCIAL

## 2020-10-19 VITALS — WEIGHT: 128 LBS | BODY MASS INDEX: 23.55 KG/M2 | HEIGHT: 62 IN

## 2020-10-19 DIAGNOSIS — M81.0 OSTEOPOROSIS, POSTMENOPAUSAL: ICD-10-CM

## 2020-10-19 DIAGNOSIS — Z13.820 SCREENING FOR OSTEOPOROSIS: ICD-10-CM

## 2020-10-19 DIAGNOSIS — Z12.31 ENCOUNTER FOR SCREENING MAMMOGRAM FOR BREAST CANCER: ICD-10-CM

## 2020-10-19 LAB
LAB AP GYN PRIMARY INTERPRETATION: NORMAL
Lab: NORMAL

## 2020-10-19 PROCEDURE — 77080 DXA BONE DENSITY AXIAL: CPT

## 2020-10-19 PROCEDURE — 77067 SCR MAMMO BI INCL CAD: CPT

## 2020-10-19 PROCEDURE — 77063 BREAST TOMOSYNTHESIS BI: CPT

## 2020-10-28 DIAGNOSIS — I10 ESSENTIAL HYPERTENSION: ICD-10-CM

## 2020-10-28 RX ORDER — METOPROLOL SUCCINATE 25 MG/1
25 TABLET, EXTENDED RELEASE ORAL
Qty: 90 TABLET | Refills: 0 | Status: SHIPPED | OUTPATIENT
Start: 2020-10-28 | End: 2021-01-29

## 2020-11-11 DIAGNOSIS — E78.5 HYPERLIPIDEMIA, UNSPECIFIED HYPERLIPIDEMIA TYPE: ICD-10-CM

## 2020-11-11 RX ORDER — EZETIMIBE 10 MG/1
10 TABLET ORAL DAILY
Qty: 90 TABLET | Refills: 1 | Status: SHIPPED | OUTPATIENT
Start: 2020-11-11 | End: 2021-05-11 | Stop reason: SDUPTHER

## 2020-11-18 ENCOUNTER — OFFICE VISIT (OUTPATIENT)
Dept: CARDIOLOGY CLINIC | Facility: MEDICAL CENTER | Age: 75
End: 2020-11-18
Payer: COMMERCIAL

## 2020-11-18 VITALS
BODY MASS INDEX: 23.34 KG/M2 | HEIGHT: 62 IN | OXYGEN SATURATION: 93 % | WEIGHT: 126.8 LBS | SYSTOLIC BLOOD PRESSURE: 124 MMHG | DIASTOLIC BLOOD PRESSURE: 68 MMHG | HEART RATE: 72 BPM

## 2020-11-18 DIAGNOSIS — I10 ESSENTIAL HYPERTENSION: Primary | ICD-10-CM

## 2020-11-18 DIAGNOSIS — E78.2 MIXED HYPERLIPIDEMIA: ICD-10-CM

## 2020-11-18 PROCEDURE — 93000 ELECTROCARDIOGRAM COMPLETE: CPT | Performed by: INTERNAL MEDICINE

## 2020-11-18 PROCEDURE — 99214 OFFICE O/P EST MOD 30 MIN: CPT | Performed by: INTERNAL MEDICINE

## 2020-11-18 PROCEDURE — 1036F TOBACCO NON-USER: CPT | Performed by: INTERNAL MEDICINE

## 2020-11-18 PROCEDURE — 1160F RVW MEDS BY RX/DR IN RCRD: CPT | Performed by: INTERNAL MEDICINE

## 2020-11-18 RX ORDER — MULTIVITAMIN
1 CAPSULE ORAL DAILY
COMMUNITY

## 2020-11-19 ENCOUNTER — TELEPHONE (OUTPATIENT)
Dept: FAMILY MEDICINE CLINIC | Facility: CLINIC | Age: 75
End: 2020-11-19

## 2020-11-19 DIAGNOSIS — I10 ESSENTIAL HYPERTENSION: ICD-10-CM

## 2020-11-19 RX ORDER — BENAZEPRIL HYDROCHLORIDE 40 MG/1
80 TABLET, FILM COATED ORAL
Qty: 180 TABLET | Refills: 1 | Status: SHIPPED | OUTPATIENT
Start: 2020-11-19 | End: 2021-05-18 | Stop reason: SDUPTHER

## 2020-11-23 ENCOUNTER — OFFICE VISIT (OUTPATIENT)
Dept: OBGYN CLINIC | Facility: CLINIC | Age: 75
End: 2020-11-23
Payer: COMMERCIAL

## 2020-11-23 VITALS
BODY MASS INDEX: 23.19 KG/M2 | DIASTOLIC BLOOD PRESSURE: 78 MMHG | HEIGHT: 62 IN | SYSTOLIC BLOOD PRESSURE: 142 MMHG | TEMPERATURE: 97.8 F | WEIGHT: 126 LBS

## 2020-11-23 DIAGNOSIS — M81.0 AGE RELATED OSTEOPOROSIS, UNSPECIFIED PATHOLOGICAL FRACTURE PRESENCE: Primary | ICD-10-CM

## 2020-11-23 PROCEDURE — 1036F TOBACCO NON-USER: CPT | Performed by: OBSTETRICS & GYNECOLOGY

## 2020-11-23 PROCEDURE — 3008F BODY MASS INDEX DOCD: CPT | Performed by: INTERNAL MEDICINE

## 2020-11-23 PROCEDURE — 1160F RVW MEDS BY RX/DR IN RCRD: CPT | Performed by: OBSTETRICS & GYNECOLOGY

## 2020-11-23 PROCEDURE — 99213 OFFICE O/P EST LOW 20 MIN: CPT | Performed by: OBSTETRICS & GYNECOLOGY

## 2020-11-25 ENCOUNTER — TELEPHONE (OUTPATIENT)
Dept: OBGYN CLINIC | Facility: CLINIC | Age: 75
End: 2020-11-25

## 2020-12-03 ENCOUNTER — TELEPHONE (OUTPATIENT)
Dept: CARDIOLOGY CLINIC | Facility: MEDICAL CENTER | Age: 75
End: 2020-12-03

## 2020-12-07 LAB
ALBUMIN SERPL-MCNC: 3.9 G/DL (ref 3.6–5.1)
ALBUMIN/GLOB SERPL: 1.4 (CALC) (ref 1–2.5)
ALP SERPL-CCNC: 62 U/L (ref 37–153)
ALT SERPL-CCNC: 12 U/L (ref 6–29)
AST SERPL-CCNC: 17 U/L (ref 10–35)
BILIRUB DIRECT SERPL-MCNC: 0.1 MG/DL
BILIRUB INDIRECT SERPL-MCNC: 0.4 MG/DL (CALC) (ref 0.2–1.2)
BILIRUB SERPL-MCNC: 0.5 MG/DL (ref 0.2–1.2)
CHOLEST SERPL-MCNC: 157 MG/DL
CHOLEST/HDLC SERPL: 4.1 (CALC)
GLOBULIN SER CALC-MCNC: 2.8 G/DL (CALC) (ref 1.9–3.7)
HDLC SERPL-MCNC: 38 MG/DL
LDLC SERPL CALC-MCNC: 94 MG/DL (CALC)
NONHDLC SERPL-MCNC: 119 MG/DL (CALC)
PROT SERPL-MCNC: 6.7 G/DL (ref 6.1–8.1)
TRIGL SERPL-MCNC: 155 MG/DL

## 2020-12-17 ENCOUNTER — TELEMEDICINE (OUTPATIENT)
Dept: FAMILY MEDICINE CLINIC | Facility: CLINIC | Age: 75
End: 2020-12-17
Payer: COMMERCIAL

## 2020-12-17 DIAGNOSIS — M81.0 AGE-RELATED OSTEOPOROSIS WITHOUT CURRENT PATHOLOGICAL FRACTURE: ICD-10-CM

## 2020-12-17 DIAGNOSIS — F41.9 ANXIETY DISORDER, UNSPECIFIED TYPE: ICD-10-CM

## 2020-12-17 DIAGNOSIS — E03.9 HYPOTHYROIDISM, UNSPECIFIED TYPE: Primary | ICD-10-CM

## 2020-12-17 DIAGNOSIS — E78.2 MIXED HYPERLIPIDEMIA: ICD-10-CM

## 2020-12-17 DIAGNOSIS — I10 ESSENTIAL HYPERTENSION: ICD-10-CM

## 2020-12-17 PROCEDURE — 99442 PR PHYS/QHP TELEPHONE EVALUATION 11-20 MIN: CPT | Performed by: FAMILY MEDICINE

## 2021-01-29 DIAGNOSIS — I10 ESSENTIAL HYPERTENSION: ICD-10-CM

## 2021-01-29 RX ORDER — METOPROLOL SUCCINATE 25 MG/1
TABLET, EXTENDED RELEASE ORAL
Qty: 90 TABLET | Refills: 0 | Status: SHIPPED | OUTPATIENT
Start: 2021-01-29 | End: 2021-04-30 | Stop reason: SDUPTHER

## 2021-02-13 DIAGNOSIS — Z23 ENCOUNTER FOR IMMUNIZATION: ICD-10-CM

## 2021-02-17 ENCOUNTER — IMMUNIZATIONS (OUTPATIENT)
Dept: FAMILY MEDICINE CLINIC | Facility: HOSPITAL | Age: 76
End: 2021-02-17

## 2021-02-17 DIAGNOSIS — Z23 ENCOUNTER FOR IMMUNIZATION: Primary | ICD-10-CM

## 2021-02-17 PROCEDURE — 91300 SARS-COV-2 / COVID-19 MRNA VACCINE (PFIZER-BIONTECH) 30 MCG: CPT

## 2021-02-17 PROCEDURE — 0001A SARS-COV-2 / COVID-19 MRNA VACCINE (PFIZER-BIONTECH) 30 MCG: CPT

## 2021-03-09 ENCOUNTER — IMMUNIZATIONS (OUTPATIENT)
Dept: FAMILY MEDICINE CLINIC | Facility: HOSPITAL | Age: 76
End: 2021-03-09

## 2021-03-09 DIAGNOSIS — Z23 ENCOUNTER FOR IMMUNIZATION: Primary | ICD-10-CM

## 2021-03-09 PROCEDURE — 0002A SARS-COV-2 / COVID-19 MRNA VACCINE (PFIZER-BIONTECH) 30 MCG: CPT | Performed by: INTERNAL MEDICINE

## 2021-03-09 PROCEDURE — 91300 SARS-COV-2 / COVID-19 MRNA VACCINE (PFIZER-BIONTECH) 30 MCG: CPT | Performed by: INTERNAL MEDICINE

## 2021-04-30 DIAGNOSIS — I10 ESSENTIAL HYPERTENSION: ICD-10-CM

## 2021-05-01 RX ORDER — METOPROLOL SUCCINATE 25 MG/1
25 TABLET, EXTENDED RELEASE ORAL
Qty: 90 TABLET | Refills: 1 | Status: SHIPPED | OUTPATIENT
Start: 2021-05-01 | End: 2021-10-28 | Stop reason: SDUPTHER

## 2021-05-11 DIAGNOSIS — E78.5 HYPERLIPIDEMIA, UNSPECIFIED HYPERLIPIDEMIA TYPE: ICD-10-CM

## 2021-05-11 RX ORDER — EZETIMIBE 10 MG/1
10 TABLET ORAL DAILY
Qty: 90 TABLET | Refills: 1 | Status: SHIPPED | OUTPATIENT
Start: 2021-05-11 | End: 2021-11-01 | Stop reason: SDUPTHER

## 2021-05-18 DIAGNOSIS — F32.5 MAJOR DEPRESSIVE DISORDER WITH SINGLE EPISODE, IN REMISSION (HCC): Primary | ICD-10-CM

## 2021-05-18 DIAGNOSIS — I10 ESSENTIAL HYPERTENSION: ICD-10-CM

## 2021-05-18 RX ORDER — BENAZEPRIL HYDROCHLORIDE 40 MG/1
80 TABLET, FILM COATED ORAL
Qty: 180 TABLET | Refills: 1 | Status: SHIPPED | OUTPATIENT
Start: 2021-05-18 | End: 2021-11-15 | Stop reason: SDUPTHER

## 2021-05-18 RX ORDER — ESCITALOPRAM OXALATE 10 MG/1
10 TABLET ORAL
Qty: 90 TABLET | Refills: 1 | Status: SHIPPED | OUTPATIENT
Start: 2021-05-18 | End: 2022-01-03 | Stop reason: SDUPTHER

## 2021-06-04 ENCOUNTER — RA CDI HCC (OUTPATIENT)
Dept: OTHER | Facility: HOSPITAL | Age: 76
End: 2021-06-04

## 2021-06-04 LAB
ALBUMIN SERPL-MCNC: 3.8 G/DL (ref 3.6–5.1)
ALBUMIN/GLOB SERPL: 1.4 (CALC) (ref 1–2.5)
ALP SERPL-CCNC: 57 U/L (ref 37–153)
ALT SERPL-CCNC: 18 U/L (ref 6–29)
AST SERPL-CCNC: 20 U/L (ref 10–35)
BASOPHILS # BLD AUTO: 41 CELLS/UL (ref 0–200)
BASOPHILS NFR BLD AUTO: 0.7 %
BILIRUB SERPL-MCNC: 0.6 MG/DL (ref 0.2–1.2)
BUN SERPL-MCNC: 17 MG/DL (ref 7–25)
BUN/CREAT SERPL: NORMAL (CALC) (ref 6–22)
CALCIUM SERPL-MCNC: 8.9 MG/DL (ref 8.6–10.4)
CHLORIDE SERPL-SCNC: 102 MMOL/L (ref 98–110)
CHOLEST SERPL-MCNC: 175 MG/DL
CHOLEST/HDLC SERPL: 4.1 (CALC)
CO2 SERPL-SCNC: 31 MMOL/L (ref 20–32)
CREAT SERPL-MCNC: 0.92 MG/DL (ref 0.6–0.93)
EOSINOPHIL # BLD AUTO: 151 CELLS/UL (ref 15–500)
EOSINOPHIL NFR BLD AUTO: 2.6 %
ERYTHROCYTE [DISTWIDTH] IN BLOOD BY AUTOMATED COUNT: 12.6 % (ref 11–15)
GLOBULIN SER CALC-MCNC: 2.7 G/DL (CALC) (ref 1.9–3.7)
GLUCOSE SERPL-MCNC: 86 MG/DL (ref 65–99)
HCT VFR BLD AUTO: 37.9 % (ref 35–45)
HDLC SERPL-MCNC: 43 MG/DL
HGB BLD-MCNC: 12.3 G/DL (ref 11.7–15.5)
LDLC SERPL CALC-MCNC: 112 MG/DL (CALC)
LYMPHOCYTES # BLD AUTO: 2401 CELLS/UL (ref 850–3900)
LYMPHOCYTES NFR BLD AUTO: 41.4 %
MAGNESIUM SERPL-MCNC: 1.8 MG/DL (ref 1.5–2.5)
MCH RBC QN AUTO: 31.5 PG (ref 27–33)
MCHC RBC AUTO-ENTMCNC: 32.5 G/DL (ref 32–36)
MCV RBC AUTO: 97.2 FL (ref 80–100)
MONOCYTES # BLD AUTO: 667 CELLS/UL (ref 200–950)
MONOCYTES NFR BLD AUTO: 11.5 %
NEUTROPHILS # BLD AUTO: 2540 CELLS/UL (ref 1500–7800)
NEUTROPHILS NFR BLD AUTO: 43.8 %
NONHDLC SERPL-MCNC: 132 MG/DL (CALC)
PLATELET # BLD AUTO: 243 THOUSAND/UL (ref 140–400)
PMV BLD REES-ECKER: 9.6 FL (ref 7.5–12.5)
POTASSIUM SERPL-SCNC: 3.9 MMOL/L (ref 3.5–5.3)
PROT SERPL-MCNC: 6.5 G/DL (ref 6.1–8.1)
RBC # BLD AUTO: 3.9 MILLION/UL (ref 3.8–5.1)
SL AMB EGFR AFRICAN AMERICAN: 72 ML/MIN/1.73M2
SL AMB EGFR NON AFRICAN AMERICAN: 62 ML/MIN/1.73M2
SODIUM SERPL-SCNC: 137 MMOL/L (ref 135–146)
T4 FREE SERPL-MCNC: 1.1 NG/DL (ref 0.8–1.8)
TRIGL SERPL-MCNC: 101 MG/DL
TSH SERPL-ACNC: 3.05 MIU/L (ref 0.4–4.5)
URATE SERPL-MCNC: 3.6 MG/DL (ref 2.5–7)
WBC # BLD AUTO: 5.8 THOUSAND/UL (ref 3.8–10.8)

## 2021-06-04 NOTE — PROGRESS NOTES
Based on clinical documentation indicated in your record, it appears that the patient may have the following conditions not coded in 2021:    I73 9 Peripheral vascular disease    If this is correct, please document and assess at your next visit June 11th    [place] [id number]  coding opportunities             Chart reviewed, (number of) suggestions sent to provider: 1           Patients insurance company: [employer] (Medicare Advantage and Commercial)             [place] [address]  coding opportunities             Chart reviewed, (number of) suggestions sent to provider: 1     Problem listed updated   Provider Accepted, (number of) suggestions accepted: 1            Number of suggestions NOT actually used: 1     Patients insurance company: [employer] (Medicare Advantage and Commercial)     Visit status: Patient arrived for their scheduled appointment

## 2021-06-10 PROBLEM — F32.5 MAJOR DEPRESSIVE DISORDER WITH SINGLE EPISODE, IN REMISSION (HCC): Status: ACTIVE | Noted: 2021-06-10

## 2021-06-10 NOTE — ASSESSMENT & PLAN NOTE
Patient to continue utilizing medical therapy as well and counseling sources as applicable for condition  If  suicidal thought or fear of suicide to contact 911 and seek help immediately   Meds reviewed and patient questions answered today

## 2021-06-10 NOTE — PROGRESS NOTES
Depression Screening and Follow-up Plan: Patient's depression screening was positive with a PHQ-2 score of 0  Their PHQ-9 score was 2  Patient assessed for underlying major depression  Brief counseling provided and recommend additional follow-up/re-evaluation next office visit  Patient advised to follow-up with PCP for further management  Falls Plan of Care: balance, strength, and gait training instructions were provided  Home safety education provided  Assessment/Plan:         Problem List Items Addressed This Visit        Cardiovascular and Mediastinum    Essential hypertension     Patient is stable with current anti-hypertensive medicine and continue to follow a low sodium diet and take current medication  All questions about this condition were answered today  Other    Hyperlipidemia     Patient  is stable with current medication and we discussed a low fat low cholesterol diet  Weight loss also discussed for this will help lower cholesterol also  Recheck lipids in 6 months  Major depressive disorder with single episode, in remission [facility])     Patient to continue utilizing medical therapy as well and counseling sources as applicable for condition  If  suicidal thought or fear of suicide to contact 911 and seek help immediately  Meds reviewed and patient questions answered today           Other Visit Diagnoses     Well adult exam    -  Primary    Need for hepatitis C screening test        Screening for colorectal cancer        Age-related osteoporosis without current pathological fracture                Subjective:      Patient ID: Damian Davis is a 70 y o  female  This is a 82-year-old white female here today for Medicare wellness and checkup on hypertension hyperlipidemia and osteoporosis  Patient had lab work done and reviewed and is doing very well  Patient is doing fantastic with her cholesterol her kidney function liver function    Had discussion about osteoporosis with using calcium and vitamin-D and also weight-bearing exercises would be a good idea for her also  Patient has had both COVID vaccines is doing well  The following portions of the patient's history were reviewed and updated as appropriate:   Past Medical History:  She has a past medical history of Anxiety, Colon polyps, Gallbladder polyp, Kidney cysts, Kidney stones, and Lung disease ,  _______________________________________________________________________  Medical Problems:  does not have any pertinent problems on file ,  _______________________________________________________________________  Past Surgical History:   has a past surgical history that includes Colonoscopy w/ polypectomy and pr edg us exam surgical alter stom duodenum/jejunum (N/A, 3/14/2019)  ,  _______________________________________________________________________  Family History:  family history includes Breast cancer (age of onset: 79) in her sister; Coronary artery disease in her brother; Deep vein thrombosis in her brother; Heart attack in her brother, father, paternal aunt, and paternal uncle; Heart murmur in her sister; Hyperlipidemia in her brother, mother, and sister; Hypertension in her family; No Known Problems in her brother, daughter, maternal grandfather, maternal grandmother, paternal grandfather, paternal grandmother, sister, son, and son; Other in her sister; Parkinsonism in her sister; Stroke in her sister  ,  _______________________________________________________________________  Social History:   reports that she has never smoked  She has never used smokeless tobacco  She reports current alcohol use  She reports that she does not use drugs  ,  _______________________________________________________________________  Allergies:  is allergic to imdur [isosorbide nitrate]; sulfa antibiotics; ciprofloxacin; and macrobid [nitrofurantoin]     _______________________________________________________________________  Current Outpatient Medications   Medication Sig Dispense Refill    Aspirin (ASPIR-81 PO) Take 81 mg by mouth daily      benazepril (LOTENSIN) 40 MG tablet Take 2 tablets (80 mg total) by mouth daily in the early morning 180 tablet 1    calcium-vitamin D 250-100 MG-UNIT per tablet Take 1 tablet by mouth 2 (two) times a day      Coenzyme Q10 (COQ-10) 100 MG CAPS Take 1 capsule by mouth daily      escitalopram (LEXAPRO) 10 mg tablet Take 1 tablet (10 mg total) by mouth daily at bedtime 90 tablet 1    ezetimibe (ZETIA) 10 mg tablet Take 1 tablet (10 mg total) by mouth daily 90 tablet 1    Magnesium Oxide -Mg Supplement 400 MG CAPS Take 1 capsule by mouth daily      metoprolol succinate (TOPROL-XL) 25 mg 24 hr tablet Take 1 tablet (25 mg total) by mouth daily at bedtime 90 tablet 1    Multiple Vitamin (multivitamin) capsule Take 1 capsule by mouth daily      niacin 500 mg tablet Take 500 mg by mouth daily with breakfast      Omega-3 Fatty Acids (Omega-3 Fish Oil) 500 MG CAPS Take by mouth      Probiotic Product (PROBIOTIC-10) CAPS Take by mouth daily      Red Yeast Rice 600 MG CAPS Take 2 capsules by mouth daily      nystatin (MYCOSTATIN) cream Apply topically 2 (two) times a day 30 g 0     No current facility-administered medications for this visit       _______________________________________________________________________  Review of Systems   Constitutional: Negative for activity change, appetite change, fatigue and fever  HENT: Negative for congestion, ear pain, postnasal drip, rhinorrhea, sinus pressure, sinus pain, sneezing and sore throat  Eyes: Negative for pain and redness  Respiratory: Negative for apnea, cough, chest tightness, shortness of breath and wheezing  Cardiovascular: Negative for chest pain, palpitations and leg swelling  Gastrointestinal: Negative for abdominal pain, constipation, diarrhea, nausea and vomiting  Endocrine: Negative for cold intolerance and heat intolerance     Genitourinary: Negative for difficulty urinating, dysuria, frequency, hematuria and urgency  Musculoskeletal: Negative for arthralgias, back pain, gait problem and myalgias  Skin: Negative for rash  Neurological: Negative for dizziness, speech difficulty, weakness, numbness and headaches  Hematological: Does not bruise/bleed easily  Psychiatric/Behavioral: Negative for agitation, confusion and hallucinations  Objective:  Vitals:    06/11/21 0948   BP: 114/68   BP Location: Left arm   Patient Position: Sitting   Cuff Size: Standard   Pulse: 74   Resp: 16   Temp: 98 1 °F (36 7 °C)   SpO2: 98%   Weight: 57 2 kg (126 lb)   Height: 5' 2" (1 575 m)     Body mass index is 23 05 kg/m²  Physical Exam  Vitals signs and nursing note reviewed  Constitutional:       Appearance: She is well-developed  HENT:      Head: Normocephalic and atraumatic  Nose: Nose normal       Mouth/Throat:      Mouth: Mucous membranes are moist    Eyes:      General: No scleral icterus  Conjunctiva/sclera: Conjunctivae normal       Pupils: Pupils are equal, round, and reactive to light  Neck:      Musculoskeletal: Normal range of motion and neck supple  Thyroid: No thyromegaly  Cardiovascular:      Rate and Rhythm: Normal rate and regular rhythm  Pulmonary:      Effort: Pulmonary effort is normal       Breath sounds: Normal breath sounds  No wheezing  Abdominal:      General: Bowel sounds are normal  There is no distension  Palpations: Abdomen is soft  Tenderness: There is no abdominal tenderness  There is no guarding or rebound  Musculoskeletal: Normal range of motion  General: No tenderness or deformity  Skin:     General: Skin is warm and dry  Findings: No erythema or rash  Neurological:      Mental Status: She is alert and oriented to person, place, and time  Sensory: No sensory deficit     Psychiatric:         Mood and Affect: Mood normal          Behavior: Behavior normal  Thought Content:  Thought content normal          Judgment: Judgment normal

## 2021-06-11 ENCOUNTER — OFFICE VISIT (OUTPATIENT)
Dept: FAMILY MEDICINE CLINIC | Facility: CLINIC | Age: 76
End: 2021-06-11
Payer: COMMERCIAL

## 2021-06-11 ENCOUNTER — TELEPHONE (OUTPATIENT)
Dept: ADMINISTRATIVE | Facility: OTHER | Age: 76
End: 2021-06-11

## 2021-06-11 VITALS
SYSTOLIC BLOOD PRESSURE: 114 MMHG | DIASTOLIC BLOOD PRESSURE: 68 MMHG | TEMPERATURE: 98.1 F | HEIGHT: 62 IN | WEIGHT: 126 LBS | HEART RATE: 74 BPM | BODY MASS INDEX: 23.19 KG/M2 | RESPIRATION RATE: 16 BRPM | OXYGEN SATURATION: 98 %

## 2021-06-11 DIAGNOSIS — Z00.00 WELL ADULT EXAM: Primary | ICD-10-CM

## 2021-06-11 DIAGNOSIS — E78.2 MIXED HYPERLIPIDEMIA: ICD-10-CM

## 2021-06-11 DIAGNOSIS — Z12.11 SCREENING FOR COLORECTAL CANCER: ICD-10-CM

## 2021-06-11 DIAGNOSIS — Z11.59 NEED FOR HEPATITIS C SCREENING TEST: ICD-10-CM

## 2021-06-11 DIAGNOSIS — I10 ESSENTIAL HYPERTENSION: ICD-10-CM

## 2021-06-11 DIAGNOSIS — F32.5 MAJOR DEPRESSIVE DISORDER WITH SINGLE EPISODE, IN REMISSION (HCC): ICD-10-CM

## 2021-06-11 DIAGNOSIS — Z12.12 SCREENING FOR COLORECTAL CANCER: ICD-10-CM

## 2021-06-11 DIAGNOSIS — M81.0 AGE-RELATED OSTEOPOROSIS WITHOUT CURRENT PATHOLOGICAL FRACTURE: ICD-10-CM

## 2021-06-11 PROCEDURE — 3288F FALL RISK ASSESSMENT DOCD: CPT | Performed by: FAMILY MEDICINE

## 2021-06-11 PROCEDURE — 3074F SYST BP LT 130 MM HG: CPT | Performed by: FAMILY MEDICINE

## 2021-06-11 PROCEDURE — 3725F SCREEN DEPRESSION PERFORMED: CPT | Performed by: FAMILY MEDICINE

## 2021-06-11 PROCEDURE — 1125F AMNT PAIN NOTED PAIN PRSNT: CPT | Performed by: FAMILY MEDICINE

## 2021-06-11 PROCEDURE — 3078F DIAST BP <80 MM HG: CPT | Performed by: FAMILY MEDICINE

## 2021-06-11 PROCEDURE — 1101F PT FALLS ASSESS-DOCD LE1/YR: CPT | Performed by: FAMILY MEDICINE

## 2021-06-11 PROCEDURE — G0439 PPPS, SUBSEQ VISIT: HCPCS | Performed by: FAMILY MEDICINE

## 2021-06-11 PROCEDURE — 1036F TOBACCO NON-USER: CPT | Performed by: FAMILY MEDICINE

## 2021-06-11 PROCEDURE — 1160F RVW MEDS BY RX/DR IN RCRD: CPT | Performed by: FAMILY MEDICINE

## 2021-06-11 PROCEDURE — 99214 OFFICE O/P EST MOD 30 MIN: CPT | Performed by: FAMILY MEDICINE

## 2021-06-11 PROCEDURE — 1170F FXNL STATUS ASSESSED: CPT | Performed by: FAMILY MEDICINE

## 2021-06-11 PROCEDURE — 3008F BODY MASS INDEX DOCD: CPT | Performed by: FAMILY MEDICINE

## 2021-06-11 RX ORDER — METAPROTERENOL SULFATE 10 MG
TABLET ORAL
COMMUNITY

## 2021-06-11 NOTE — LETTER
Procedure Request Form: Colonoscopy      Date Requested: 21  Patient: Merrianne Few  Patient : 1945   Referring Provider: Quan Ramon, MD        Date of Procedure ______________________________       The above patient has informed us that they have completed their   most recent Colonoscopy at your facility  Please complete   this form and attach all corresponding procedure reports/results  Comments _______Approx 2018___________________________________________________  ____________________________________________________________________  ____________________________________________________________________  ____________________________________________________________________    Facility Completing Procedure _________________________________________    Form Completed By (print name) _______________________________________      Signature __________________________________________________________      These reports are needed for  compliance    Please fax this completed form and a copy of the procedure report to our office located at [place] [address] as soon as possible to [phone] radha Velasco Sheets: Phone [phone]    We thank you for your assistance in treating our mutual patient

## 2021-06-11 NOTE — PROGRESS NOTES
Assessment and Plan:     Problem List Items Addressed This Visit        Cardiovascular and Mediastinum    Essential hypertension     Patient is stable with current anti-hypertensive medicine and continue to follow a low sodium diet and take current medication  All questions about this condition were answered today  Other    Hyperlipidemia     Patient  is stable with current medication and we discussed a low fat low cholesterol diet  Weight loss also discussed for this will help lower cholesterol also  Recheck lipids in 6 months  Major depressive disorder with single episode, in remission [facility])     Patient to continue utilizing medical therapy as well and counseling sources as applicable for condition  If  suicidal thought or fear of suicide to contact 911 and seek help immediately  Meds reviewed and patient questions answered today           Other Visit Diagnoses     Well adult exam    -  Primary    Need for hepatitis C screening test        Screening for colorectal cancer        Age-related osteoporosis without current pathological fracture               Preventive health issues were discussed with patient, and age appropriate screening tests were ordered as noted in patient's After Visit Summary  Personalized health advice and appropriate referrals for health education or preventive services given if needed, as noted in patient's After Visit Summary       History of Present Illness:     Patient presents for Medicare Annual Wellness visit    Patient Care Team:  Stacey Molina MD as PCP - General (Family Medicine)  MD Stacey Zhu MD Jordis Pert, MD (Oncology)  Miladis Marsh PA-C (Vascular Surgery)  Alban Sharpe MD (Gastroenterology)     Problem List:     Patient Active Problem List   Diagnosis    Other chest pain    Essential hypertension    [facility]AD [facility]peripheral artery disease) ([facility] [address] )    Gross hematuria    Anxiety disorder    Hyperlipidemia    Hypothyroidism    Retroperitoneal mass    History of colon polyps    Major depressive disorder with single episode, in remission [facility])      Past Medical and Surgical History:     Past Medical History:   Diagnosis Date    Anxiety     Colon polyps     Gallbladder polyp     Kidney cysts     Kidney stones     Lung disease      Past Surgical History:   Procedure Laterality Date    COLONOSCOPY W/ POLYPECTOMY      NM EDG US EXAM SURGICAL ALTER STOM DUODENUM/JEJUNUM N/A 3/14/2019    Procedure: LINEAR ENDOSCOPIC U/S;  Surgeon: Satinder Euceda MD;  Location: [facility];   Service: Gastroenterology      Family History:     Family History   Problem Relation Age of Onset    Hyperlipidemia Mother     Heart attack Father     Other Sister         open heart surgery     Heart murmur Sister     Stroke Sister     Hyperlipidemia Sister         all siblings     Breast cancer Sister 79    Parkinsonism Sister     Heart attack Brother         open heart surgery     Deep vein thrombosis Brother     Hyperlipidemia Brother     Coronary artery disease Brother         stents placed     Heart attack Paternal Aunt     Heart attack Paternal Uncle     Hypertension Family         all in the family both sides    No Known Problems Daughter     No Known Problems Maternal Grandmother     No Known Problems Maternal Grandfather     No Known Problems Paternal Grandmother     No Known Problems Paternal Grandfather     No Known Problems Sister     No Known Problems Brother     No Known Problems Son     No Known Problems Son     Anuerysm Neg Hx     Heart failure Neg Hx       Social History:     E-Cigarette/Vaping    E-Cigarette Use Never User      E-Cigarette/Vaping Substances    Nicotine No     THC No     CBD No     Flavoring No     Other No     Unknown No      Social History     Socioeconomic History    Marital status: /Civil Union     Spouse name: None    Number of children: None    Years of education: None    Highest education level:  Bachelor's degree (kathy roman , BA, AB, BS)   Occupational History    Occupation: retired   Social Needs    Financial resource strain: None    Food insecurity     Worry: None     Inability: None    Transportation needs     Medical: None     Non-medical: None   Tobacco Use    Smoking status: Never Smoker    Smokeless tobacco: Never Used   Substance and Sexual Activity    Alcohol use: Yes     Frequency: Monthly or less     Drinks per session: 1 or 2     Binge frequency: Never     Comment: social - rare     Drug use: No    Sexual activity: Yes     Partners: Male   Lifestyle    Physical activity     Days per week: None     Minutes per session: None    Stress: None   Relationships    Social connections     Talks on phone: None     Gets together: None     Attends Zoroastrianism service: None     Active member of club or organization: None     Attends meetings of clubs or organizations: None     Relationship status: None    Intimate partner violence     Fear of current or ex partner: None     Emotionally abused: None     Physically abused: None     Forced sexual activity: None   Other Topics Concern    None   Social History Narrative    Most recent tobacco use screenin-      Do you currently or have you served in the [employer]:   No      Were you activated, into active duty, as a member of the [employer] or as a Reservist:   No      Occupation:   homemaker      Marital status:         Sexual orientation:   Heterosexual      Exercise level:   Occasional      Diet:   Regular      Alcohol intake:   Occasional      Caffeine intake:   Occasional      Chewing tobacco:   none      Illicit drugs:   denies      Guns present in home:   No      Seat belts used routinely:   Yes      Sunscreen used routinely:   No      stays out of sun; allergic to sunscreen    Live alone or with others:   with others      Smoke alarm in home:   Yes      Advance directive:   No      Has the Patient had a mammogram to screen for breast cancer within 24 months:   Yes      Please enter the date of the Patient's previous mammogram :   08-      Sexually active:   Yes       Medications and Allergies:     Current Outpatient Medications   Medication Sig Dispense Refill    Aspirin (ASPIR-81 PO) Take 81 mg by mouth daily      benazepril (LOTENSIN) 40 MG tablet Take 2 tablets (80 mg total) by mouth daily in the early morning 180 tablet 1    calcium-vitamin D 250-100 MG-UNIT per tablet Take 1 tablet by mouth 2 (two) times a day      Coenzyme Q10 (COQ-10) 100 MG CAPS Take 1 capsule by mouth daily      escitalopram (LEXAPRO) 10 mg tablet Take 1 tablet (10 mg total) by mouth daily at bedtime 90 tablet 1    ezetimibe (ZETIA) 10 mg tablet Take 1 tablet (10 mg total) by mouth daily 90 tablet 1    Magnesium Oxide -Mg Supplement 400 MG CAPS Take 1 capsule by mouth daily      metoprolol succinate (TOPROL-XL) 25 mg 24 hr tablet Take 1 tablet (25 mg total) by mouth daily at bedtime 90 tablet 1    Multiple Vitamin (multivitamin) capsule Take 1 capsule by mouth daily      niacin 500 mg tablet Take 500 mg by mouth daily with breakfast      Omega-3 Fatty Acids (Omega-3 Fish Oil) 500 MG CAPS Take by mouth      Probiotic Product (PROBIOTIC-10) CAPS Take by mouth daily      Red Yeast Rice 600 MG CAPS Take 2 capsules by mouth daily      nystatin (MYCOSTATIN) cream Apply topically 2 (two) times a day 30 g 0     No current facility-administered medications for this visit        Allergies   Allergen Reactions    Imdur [Isosorbide Nitrate] Headache    Sulfa Antibiotics Hives    Ciprofloxacin Rash and GI Intolerance    Macrobid [Nitrofurantoin] Other (See Comments)     Pt does not recall reaction      Immunizations:     Immunization History   Administered Date(s) Administered    INFLUENZA 11/11/2014, 11/18/2016, 11/01/2018, 10/07/2019    Pneumococcal Conjugate 13-Valent 11/11/2014    Pneumococcal Polysaccharide PPV23 11/10/2015    SARS-CoV-2 / COVID-19 mRNA IM ([employer] 02/17/2021, 03/09/2021      Health Maintenance:         Topic Date Due    Hepatitis C Screening  Never done    Colorectal Cancer Screening  Never done    MAMMOGRAM  10/19/2021         Topic Date Due    DTaP,Tdap,and Td Vaccines (1 - Tdap) Never done    Influenza Vaccine (Season Ended) 09/01/2021      Medicare Health Risk Assessment:     /68 (BP Location: Left arm, Patient Position: Sitting, Cuff Size: Standard)   Pulse 74   Temp 98 1 °F (36 7 °C)   Resp 16   Ht 5' 2" (1 575 m)   Wt 57 2 kg (126 lb)   SpO2 98%   BMI 23 05 kg/m²      Sarah Dooley is here for her Subsequent Wellness visit  Last Medicare Wellness visit information reviewed, patient interviewed and updates made to the record today  Health Risk Assessment:   Patient rates overall health as good  Patient feels that their physical health rating is same  Patient is very satisfied with their life  Eyesight was rated as same  Hearing was rated as same  Patient feels that their emotional and mental health rating is same  Patients states they are never, rarely angry  Patient states they are never, rarely unusually tired/fatigued  Pain experienced in the last 7 days has been some  Patient's pain rating has been 3/10  Patient states that she has experienced no weight loss or gain in last 6 months  Depression Screening:   PHQ-2 Score: 0  PHQ-9 Score: 2      Fall Risk Screening: In the past year, patient has experienced: no history of falling in past year      Urinary Incontinence Screening:   Patient has leaked urine accidently in the last six months  Home Safety:  Patient does not have trouble with stairs inside or outside of their home  Patient has working smoke alarms and has working carbon monoxide detector  Home safety hazards include: none  Nutrition:   Current diet is Regular  Medications:   Patient is currently taking over-the-counter supplements   OTC medications include: see medication list  Patient is able to manage medications  Activities of Daily Living (ADLs)/Instrumental Activities of Daily Living (IADLs):   Walk and transfer into and out of bed and chair?: Yes  Dress and groom yourself?: Yes    Bathe or shower yourself?: Yes    Feed yourself? Yes  Do your laundry/housekeeping?: Yes  Manage your money, pay your bills and track your expenses?: Yes  Make your own meals?: Yes    Do your own shopping?: Yes    Advance Care Planning:   Living will: Yes    Durable POA for healthcare: Yes    Advanced directive: Yes      PREVENTIVE SCREENINGS      Cardiovascular Screening:    General: Screening Not Indicated and History Lipid Disorder      Diabetes Screening:     General: Screening Current      Breast Cancer Screening:     General: Screening Current      Cervical Cancer Screening:    General: Screening Not Indicated      Osteoporosis Screening:    General: Screening Not Indicated and History Osteoporosis      Lung Cancer Screening:     General: Screening Not Indicated    Screening, Brief Intervention, and Referral to Treatment (SBIRT)    Screening      AUDIT-C Screenin) How often did you have a drink containing alcohol in the past year? monthly or less  2) How many drinks did you have on a typical day when you were drinking in the past year?  1 to 2  3) How often did you have 6 or more drinks on one occasion in the past year? never    AUDIT-C Score: 1  Interpretation: Score 0-2 (female): Negative screen for alcohol misuse    Single Item Drug Screening:  How often have you used an illegal drug (including marijuana) or a prescription medication for non-medical reasons in the past year? never    Single Item Drug Screen Score: 0  Interpretation: Negative screen for possible drug use disorder      Heber Zuluaga MD

## 2021-06-11 NOTE — TELEPHONE ENCOUNTER
----- Message from [employer] sent at 6/11/2021 10:52 AM EDT -----  Regarding: care gap request Colonoscopy  06/11/21 10:52 AM    Hello, our patient attached above has had CRC: Colonoscopy completed/performed  Please assist in updating the patient chart by making an External outreach to Dr Kaykay Kennedy facility located in [place], [place]  The date of service is 2018      Thank you,  Nena Fisher  [facility] [id number] [facility]

## 2021-06-11 NOTE — PATIENT INSTRUCTIONS
Medicare Preventive Visit Patient Instructions  Thank you for completing your Welcome to Medicare Visit or Medicare Annual Wellness Visit today  Your next wellness visit will be due in one year (6/12/2022)  The screening/preventive services that you may require over the next 5-10 years are detailed below  Some tests may not apply to you based off risk factors and/or age  Screening tests ordered at today's visit but not completed yet may show as past due  Also, please note that scanned in results may not display below  Preventive Screenings:  Service Recommendations Previous Testing/Comments   Colorectal Cancer Screening  * Colonoscopy    * Fecal Occult Blood Test (FOBT)/Fecal Immunochemical Test (FIT)  * Fecal DNA/Cologuard Test  * Flexible Sigmoidoscopy Age: 54-65 years old   Colonoscopy: every 10 years (may be performed more frequently if at higher risk)  OR  FOBT/FIT: every 1 year  OR  Cologuard: every 3 years  OR  Sigmoidoscopy: every 5 years  Screening may be recommended earlier than age 48 if at higher risk for colorectal cancer  Also, an individualized decision between you and your healthcare provider will decide whether screening between the ages of 74-80 would be appropriate  Colonoscopy: Not on file  FOBT/FIT: Not on file  Cologuard: Not on file  Sigmoidoscopy: Not on file          Breast Cancer Screening Age: 36 years old  Frequency: every 1-2 years  Not required if history of left and right mastectomy Mammogram: 10/19/2020    Screening Current   Cervical Cancer Screening Between the ages of 21-29, pap smear recommended once every 3 years  Between the ages of 33-67, can perform pap smear with HPV co-testing every 5 years     Recommendations may differ for women with a history of total hysterectomy, cervical cancer, or abnormal pap smears in past  Pap Smear: 10/12/2020    Screening Not Indicated   Hepatitis C Screening Once for adults born between 1945 and 1965  More frequently in patients at high risk for Hepatitis C Hep C Antibody: Not on file        Diabetes Screening 1-2 times per year if you're at risk for diabetes or have pre-diabetes Fasting glucose: 76 mg/dL   A1C: No results in last 5 years    Screening Current   Cholesterol Screening Once every 5 years if you don't have a lipid disorder  May order more often based on risk factors  Lipid panel: 06/03/2021    Screening Not Indicated  History Lipid Disorder     Other Preventive Screenings Covered by Medicare:  1  Abdominal Aortic Aneurysm (AAA) Screening: covered once if your at risk  You're considered to be at risk if you have a family history of AAA  2  Lung Cancer Screening: covers low dose CT scan once per year if you meet all of the following conditions: (1) Age 50-69; (2) No signs or symptoms of lung cancer; (3) Current smoker or have quit smoking within the last 15 years; (4) You have a tobacco smoking history of at least 30 pack years (packs per day multiplied by number of years you smoked); (5) You get a written order from a healthcare provider  3  Glaucoma Screening: covered annually if you're considered high risk: (1) You have diabetes OR (2) Family history of glaucoma OR (3)  aged 48 and older OR (3)  American aged 72 and older  3  Osteoporosis Screening: covered every 2 years if you meet one of the following conditions: (1) You're estrogen deficient and at risk for osteoporosis based off medical history and other findings; (2) Have a vertebral abnormality; (3) On glucocorticoid therapy for more than 3 months; (4) Have primary hyperparathyroidism; (5) On osteoporosis medications and need to assess response to drug therapy  · Last bone density test (DXA Scan): 10/19/2020   5  HIV Screening: covered annually if you're between the age of 15-65  Also covered annually if you are younger than [place] and older than 72 with risk factors for HIV infection   For pregnant patients, it is covered up to 3 times per pregnancy  Immunizations:  Immunization Recommendations   Influenza Vaccine Annual influenza vaccination during flu season is recommended for all persons aged >= 6 months who do not have contraindications   Pneumococcal Vaccine (Prevnar and Pneumovax)  * Prevnar = PCV13  * Pneumovax = PPSV23   Adults 25-60 years old: 1-3 doses may be recommended based on certain risk factors  Adults 72 years old: Prevnar (PCV13) vaccine recommended followed by Pneumovax (PPSV23) vaccine  If already received PPSV23 since turning 65, then PCV13 recommended at least one year after PPSV23 dose  Hepatitis B Vaccine 3 dose series if at intermediate or high risk (ex: diabetes, end stage renal disease, liver disease)   Tetanus (Td) Vaccine - COST NOT COVERED BY MEDICARE PART B Following completion of primary series, a booster dose should be given every 10 years to maintain immunity against tetanus  Td may also be given as tetanus wound prophylaxis  Tdap Vaccine - COST NOT COVERED BY MEDICARE PART B Recommended at least once for all adults  For pregnant patients, recommended with each pregnancy  Shingles Vaccine (Shingrix) - COST NOT COVERED BY MEDICARE PART B  2 shot series recommended in those aged 48 and above     Health Maintenance Due:      Topic Date Due    Hepatitis C Screening  Never done    Colorectal Cancer Screening  Never done    MAMMOGRAM  10/19/2021     Immunizations Due:      Topic Date Due    DTaP,Tdap,and Td Vaccines (1 - Tdap) Never done    Influenza Vaccine (Season Ended) 09/01/2021     Advance Directives   What are advance directives? Advance directives are legal documents that state your wishes and plans for medical care  These plans are made ahead of time in case you lose your ability to make decisions for yourself  Advance directives can apply to any medical decision, such as the treatments you want, and if you want to donate organs  What are the types of advance directives?   There are many types of advance directives, and each state has rules about how to use them  You may choose a combination of any of the following:  · Living will: This is a written record of the treatment you want  You can also choose which treatments you do not want, which to limit, and which to stop at a certain time  This includes surgery, medicine, IV fluid, and tube feedings  · Durable power of  for healthcare [facility]): This is a written record that states who you want to make healthcare choices for you when you are unable to make them for yourself  This person, called a proxy, is usually a family member or a friend  You may choose more than 1 proxy  · Do not resuscitate (DNR) order:  A DNR order is used in case your heart stops beating or you stop breathing  It is a request not to have certain forms of treatment, such as CPR  A DNR order may be included in other types of advance directives  · Medical directive: This covers the care that you want if you are in a coma, near death, or unable to make decisions for yourself  You can list the treatments you want for each condition  Treatment may include pain medicine, surgery, blood transfusions, dialysis, IV or tube feedings, and a ventilator (breathing machine)  · Values history: This document has questions about your views, beliefs, and how you feel and think about life  This information can help others choose the care that you would choose  Why are advance directives important? An advance directive helps you control your care  Although spoken wishes may be used, it is better to have your wishes written down  Spoken wishes can be misunderstood, or not followed  Treatments may be given even if you do not want them  An advance directive may make it easier for your family to make difficult choices about your care  Urinary Incontinence   Urinary incontinence (UI)  is when you lose control of your bladder   UI develops because your bladder cannot store or empty urine properly  The 3 most common types of UI are stress incontinence, urge incontinence, or both  Medicines:   · May be given to help strengthen your bladder control  Report any side effects of medication to your healthcare provider  Do pelvic muscle exercises often:  Your pelvic muscles help you stop urinating  Squeeze these muscles tight for 5 seconds, then relax for 5 seconds  Gradually work up to squeezing for 10 seconds  Do 3 sets of 15 repetitions a day, or as directed  This will help strengthen your pelvic muscles and improve bladder control  Train your bladder:  Go to the bathroom at set times, such as every 2 hours, even if you do not feel the urge to go  You can also try to hold your urine when you feel the urge to go  For example, hold your urine for 5 minutes when you feel the urge to go  As that becomes easier, hold your urine for 10 minutes  Self-care:   · Keep a UI record  Write down how often you leak urine and how much you leak  Make a note of what you were doing when you leaked urine  · Drink liquids as directed  You may need to limit the amount of liquid you drink to help control your urine leakage  Do not drink any liquid right before you go to bed  Limit or do not have drinks that contain caffeine or alcohol  · Prevent constipation  Eat a variety of high-fiber foods  Good examples are high-fiber cereals, beans, vegetables, and whole-grain breads  Walking is the best way to trigger your intestines to have a bowel movement  · Exercise regularly and maintain a healthy weight  Weight loss and exercise will decrease pressure on your bladder and help you control your leakage  · Use a catheter as directed  to help empty your bladder  A catheter is a tiny, plastic tube that is put into your bladder to drain your urine  · Go to behavior therapy as directed  Behavior therapy may be used to help you learn to control your urge to urinate         © Copyright [employer] 2018 Information is for End User's use only and may not be sold, redistributed or otherwise used for commercial purposes   All illustrations and images included in CareNotes® are the copyrighted property of A D A M , Inc  or Jcaklyn Patrick

## 2021-06-11 NOTE — LETTER
Procedure Request Form: Colonoscopy      Date Requested: 21  Patient: Lexi Lester  Patient : [id number]   Referring Provider: Ema Iron, MD        Date of Procedure ______________________________       The above patient has informed us that they have completed their   most recent Colonoscopy at your facility  Please complete   this form and attach all corresponding procedure reports/results  Comments ___Approx 2018_______________________________________________________  ____________________________________________________________________  ____________________________________________________________________  ____________________________________________________________________    Facility Completing Procedure _________________________________________    Form Completed By (print name) _______________________________________      Signature __________________________________________________________      These reports are needed for  compliance    Please fax this completed form and a copy of the procedure report to our office located at [place] [address] as soon as possible to [phone] radha Foley: Phone [phone]    We thank you for your assistance in treating our mutual patient

## 2021-06-28 NOTE — TELEPHONE ENCOUNTER
Upon review of the In Basket request and the patient's chart, initial outreach has been made via fax, please see Contacts section for details       Thank you  Ines Mathews MA

## 2021-07-09 NOTE — TELEPHONE ENCOUNTER
As a follow-up, a second attempt has been made for outreach via fax, please see Contacts section for details      Thank you  Mckenna Jules MA

## 2021-07-20 DIAGNOSIS — Z12.31 ENCOUNTER FOR SCREENING MAMMOGRAM FOR MALIGNANT NEOPLASM OF BREAST: Primary | ICD-10-CM

## 2021-09-13 ENCOUNTER — OFFICE VISIT (OUTPATIENT)
Dept: FAMILY MEDICINE CLINIC | Facility: CLINIC | Age: 76
End: 2021-09-13
Payer: COMMERCIAL

## 2021-09-13 VITALS
HEART RATE: 80 BPM | WEIGHT: 124 LBS | HEIGHT: 62 IN | BODY MASS INDEX: 22.82 KG/M2 | OXYGEN SATURATION: 98 % | RESPIRATION RATE: 18 BRPM | DIASTOLIC BLOOD PRESSURE: 70 MMHG | SYSTOLIC BLOOD PRESSURE: 136 MMHG | TEMPERATURE: 98.2 F

## 2021-09-13 DIAGNOSIS — K22.89 THICKENING OF ESOPHAGUS: Primary | ICD-10-CM

## 2021-09-13 PROCEDURE — 1036F TOBACCO NON-USER: CPT | Performed by: NURSE PRACTITIONER

## 2021-09-13 PROCEDURE — 99214 OFFICE O/P EST MOD 30 MIN: CPT | Performed by: NURSE PRACTITIONER

## 2021-09-13 PROCEDURE — 1160F RVW MEDS BY RX/DR IN RCRD: CPT | Performed by: NURSE PRACTITIONER

## 2021-09-13 RX ORDER — OMEPRAZOLE 40 MG/1
CAPSULE, DELAYED RELEASE ORAL
COMMUNITY
Start: 2021-09-07 | End: 2021-12-17

## 2021-09-13 RX ORDER — OMEPRAZOLE 20 MG/1
20 CAPSULE, DELAYED RELEASE ORAL
Qty: 90 CAPSULE | Refills: 3 | Status: SHIPPED | OUTPATIENT
Start: 2021-09-13 | End: 2021-12-17

## 2021-09-13 NOTE — PROGRESS NOTES
Assessment/Plan:   Esophageal thickening  Advised patient that on according to this CT scan she does have this esophageal thickening  Patient had taken Prilosec 40 mg in the past but stopped because she states he gives her diarrhea  She was also advised to take Prilosec during her recent hospitalization  Advised patient will get her an appointment with the Gastroenterology for follow-up also advised starting the Prilosec at 20 mg 1st thing in the a m  to take with food  Will contact patient with time and date for the follow-up appointment with gastro for the upper endoscopy if needed  Questions were answered patient verbalized very happy with the outcome of today's visit  Dosing all possible side effects of the prescribed medications or medications that had been prescribed in the past were reviewed and all questions were answered  Patient verbalized agreement and understanding of the plan of care as outlined during the office visit today return to office as indicated or sooner if a problem arises  Problem List Items Addressed This Visit     None            Subjective:      Patient ID: Edward Martell is a 70 y o  female  Patient is here to discuss a recent hospitalization that she had where she will have was suspected of having pulmonary embolism on chest pains  Everything was ruled out however of the CT of the chest demonstrated the patient does have espohigal and upper upper quadrant stomach thickening  Patient had taken a PPI in the past but no longer takes this  Patient did have an upper endoscopy several years ago but not sure on the results were the outcome of that  She denies any nausea vomiting diarrhea chest pains or shortness of breath        The following portions of the patient's history were reviewed and updated as appropriate: allergies, current medications, past family history, past medical history, past social history, past surgical history and problem list     Review of Systems   Constitutional: Negative for appetite change and fever  HENT: Negative for sinus pressure and sore throat  Eyes: Negative for pain  Respiratory: Negative for shortness of breath  Cardiovascular: Negative for chest pain  Gastrointestinal: Negative for abdominal pain  Genitourinary: Negative for dysuria  Musculoskeletal: Negative for arthralgias and myalgias  Skin: Negative for color change  Neurological: Negative for light-headedness  Psychiatric/Behavioral: Negative for behavioral problems  Objective: There were no vitals taken for this visit  Physical Exam  Vitals and nursing note reviewed  Constitutional:       General: She is not in acute distress  Appearance: She is well-developed  She is not diaphoretic  HENT:      Head: Normocephalic and atraumatic  Eyes:      Pupils: Pupils are equal, round, and reactive to light  Cardiovascular:      Rate and Rhythm: Normal rate and regular rhythm  Heart sounds: Normal heart sounds  Pulmonary:      Effort: Pulmonary effort is normal       Breath sounds: Normal breath sounds  Abdominal:      Palpations: Abdomen is soft  Musculoskeletal:         General: Normal range of motion  Cervical back: Normal range of motion  Skin:     General: Skin is dry  Neurological:      Mental Status: She is alert and oriented to person, place, and time  Psychiatric:         Behavior: Behavior normal          Thought Content:  Thought content normal

## 2021-09-29 NOTE — PROGRESS NOTES
[facility] - Outpatient Follow-up Note  Leatha Bingham 70 y o  female MRN: [id number]  Encounter: [id number]          ASSESSMENT AND PLAN:      1  Gastric/Esophageal thickening on CT  CT chest with contrast performed 09/06/2021 showing circumferential esophageal and gastric wall thickening  She has no symptoms  Denies nausea, vomiting, reflux, dysphagia, odynophagia, decreased appetite or unintentional weight loss  She had EUS in 2019 with gastritis and esophagitis with biopsies benign  She took PPI at that time however has not been on recently  Possibly secondary to reflux esophagitis, less likely infectious esophagitis  -plan for EGD  -discussed risk of the procedure including bleeding, infection and perforation  -continue Prilosec 20 mg for now  Further recommendations after endoscopy  Patient will follow-up after procedures needed  ______________________________________________________________________    SUBJECTIVE:      Leatha Bingham is a 19-year-old female past medical history hypothyroidism, hypertension, PAD, hyperlipidemia, MDD who presents for a follow up  Patient was last seen in 2019 for retroperitoneal mass  She followed up with surgical oncology with resolution  Patient presents today for a CTchest/PE study showing gastric and esophageal thickening  CT chest with contrast performed 09/06/2021 showing circumferential esophageal and gastric wall thickening possibly representing chronic reflux however correlation with upper endoscopy was recommended  She was not taking PPI therapy at that time  She followed up with PCP 09/13/2021 who placed her on Prilosec 20 mg in the morning  Patient denies any symptoms  She has no nausea, vomiting, reflux, dysphagia, odynophagia, decreased appetite or unintentional weight loss  EUS 3/2019 showed gastritis and esophagitis with benign biopsies  Last colonoscopy 7/2018 with repeat recommended in 5 years  REVIEW OF SYSTEMS IS OTHERWISE NEGATIVE  Historical Information   Past Medical History:   Diagnosis Date    Anxiety     Colon polyps     Gallbladder polyp     Kidney cysts     Kidney stones     Lung disease      Past Surgical History:   Procedure Laterality Date    COLONOSCOPY W/ POLYPECTOMY      IL EDG US EXAM SURGICAL ALTER STOM DUODENUM/JEJUNUM N/A 3/14/2019    Procedure: LINEAR ENDOSCOPIC U/S;  Surgeon: Marianne Torres MD;  Location:  GI LAB;   Service: Gastroenterology     Social History   Social History     Substance and Sexual Activity   Alcohol Use Yes    Comment: social - rare      Social History     Substance and Sexual Activity   Drug Use No     Social History     Tobacco Use   Smoking Status Never Smoker   Smokeless Tobacco Never Used     Family History   Problem Relation Age of Onset    Hyperlipidemia Mother     Heart attack Father     Other Sister         open heart surgery     Heart murmur Sister     Stroke Sister     Hyperlipidemia Sister         all siblings     Breast cancer Sister 79    Parkinsonism Sister     Heart attack Brother         open heart surgery     Deep vein thrombosis Brother     Hyperlipidemia Brother     Coronary artery disease Brother         stents placed     Heart attack Paternal Aunt     Heart attack Paternal Uncle     Hypertension Family         all in the family both sides    No Known Problems Daughter     No Known Problems Maternal Grandmother     No Known Problems Maternal Grandfather     No Known Problems Paternal Grandmother     No Known Problems Paternal Grandfather     No Known Problems Sister     No Known Problems Brother     No Known Problems Son     No Known Problems Son     Anuerysm Neg Hx     Heart failure Neg Hx        Meds/Allergies       Current Outpatient Medications:     Aspirin (ASPIR-81 PO)    benazepril (LOTENSIN) 40 MG tablet    calcium-vitamin D 250-100 MG-UNIT per tablet    Coenzyme Q10 (COQ-10) 100 MG CAPS    escitalopram (LEXAPRO) 10 mg tablet    ezetimibe (ZETIA) 10 mg tablet    Magnesium Oxide -Mg Supplement 400 MG CAPS    metoprolol succinate (TOPROL-XL) 25 mg 24 hr tablet    Multiple Vitamin (multivitamin) capsule    niacin 500 mg tablet    Omega-3 Fatty Acids (Omega-3 Fish Oil) 500 MG CAPS    omeprazole (PriLOSEC) 20 mg delayed release capsule    Red Yeast Rice 600 MG CAPS    omeprazole (PriLOSEC) 40 MG capsule    Allergies   Allergen Reactions    Imdur [Isosorbide Nitrate] Headache    Sulfa Antibiotics Hives    Ciprofloxacin Rash and GI Intolerance    Macrobid [Nitrofurantoin] Other (See Comments)     Pt does not recall reaction           Objective     Blood pressure 142/84, height 5' 2" (1 575 m), weight 57 2 kg (126 lb)  Body mass index is 23 05 kg/m²  PHYSICAL EXAM:      General Appearance:   Alert, cooperative, no distress   HEENT:   Normocephalic, atraumatic, anicteric      Neck:  Supple, symmetrical, trachea midline   Lungs:   Clear to auscultation bilaterally; no rales, rhonchi or wheezing; respirations unlabored    Heart[de-identified]   Regular rate and rhythm; no murmur, rub, or gallop  Abdomen:   Soft, non-tender, non-distended; normal bowel sounds; no masses, no organomegaly    Genitalia:   Deferred    Rectal:   Deferred    Extremities:  No cyanosis, clubbing or edema    Pulses:  2+ and symmetric    Skin:  No jaundice, rashes, or lesions    Lymph nodes:  No palpable cervical lymphadenopathy        Lab Results:   No visits with results within 1 Day(s) from this visit     Latest known visit with results is:   Orders Only on 06/03/2021   Component Date Value    Total Cholesterol 06/03/2021 175     HDL 06/03/2021 43*    Triglycerides 06/03/2021 101     LDL Calculated 06/03/2021 112*    Chol HDLC Ratio 06/03/2021 4 1     Non-HDL Cholesterol 06/03/2021 132*    Magnesium, Serum 06/03/2021 1 8     Uric Acid 06/03/2021 3 6     Glucose, Random 06/03/2021 86     BUN 06/03/2021 17     Creatinine 06/03/2021 0 92     eGFR Non  06/03/2021 62     eGFR  06/03/2021 72     SL AMB BUN/CREATININE RA* 23/05/4736 NOT APPLICABLE     Sodium 32/39/2034 137     Potassium 06/03/2021 3 9     Chloride 06/03/2021 102     CO2 06/03/2021 31     Calcium 06/03/2021 8 9     Protein, Total 06/03/2021 6 5     Albumin 06/03/2021 3 8     Globulin 06/03/2021 2 7     Albumin/Globulin Ratio 06/03/2021 1 4     TOTAL BILIRUBIN 06/03/2021 0 6     Alkaline Phosphatase 06/03/2021 57     AST 06/03/2021 20     ALT 06/03/2021 18     White Blood Cell Count 06/03/2021 5 8     Red Blood Cell Count 06/03/2021 3 90     Hemoglobin 06/03/2021 12 3     HCT 06/03/2021 37 9     MCV 06/03/2021 97 2     MCH 06/03/2021 31 5     MCHC 06/03/2021 32 5     RDW 06/03/2021 12 6     Platelet Count 53/59/9172 243     SL AMB MPV 06/03/2021 9 6     Neutrophils (Absolute) 06/03/2021 2,540     Lymphocytes (Absolute) 06/03/2021 2,401     Monocytes (Absolute) 06/03/2021 667     Eosinophils (Absolute) 06/03/2021 151     Basophils ABS 06/03/2021 41     Neutrophils 06/03/2021 43 8     Lymphocytes 06/03/2021 41 4     Monocytes 06/03/2021 11 5     Eosinophils 06/03/2021 2 6     Basophils PCT 06/03/2021 0 7     Free t4 06/03/2021 1 1     TSH 06/03/2021 3 05          Radiology Results:   No results found

## 2021-09-30 ENCOUNTER — TELEPHONE (OUTPATIENT)
Dept: GASTROENTEROLOGY | Facility: AMBULARY SURGERY CENTER | Age: 76
End: 2021-09-30

## 2021-09-30 ENCOUNTER — CONSULT (OUTPATIENT)
Dept: GASTROENTEROLOGY | Facility: AMBULARY SURGERY CENTER | Age: 76
End: 2021-09-30
Payer: COMMERCIAL

## 2021-09-30 VITALS
SYSTOLIC BLOOD PRESSURE: 142 MMHG | BODY MASS INDEX: 23.19 KG/M2 | HEIGHT: 62 IN | DIASTOLIC BLOOD PRESSURE: 84 MMHG | WEIGHT: 126 LBS

## 2021-09-30 DIAGNOSIS — K22.89 THICKENING OF ESOPHAGUS: ICD-10-CM

## 2021-09-30 PROCEDURE — 3008F BODY MASS INDEX DOCD: CPT | Performed by: NURSE PRACTITIONER

## 2021-09-30 PROCEDURE — 99213 OFFICE O/P EST LOW 20 MIN: CPT | Performed by: PHYSICIAN ASSISTANT

## 2021-09-30 PROCEDURE — 1160F RVW MEDS BY RX/DR IN RCRD: CPT | Performed by: PHYSICIAN ASSISTANT

## 2021-09-30 NOTE — TELEPHONE ENCOUNTER
Patient is scheduled for EGD on October 18, 2021 at [facility]  with Nataly Lechuga MD  Patient is aware of pre-procedure prep of nothing to eat or drink after midnight and they will be called the day prior between 2 and 6 pm for time to report for procedure

## 2021-10-04 ENCOUNTER — ANESTHESIA EVENT (OUTPATIENT)
Dept: ANESTHESIOLOGY | Facility: HOSPITAL | Age: 76
End: 2021-10-04

## 2021-10-04 ENCOUNTER — ANESTHESIA (OUTPATIENT)
Dept: ANESTHESIOLOGY | Facility: HOSPITAL | Age: 76
End: 2021-10-04

## 2021-10-08 ENCOUNTER — TELEMEDICINE (OUTPATIENT)
Dept: FAMILY MEDICINE CLINIC | Facility: CLINIC | Age: 76
End: 2021-10-08
Payer: COMMERCIAL

## 2021-10-08 VITALS — TEMPERATURE: 96.6 F

## 2021-10-08 DIAGNOSIS — J06.9 URI WITH COUGH AND CONGESTION: Primary | ICD-10-CM

## 2021-10-08 PROCEDURE — U0005 INFEC AGEN DETEC AMPLI PROBE: HCPCS | Performed by: NURSE PRACTITIONER

## 2021-10-08 PROCEDURE — U0003 INFECTIOUS AGENT DETECTION BY NUCLEIC ACID (DNA OR RNA); SEVERE ACUTE RESPIRATORY SYNDROME CORONAVIRUS 2 (SARS-COV-2) (CORONAVIRUS DISEASE [COVID-19]), AMPLIFIED PROBE TECHNIQUE, MAKING USE OF HIGH THROUGHPUT TECHNOLOGIES AS DESCRIBED BY CMS-2020-01-R: HCPCS | Performed by: NURSE PRACTITIONER

## 2021-10-08 PROCEDURE — 99441 PR PHYS/QHP TELEPHONE EVALUATION 5-10 MIN: CPT | Performed by: NURSE PRACTITIONER

## 2021-10-09 LAB — SARS-COV-2 RNA RESP QL NAA+PROBE: NEGATIVE

## 2021-10-15 ENCOUNTER — TELEPHONE (OUTPATIENT)
Dept: GASTROENTEROLOGY | Facility: AMBULARY SURGERY CENTER | Age: 76
End: 2021-10-15

## 2021-10-18 ENCOUNTER — TELEPHONE (OUTPATIENT)
Dept: GASTROENTEROLOGY | Facility: AMBULARY SURGERY CENTER | Age: 76
End: 2021-10-18

## 2021-10-18 ENCOUNTER — ANESTHESIA (OUTPATIENT)
Dept: GASTROENTEROLOGY | Facility: AMBULARY SURGERY CENTER | Age: 76
End: 2021-10-18

## 2021-10-18 ENCOUNTER — ANESTHESIA EVENT (OUTPATIENT)
Dept: GASTROENTEROLOGY | Facility: AMBULARY SURGERY CENTER | Age: 76
End: 2021-10-18

## 2021-10-18 ENCOUNTER — HOSPITAL ENCOUNTER (OUTPATIENT)
Dept: GASTROENTEROLOGY | Facility: AMBULARY SURGERY CENTER | Age: 76
Setting detail: OUTPATIENT SURGERY
Discharge: HOME | End: 2021-10-18
Attending: INTERNAL MEDICINE | Admitting: INTERNAL MEDICINE
Payer: COMMERCIAL

## 2021-10-18 VITALS
SYSTOLIC BLOOD PRESSURE: 174 MMHG | TEMPERATURE: 97 F | HEART RATE: 60 BPM | BODY MASS INDEX: 23.19 KG/M2 | OXYGEN SATURATION: 100 % | RESPIRATION RATE: 16 BRPM | DIASTOLIC BLOOD PRESSURE: 70 MMHG | WEIGHT: 126 LBS | HEIGHT: 62 IN

## 2021-10-18 DIAGNOSIS — K22.89 THICKENING OF ESOPHAGUS: ICD-10-CM

## 2021-10-18 PROCEDURE — 88305 TISSUE EXAM BY PATHOLOGIST: CPT | Performed by: PATHOLOGY

## 2021-10-18 PROCEDURE — 43239 EGD BIOPSY SINGLE/MULTIPLE: CPT | Performed by: INTERNAL MEDICINE

## 2021-10-18 RX ORDER — PROPOFOL 10 MG/ML
INJECTION, EMULSION INTRAVENOUS AS NEEDED
Status: DISCONTINUED | OUTPATIENT
Start: 2021-10-18 | End: 2021-10-18

## 2021-10-18 RX ORDER — LIDOCAINE HYDROCHLORIDE 20 MG/ML
INJECTION, SOLUTION EPIDURAL; INFILTRATION; INTRACAUDAL; PERINEURAL AS NEEDED
Status: DISCONTINUED | OUTPATIENT
Start: 2021-10-18 | End: 2021-10-18

## 2021-10-18 RX ORDER — SODIUM CHLORIDE, SODIUM LACTATE, POTASSIUM CHLORIDE, CALCIUM CHLORIDE 600; 310; 30; 20 MG/100ML; MG/100ML; MG/100ML; MG/100ML
INJECTION, SOLUTION INTRAVENOUS CONTINUOUS PRN
Status: DISCONTINUED | OUTPATIENT
Start: 2021-10-18 | End: 2021-10-18

## 2021-10-18 RX ADMIN — PROPOFOL 50 MG: 10 INJECTION, EMULSION INTRAVENOUS at 11:57

## 2021-10-18 RX ADMIN — SODIUM CHLORIDE, SODIUM LACTATE, POTASSIUM CHLORIDE, AND CALCIUM CHLORIDE: .6; .31; .03; .02 INJECTION, SOLUTION INTRAVENOUS at 11:52

## 2021-10-18 RX ADMIN — PROPOFOL 30 MG: 10 INJECTION, EMULSION INTRAVENOUS at 11:58

## 2021-10-18 RX ADMIN — LIDOCAINE HYDROCHLORIDE 60 MG: 20 INJECTION, SOLUTION EPIDURAL; INFILTRATION; INTRACAUDAL at 11:57

## 2021-10-26 ENCOUNTER — ANNUAL EXAM (OUTPATIENT)
Dept: OBGYN CLINIC | Facility: CLINIC | Age: 76
End: 2021-10-26
Payer: COMMERCIAL

## 2021-10-26 VITALS
HEIGHT: 62 IN | DIASTOLIC BLOOD PRESSURE: 74 MMHG | BODY MASS INDEX: 22.6 KG/M2 | SYSTOLIC BLOOD PRESSURE: 172 MMHG | WEIGHT: 122.8 LBS

## 2021-10-26 DIAGNOSIS — Z01.419 WOMEN'S ANNUAL ROUTINE GYNECOLOGICAL EXAMINATION: Primary | ICD-10-CM

## 2021-10-26 DIAGNOSIS — N64.4 BREAST PAIN: ICD-10-CM

## 2021-10-26 PROCEDURE — S0612 ANNUAL GYNECOLOGICAL EXAMINA: HCPCS | Performed by: OBSTETRICS & GYNECOLOGY

## 2021-10-26 PROCEDURE — 3008F BODY MASS INDEX DOCD: CPT | Performed by: PHYSICIAN ASSISTANT

## 2021-10-28 DIAGNOSIS — I10 ESSENTIAL HYPERTENSION: ICD-10-CM

## 2021-10-28 RX ORDER — METOPROLOL SUCCINATE 25 MG/1
25 TABLET, EXTENDED RELEASE ORAL
Qty: 90 TABLET | Refills: 1 | Status: SHIPPED | OUTPATIENT
Start: 2021-10-28 | End: 2022-04-25

## 2021-11-01 DIAGNOSIS — E78.5 HYPERLIPIDEMIA, UNSPECIFIED HYPERLIPIDEMIA TYPE: ICD-10-CM

## 2021-11-02 RX ORDER — EZETIMIBE 10 MG/1
10 TABLET ORAL DAILY
Qty: 90 TABLET | Refills: 1 | Status: SHIPPED | OUTPATIENT
Start: 2021-11-02 | End: 2022-04-29

## 2021-11-04 ENCOUNTER — HOSPITAL ENCOUNTER (OUTPATIENT)
Dept: MAMMOGRAPHY | Facility: CLINIC | Age: 76
Discharge: HOME | End: 2021-11-04
Payer: COMMERCIAL

## 2021-11-04 ENCOUNTER — HOSPITAL ENCOUNTER (OUTPATIENT)
Dept: ULTRASOUND IMAGING | Facility: CLINIC | Age: 76
Discharge: HOME | End: 2021-11-04
Payer: COMMERCIAL

## 2021-11-04 VITALS — HEIGHT: 62 IN | WEIGHT: 122 LBS | BODY MASS INDEX: 22.45 KG/M2

## 2021-11-04 DIAGNOSIS — N64.4 BREAST PAIN: ICD-10-CM

## 2021-11-04 PROCEDURE — G0279 TOMOSYNTHESIS, MAMMO: HCPCS

## 2021-11-04 PROCEDURE — 76642 ULTRASOUND BREAST LIMITED: CPT

## 2021-11-04 PROCEDURE — 77066 DX MAMMO INCL CAD BI: CPT

## 2021-11-10 ENCOUNTER — TELEPHONE (OUTPATIENT)
Dept: GASTROENTEROLOGY | Facility: CLINIC | Age: 76
End: 2021-11-10

## 2021-11-15 DIAGNOSIS — I10 ESSENTIAL HYPERTENSION: ICD-10-CM

## 2021-11-15 RX ORDER — BENAZEPRIL HYDROCHLORIDE 40 MG/1
80 TABLET, FILM COATED ORAL
Qty: 180 TABLET | Refills: 1 | Status: SHIPPED | OUTPATIENT
Start: 2021-11-15 | End: 2022-06-10

## 2021-11-23 ENCOUNTER — OFFICE VISIT (OUTPATIENT)
Dept: CARDIOLOGY CLINIC | Facility: MEDICAL CENTER | Age: 76
End: 2021-11-23
Payer: COMMERCIAL

## 2021-11-23 VITALS
BODY MASS INDEX: 22.82 KG/M2 | HEART RATE: 90 BPM | DIASTOLIC BLOOD PRESSURE: 68 MMHG | SYSTOLIC BLOOD PRESSURE: 148 MMHG | WEIGHT: 124 LBS | OXYGEN SATURATION: 98 % | HEIGHT: 62 IN

## 2021-11-23 DIAGNOSIS — I73.9 PAD (PERIPHERAL ARTERY DISEASE) (HCC): Primary | ICD-10-CM

## 2021-11-23 DIAGNOSIS — I10 ESSENTIAL HYPERTENSION: ICD-10-CM

## 2021-11-23 DIAGNOSIS — E78.2 MIXED HYPERLIPIDEMIA: ICD-10-CM

## 2021-11-23 DIAGNOSIS — R07.89 OTHER CHEST PAIN: ICD-10-CM

## 2021-11-23 PROCEDURE — 3078F DIAST BP <80 MM HG: CPT | Performed by: INTERNAL MEDICINE

## 2021-11-23 PROCEDURE — 3077F SYST BP >= 140 MM HG: CPT | Performed by: INTERNAL MEDICINE

## 2021-11-23 PROCEDURE — 3008F BODY MASS INDEX DOCD: CPT | Performed by: INTERNAL MEDICINE

## 2021-11-23 PROCEDURE — 1036F TOBACCO NON-USER: CPT | Performed by: INTERNAL MEDICINE

## 2021-11-23 PROCEDURE — 1160F RVW MEDS BY RX/DR IN RCRD: CPT | Performed by: INTERNAL MEDICINE

## 2021-11-23 PROCEDURE — 99214 OFFICE O/P EST MOD 30 MIN: CPT | Performed by: INTERNAL MEDICINE

## 2021-11-23 RX ORDER — AMLODIPINE BESYLATE 5 MG/1
5 TABLET ORAL DAILY
Qty: 90 TABLET | Refills: 3 | Status: SHIPPED | OUTPATIENT
Start: 2021-11-23

## 2021-12-09 ENCOUNTER — RA CDI HCC (OUTPATIENT)
Dept: OTHER | Facility: HOSPITAL | Age: 76
End: 2021-12-09

## 2021-12-09 LAB
ALBUMIN SERPL-MCNC: 3.9 G/DL (ref 3.6–5.1)
ALBUMIN/GLOB SERPL: 1.5 (CALC) (ref 1–2.5)
ALP SERPL-CCNC: 55 U/L (ref 37–153)
ALT SERPL-CCNC: 15 U/L (ref 6–29)
AST SERPL-CCNC: 20 U/L (ref 10–35)
BILIRUB SERPL-MCNC: 0.5 MG/DL (ref 0.2–1.2)
BUN SERPL-MCNC: 22 MG/DL (ref 7–25)
BUN/CREAT SERPL: NORMAL (CALC) (ref 6–22)
CALCIUM SERPL-MCNC: 9.1 MG/DL (ref 8.6–10.4)
CHLORIDE SERPL-SCNC: 105 MMOL/L (ref 98–110)
CHOLEST SERPL-MCNC: 175 MG/DL
CHOLEST/HDLC SERPL: 4.4 (CALC)
CO2 SERPL-SCNC: 28 MMOL/L (ref 20–32)
CREAT SERPL-MCNC: 0.9 MG/DL (ref 0.6–0.93)
GLOBULIN SER CALC-MCNC: 2.6 G/DL (CALC) (ref 1.9–3.7)
GLUCOSE SERPL-MCNC: 82 MG/DL (ref 65–99)
HCV AB S/CO SERPL IA: <0.02
HCV AB SERPL QL IA: NORMAL
HDLC SERPL-MCNC: 40 MG/DL
LDLC SERPL CALC-MCNC: 114 MG/DL (CALC)
NONHDLC SERPL-MCNC: 135 MG/DL (CALC)
POTASSIUM SERPL-SCNC: 4.3 MMOL/L (ref 3.5–5.3)
PROT SERPL-MCNC: 6.5 G/DL (ref 6.1–8.1)
SL AMB EGFR AFRICAN AMERICAN: 74 ML/MIN/1.73M2
SL AMB EGFR NON AFRICAN AMERICAN: 63 ML/MIN/1.73M2
SODIUM SERPL-SCNC: 140 MMOL/L (ref 135–146)
TRIGL SERPL-MCNC: 99 MG/DL

## 2021-12-17 ENCOUNTER — OFFICE VISIT (OUTPATIENT)
Dept: FAMILY MEDICINE CLINIC | Facility: CLINIC | Age: 76
End: 2021-12-17
Payer: COMMERCIAL

## 2021-12-17 VITALS
DIASTOLIC BLOOD PRESSURE: 70 MMHG | OXYGEN SATURATION: 98 % | TEMPERATURE: 97.6 F | RESPIRATION RATE: 16 BRPM | HEIGHT: 62 IN | BODY MASS INDEX: 23.26 KG/M2 | HEART RATE: 80 BPM | WEIGHT: 126.4 LBS | SYSTOLIC BLOOD PRESSURE: 118 MMHG

## 2021-12-17 DIAGNOSIS — Z12.11 SCREENING FOR COLORECTAL CANCER: Primary | ICD-10-CM

## 2021-12-17 DIAGNOSIS — K64.9 HEMORRHOIDS, UNSPECIFIED HEMORRHOID TYPE: ICD-10-CM

## 2021-12-17 DIAGNOSIS — E78.2 MIXED HYPERLIPIDEMIA: ICD-10-CM

## 2021-12-17 DIAGNOSIS — Z23 IMMUNIZATION DUE: ICD-10-CM

## 2021-12-17 DIAGNOSIS — Z12.12 SCREENING FOR COLORECTAL CANCER: Primary | ICD-10-CM

## 2021-12-17 DIAGNOSIS — I10 ESSENTIAL HYPERTENSION: ICD-10-CM

## 2021-12-17 DIAGNOSIS — F32.5 MAJOR DEPRESSIVE DISORDER WITH SINGLE EPISODE, IN REMISSION (HCC): ICD-10-CM

## 2021-12-17 DIAGNOSIS — E03.4 HYPOTHYROIDISM DUE TO ACQUIRED ATROPHY OF THYROID: ICD-10-CM

## 2021-12-17 DIAGNOSIS — I73.9 PAD (PERIPHERAL ARTERY DISEASE) (HCC): ICD-10-CM

## 2021-12-17 PROCEDURE — 1160F RVW MEDS BY RX/DR IN RCRD: CPT | Performed by: FAMILY MEDICINE

## 2021-12-17 PROCEDURE — 90662 IIV NO PRSV INCREASED AG IM: CPT | Performed by: FAMILY MEDICINE

## 2021-12-17 PROCEDURE — 3078F DIAST BP <80 MM HG: CPT | Performed by: FAMILY MEDICINE

## 2021-12-17 PROCEDURE — 99214 OFFICE O/P EST MOD 30 MIN: CPT | Performed by: FAMILY MEDICINE

## 2021-12-17 PROCEDURE — 3074F SYST BP LT 130 MM HG: CPT | Performed by: FAMILY MEDICINE

## 2021-12-17 PROCEDURE — G0008 ADMIN INFLUENZA VIRUS VAC: HCPCS | Performed by: FAMILY MEDICINE

## 2021-12-17 PROCEDURE — 3008F BODY MASS INDEX DOCD: CPT | Performed by: FAMILY MEDICINE

## 2021-12-17 PROCEDURE — 1036F TOBACCO NON-USER: CPT | Performed by: FAMILY MEDICINE

## 2022-01-03 DIAGNOSIS — F32.5 MAJOR DEPRESSIVE DISORDER WITH SINGLE EPISODE, IN REMISSION (HCC): ICD-10-CM

## 2022-01-03 RX ORDER — ESCITALOPRAM OXALATE 10 MG/1
10 TABLET ORAL
Qty: 90 TABLET | Refills: 1 | Status: SHIPPED | OUTPATIENT
Start: 2022-01-03 | End: 2022-06-13

## 2022-01-07 ENCOUNTER — OFFICE VISIT (OUTPATIENT)
Dept: FAMILY MEDICINE CLINIC | Facility: CLINIC | Age: 77
End: 2022-01-07
Payer: COMMERCIAL

## 2022-01-07 ENCOUNTER — APPOINTMENT (OUTPATIENT)
Dept: LAB | Facility: MEDICAL CENTER | Age: 77
End: 2022-01-07
Payer: COMMERCIAL

## 2022-01-07 VITALS
TEMPERATURE: 97.9 F | SYSTOLIC BLOOD PRESSURE: 154 MMHG | HEIGHT: 62 IN | HEART RATE: 73 BPM | DIASTOLIC BLOOD PRESSURE: 84 MMHG | WEIGHT: 123 LBS | BODY MASS INDEX: 22.63 KG/M2 | RESPIRATION RATE: 16 BRPM | OXYGEN SATURATION: 98 %

## 2022-01-07 DIAGNOSIS — E03.4 HYPOTHYROIDISM DUE TO ACQUIRED ATROPHY OF THYROID: ICD-10-CM

## 2022-01-07 DIAGNOSIS — I10 ESSENTIAL HYPERTENSION: Primary | ICD-10-CM

## 2022-01-07 DIAGNOSIS — E78.2 MIXED HYPERLIPIDEMIA: ICD-10-CM

## 2022-01-07 DIAGNOSIS — Z11.59 NEED FOR HEPATITIS C SCREENING TEST: ICD-10-CM

## 2022-01-07 LAB — TSH SERPL DL<=0.05 MIU/L-ACNC: 3.65 UIU/ML (ref 0.36–3.74)

## 2022-01-07 PROCEDURE — 1160F RVW MEDS BY RX/DR IN RCRD: CPT | Performed by: NURSE PRACTITIONER

## 2022-01-07 PROCEDURE — 99213 OFFICE O/P EST LOW 20 MIN: CPT | Performed by: NURSE PRACTITIONER

## 2022-01-07 PROCEDURE — 84443 ASSAY THYROID STIM HORMONE: CPT

## 2022-01-07 PROCEDURE — 3008F BODY MASS INDEX DOCD: CPT | Performed by: NURSE PRACTITIONER

## 2022-01-07 PROCEDURE — 3079F DIAST BP 80-89 MM HG: CPT | Performed by: NURSE PRACTITIONER

## 2022-01-07 PROCEDURE — 1036F TOBACCO NON-USER: CPT | Performed by: NURSE PRACTITIONER

## 2022-01-07 PROCEDURE — 3077F SYST BP >= 140 MM HG: CPT | Performed by: NURSE PRACTITIONER

## 2022-01-07 PROCEDURE — 36415 COLL VENOUS BLD VENIPUNCTURE: CPT

## 2022-01-07 NOTE — PROGRESS NOTES
Assessment/Plan:    Physical assessment demonstrate that bowel blood pressure without the amlodipine is poorly controlled  Patient is informed of this blood pressure readings were taken twice a both were above 150/85  Patient states that on the amlodipine she had had no syncopal episodes no shortness of breath or lower extremity swelling  I did advised that she restart the amlodipine 5 mg if she wants to employ at noon time dosing with that medication  it out from the metoprolol and benazepril it might be advantageous to for her feeling of fatigue  Patient did request an order for her next routine labs for 6 months advised will put a note in and have those put in her chart for her to complete before next office visit  Dosing all possible side effects of the prescribed medications or medications that had been prescribed in the past were reviewed and all questions were answered  Patient verbalized agreement and understanding of the plan of care as outlined during the office visit today return to office as indicated or sooner if a problem arises  Problem List Items Addressed This Visit        Cardiovascular and Mediastinum    Essential hypertension - Primary            Subjective:      Patient ID: Theresa Ley is a 78 y o  female  Patient is here for follow-up blood pressure check  Patient was seen by cardiologist he advised that she needed to add amlodipine 5 mg to her metoprolol and her benazepril as her blood pressure remains poorly controlled  Subsequent office visit she decided that she wanted to hold the amlodipine and recheck a blood pressure in 3 weeks  Blood pressure today I will be discussed and possibly starting amlodipine again will be discussed  Patient is recovering from [place] well she denies any symptoms related to that recovery        The following portions of the patient's history were reviewed and updated as appropriate: allergies, current medications, past family history, past medical history, past social history, past surgical history and problem list     Review of Systems   Constitutional: Positive for fatigue  Negative for appetite change and fever  HENT: Negative for sinus pressure and sore throat  Eyes: Negative for pain  Respiratory: Negative for shortness of breath  Cardiovascular: Negative for chest pain  Gastrointestinal: Negative for abdominal pain  Genitourinary: Negative for dysuria  Musculoskeletal: Negative for arthralgias and myalgias  Skin: Negative for color change  Neurological: Negative for light-headedness  Psychiatric/Behavioral: Negative for behavioral problems  Objective:      /84 (BP Location: Left arm, Patient Position: Sitting, Cuff Size: Standard)   Pulse 73   Temp 97 9 °F (36 6 °C)   Resp 16   Ht 5' 2" (1 575 m)   Wt 55 8 kg (123 lb)   SpO2 98%   BMI 22 50 kg/m²          Physical Exam  Constitutional:       General: She is not in acute distress  Cardiovascular:      Rate and Rhythm: Normal rate and regular rhythm  Pulses: Normal pulses  Heart sounds: Normal heart sounds  Pulmonary:      Effort: Pulmonary effort is normal       Breath sounds: Normal breath sounds  Neurological:      General: No focal deficit present  Mental Status: She is alert and oriented to person, place, and time     Psychiatric:         Behavior: Behavior normal

## 2022-04-23 DIAGNOSIS — I10 ESSENTIAL HYPERTENSION: ICD-10-CM

## 2022-04-25 RX ORDER — METOPROLOL SUCCINATE 25 MG/1
TABLET, EXTENDED RELEASE ORAL
Qty: 90 TABLET | Refills: 0 | Status: SHIPPED | OUTPATIENT
Start: 2022-04-25 | End: 2022-06-13

## 2022-04-29 DIAGNOSIS — E78.5 HYPERLIPIDEMIA, UNSPECIFIED HYPERLIPIDEMIA TYPE: ICD-10-CM

## 2022-04-29 RX ORDER — EZETIMIBE 10 MG/1
TABLET ORAL
Qty: 90 TABLET | Refills: 0 | Status: SHIPPED | OUTPATIENT
Start: 2022-04-29 | End: 2022-06-13

## 2022-06-10 DIAGNOSIS — I10 ESSENTIAL HYPERTENSION: ICD-10-CM

## 2022-06-10 RX ORDER — BENAZEPRIL HYDROCHLORIDE 40 MG/1
TABLET, FILM COATED ORAL
Qty: 180 TABLET | Refills: 0 | Status: SHIPPED | OUTPATIENT
Start: 2022-06-10

## 2022-06-13 DIAGNOSIS — E78.5 HYPERLIPIDEMIA, UNSPECIFIED HYPERLIPIDEMIA TYPE: ICD-10-CM

## 2022-06-13 DIAGNOSIS — F32.5 MAJOR DEPRESSIVE DISORDER WITH SINGLE EPISODE, IN REMISSION (HCC): ICD-10-CM

## 2022-06-13 DIAGNOSIS — I10 ESSENTIAL HYPERTENSION: ICD-10-CM

## 2022-06-13 RX ORDER — METOPROLOL SUCCINATE 25 MG/1
TABLET, EXTENDED RELEASE ORAL
Qty: 90 TABLET | Refills: 0 | Status: SHIPPED | OUTPATIENT
Start: 2022-06-13

## 2022-06-13 RX ORDER — EZETIMIBE 10 MG/1
TABLET ORAL
Qty: 90 TABLET | Refills: 0 | Status: SHIPPED | OUTPATIENT
Start: 2022-06-13

## 2022-06-13 RX ORDER — ESCITALOPRAM OXALATE 10 MG/1
TABLET ORAL
Qty: 90 TABLET | Refills: 0 | Status: SHIPPED | OUTPATIENT
Start: 2022-06-13

## 2022-06-28 ENCOUNTER — TELEPHONE (OUTPATIENT)
Dept: FAMILY MEDICINE CLINIC | Facility: CLINIC | Age: 77
End: 2022-06-28

## 2022-06-28 DIAGNOSIS — E78.00 PURE HYPERCHOLESTEROLEMIA: Primary | ICD-10-CM

## 2022-06-28 DIAGNOSIS — I10 ESSENTIAL HYPERTENSION: ICD-10-CM

## 2022-06-30 ENCOUNTER — APPOINTMENT (OUTPATIENT)
Dept: LAB | Facility: MEDICAL CENTER | Age: 77
End: 2022-06-30
Payer: COMMERCIAL

## 2022-06-30 DIAGNOSIS — I10 ESSENTIAL HYPERTENSION: ICD-10-CM

## 2022-06-30 DIAGNOSIS — E78.00 PURE HYPERCHOLESTEROLEMIA: ICD-10-CM

## 2022-06-30 LAB
ALBUMIN SERPL BCP-MCNC: 3.5 G/DL (ref 3.5–5)
ALP SERPL-CCNC: 59 U/L (ref 46–116)
ALT SERPL W P-5'-P-CCNC: 21 U/L (ref 12–78)
ANION GAP SERPL CALCULATED.3IONS-SCNC: 4 MMOL/L (ref 4–13)
AST SERPL W P-5'-P-CCNC: 21 U/L (ref 5–45)
BACTERIA UR QL AUTO: ABNORMAL /HPF
BASOPHILS # BLD AUTO: 0.05 THOUSANDS/ΜL (ref 0–0.1)
BASOPHILS NFR BLD AUTO: 1 % (ref 0–1)
BILIRUB SERPL-MCNC: 0.5 MG/DL (ref 0.2–1)
BILIRUB UR QL STRIP: NEGATIVE
BUN SERPL-MCNC: 20 MG/DL (ref 5–25)
CALCIUM SERPL-MCNC: 9.1 MG/DL (ref 8.3–10.1)
CHLORIDE SERPL-SCNC: 105 MMOL/L (ref 100–108)
CHOLEST SERPL-MCNC: 157 MG/DL
CLARITY UR: CLEAR
CO2 SERPL-SCNC: 28 MMOL/L (ref 21–32)
COLOR UR: ABNORMAL
CREAT SERPL-MCNC: 1.01 MG/DL (ref 0.6–1.3)
EOSINOPHIL # BLD AUTO: 0.14 THOUSAND/ΜL (ref 0–0.61)
EOSINOPHIL NFR BLD AUTO: 2 % (ref 0–6)
ERYTHROCYTE [DISTWIDTH] IN BLOOD BY AUTOMATED COUNT: 12.3 % (ref 11.6–15.1)
GFR SERPL CREATININE-BSD FRML MDRD: 54 ML/MIN/1.73SQ M
GLUCOSE P FAST SERPL-MCNC: 80 MG/DL (ref 65–99)
GLUCOSE UR STRIP-MCNC: NEGATIVE MG/DL
HCT VFR BLD AUTO: 37.4 % (ref 34.8–46.1)
HDLC SERPL-MCNC: 42 MG/DL
HGB BLD-MCNC: 11.9 G/DL (ref 11.5–15.4)
HGB UR QL STRIP.AUTO: ABNORMAL
HYALINE CASTS #/AREA URNS LPF: ABNORMAL /LPF
IMM GRANULOCYTES # BLD AUTO: 0.01 THOUSAND/UL (ref 0–0.2)
IMM GRANULOCYTES NFR BLD AUTO: 0 % (ref 0–2)
KETONES UR STRIP-MCNC: NEGATIVE MG/DL
LDLC SERPL CALC-MCNC: 97 MG/DL (ref 0–100)
LEUKOCYTE ESTERASE UR QL STRIP: ABNORMAL
LYMPHOCYTES # BLD AUTO: 2.61 THOUSANDS/ΜL (ref 0.6–4.47)
LYMPHOCYTES NFR BLD AUTO: 44 % (ref 14–44)
MAGNESIUM SERPL-MCNC: 2.2 MG/DL (ref 1.6–2.6)
MCH RBC QN AUTO: 31.4 PG (ref 26.8–34.3)
MCHC RBC AUTO-ENTMCNC: 31.8 G/DL (ref 31.4–37.4)
MCV RBC AUTO: 99 FL (ref 82–98)
MONOCYTES # BLD AUTO: 0.68 THOUSAND/ΜL (ref 0.17–1.22)
MONOCYTES NFR BLD AUTO: 11 % (ref 4–12)
NEUTROPHILS # BLD AUTO: 2.54 THOUSANDS/ΜL (ref 1.85–7.62)
NEUTS SEG NFR BLD AUTO: 42 % (ref 43–75)
NITRITE UR QL STRIP: NEGATIVE
NON-SQ EPI CELLS URNS QL MICRO: ABNORMAL /HPF
NRBC BLD AUTO-RTO: 0 /100 WBCS
PH UR STRIP.AUTO: 7 [PH]
PLATELET # BLD AUTO: 249 THOUSANDS/UL (ref 149–390)
PMV BLD AUTO: 9.9 FL (ref 8.9–12.7)
POTASSIUM SERPL-SCNC: 4.3 MMOL/L (ref 3.5–5.3)
PROT SERPL-MCNC: 7.1 G/DL (ref 6.4–8.2)
PROT UR STRIP-MCNC: ABNORMAL MG/DL
RBC # BLD AUTO: 3.79 MILLION/UL (ref 3.81–5.12)
RBC #/AREA URNS AUTO: ABNORMAL /HPF
SODIUM SERPL-SCNC: 137 MMOL/L (ref 136–145)
SP GR UR STRIP.AUTO: 1.02 (ref 1–1.03)
TRIGL SERPL-MCNC: 92 MG/DL
URATE SERPL-MCNC: 4.1 MG/DL (ref 2–6.8)
UROBILINOGEN UR STRIP-ACNC: <2 MG/DL
WBC # BLD AUTO: 6.03 THOUSAND/UL (ref 4.31–10.16)
WBC #/AREA URNS AUTO: ABNORMAL /HPF

## 2022-06-30 PROCEDURE — 81001 URINALYSIS AUTO W/SCOPE: CPT | Performed by: FAMILY MEDICINE

## 2022-06-30 PROCEDURE — 83735 ASSAY OF MAGNESIUM: CPT

## 2022-06-30 PROCEDURE — 84550 ASSAY OF BLOOD/URIC ACID: CPT

## 2022-06-30 PROCEDURE — 80053 COMPREHEN METABOLIC PANEL: CPT

## 2022-06-30 PROCEDURE — 85025 COMPLETE CBC W/AUTO DIFF WBC: CPT

## 2022-06-30 PROCEDURE — 36415 COLL VENOUS BLD VENIPUNCTURE: CPT

## 2022-06-30 PROCEDURE — 80061 LIPID PANEL: CPT

## 2022-07-08 ENCOUNTER — OFFICE VISIT (OUTPATIENT)
Dept: FAMILY MEDICINE CLINIC | Facility: CLINIC | Age: 77
End: 2022-07-08
Payer: COMMERCIAL

## 2022-07-08 VITALS
BODY MASS INDEX: 23.55 KG/M2 | SYSTOLIC BLOOD PRESSURE: 120 MMHG | TEMPERATURE: 97.5 F | HEIGHT: 62 IN | HEART RATE: 69 BPM | RESPIRATION RATE: 16 BRPM | OXYGEN SATURATION: 96 % | WEIGHT: 128 LBS | DIASTOLIC BLOOD PRESSURE: 62 MMHG

## 2022-07-08 DIAGNOSIS — E78.2 MIXED HYPERLIPIDEMIA: ICD-10-CM

## 2022-07-08 DIAGNOSIS — I10 ESSENTIAL HYPERTENSION: ICD-10-CM

## 2022-07-08 DIAGNOSIS — Z00.00 WELL ADULT EXAM: Primary | ICD-10-CM

## 2022-07-08 DIAGNOSIS — I73.9 PAD (PERIPHERAL ARTERY DISEASE) (HCC): ICD-10-CM

## 2022-07-08 DIAGNOSIS — F32.5 MAJOR DEPRESSIVE DISORDER WITH SINGLE EPISODE, IN REMISSION (HCC): ICD-10-CM

## 2022-07-08 DIAGNOSIS — F41.9 ANXIETY DISORDER, UNSPECIFIED TYPE: ICD-10-CM

## 2022-07-08 DIAGNOSIS — E03.4 HYPOTHYROIDISM DUE TO ACQUIRED ATROPHY OF THYROID: ICD-10-CM

## 2022-07-08 DIAGNOSIS — N18.31 STAGE 3A CHRONIC KIDNEY DISEASE (HCC): ICD-10-CM

## 2022-07-08 PROCEDURE — 99214 OFFICE O/P EST MOD 30 MIN: CPT | Performed by: FAMILY MEDICINE

## 2022-07-08 PROCEDURE — 1125F AMNT PAIN NOTED PAIN PRSNT: CPT | Performed by: FAMILY MEDICINE

## 2022-07-08 PROCEDURE — 3078F DIAST BP <80 MM HG: CPT | Performed by: FAMILY MEDICINE

## 2022-07-08 PROCEDURE — G0439 PPPS, SUBSEQ VISIT: HCPCS | Performed by: FAMILY MEDICINE

## 2022-07-08 PROCEDURE — 3288F FALL RISK ASSESSMENT DOCD: CPT | Performed by: FAMILY MEDICINE

## 2022-07-08 PROCEDURE — 1170F FXNL STATUS ASSESSED: CPT | Performed by: FAMILY MEDICINE

## 2022-07-08 PROCEDURE — 3074F SYST BP LT 130 MM HG: CPT | Performed by: FAMILY MEDICINE

## 2022-07-08 PROCEDURE — 1160F RVW MEDS BY RX/DR IN RCRD: CPT | Performed by: FAMILY MEDICINE

## 2022-07-08 PROCEDURE — 1101F PT FALLS ASSESS-DOCD LE1/YR: CPT | Performed by: FAMILY MEDICINE

## 2022-07-08 PROCEDURE — 3725F SCREEN DEPRESSION PERFORMED: CPT | Performed by: FAMILY MEDICINE

## 2022-07-08 NOTE — PROGRESS NOTES
Depression Screening and Follow-up Plan: Patient assessed for underlying major depression  Brief counseling provided and recommend additional follow-up/re-evaluation next office visit  Patient advised to follow-up with PCP for further management  Falls Plan of Care: balance, strength, and gait training instructions were provided  Home safety education provided  Assessment/Plan:         Problem List Items Addressed This Visit        Endocrine    Hypothyroidism     Patient to continue current dose of thyroid medicine and recheck TSH in 6 months              Cardiovascular and Mediastinum    Essential hypertension     Patient is stable with current anti-hypertensive medicine and continue to follow a low sodium diet and take current medication  All questions about this condition were answered today  PAD (peripheral artery disease) ([facility] [address] )     Patient is stable  and will continue present plan of care and reassess at next routine visit  All questions about this problem from patient were answered today  Other    Anxiety disorder     Patient to continue utilizing medical therapy as well as counseling sources as applicable to condition  If suicidal thought or fear of suicide attempt, to call 911 and seek help immediately  Medications and therapy reviewed today and all patient  questions answered today  Hyperlipidemia     Patient  is stable with current medication and we discussed a low fat low cholesterol diet  Weight loss also discussed for this will help lower cholesterol also  Recheck lipids in 6 months  Major depressive disorder with single episode, in remission [facility])     Patient to continue utilizing medical therapy as well and counseling sources as applicable for condition  If  suicidal thought or fear of suicide to contact 911 and seek help immediately   Meds reviewed and patient questions answered today             Other Visit Diagnoses     Well adult exam    - Primary            Subjective:      Patient ID: Angele Landau is a 78 y o  female  HPI    The following portions of the patient's history were reviewed and updated as appropriate:   Past Medical History:  She has a past medical history of Anxiety, Colon polyps, Gallbladder polyp, Kidney cysts, Kidney stones, and Lung disease ,  _______________________________________________________________________  Medical Problems:  does not have any pertinent problems on file ,  _______________________________________________________________________  Past Surgical History:   has a past surgical history that includes Colonoscopy w/ polypectomy; pr edg us exam surgical alter stom duodenum/jejunum (N/A, 3/14/2019); and Colonoscopy  ,  _______________________________________________________________________  Family History:  family history includes Breast cancer (age of onset: 79) in her sister; Cancer in her sister; Coronary artery disease in her brother; Deep vein thrombosis in her brother; Heart attack in her brother, father, paternal aunt, and paternal uncle; Heart murmur in her sister; Hyperlipidemia in her brother, mother, and sister; Hypertension in her family; No Known Problems in her brother, daughter, maternal grandfather, maternal grandmother, paternal grandfather, paternal grandmother, sister, son, and son; Other in her sister; Parkinsonism in her sister; Stroke in her sister  ,  _______________________________________________________________________  Social History:   reports that she has never smoked  She has never used smokeless tobacco  She reports current alcohol use  She reports that she does not use drugs  ,  _______________________________________________________________________  Allergies:  is allergic to imdur [isosorbide nitrate], sulfa antibiotics, ciprofloxacin, and macrobid [nitrofurantoin]     _______________________________________________________________________  Current Outpatient Medications Medication Sig Dispense Refill    amLODIPine (NORVASC) 5 mg tablet Take 1 tablet (5 mg total) by mouth daily 90 tablet 3    Aspirin (ASPIR-81 PO) Take 81 mg by mouth daily      benazepril (LOTENSIN) 40 MG tablet TAKE TWO TABLETS BY MOUTH DAILY in the early morning 180 tablet 0    calcium-vitamin D 250-100 MG-UNIT per tablet Take 1 tablet by mouth 2 (two) times a day      Coenzyme Q10 (COQ-10) 100 MG CAPS Take 1 capsule by mouth daily      escitalopram (LEXAPRO) 10 mg tablet TAKE ONE TABLET BY MOUTH AT BEDTIME 90 tablet 0    ezetimibe (ZETIA) 10 mg tablet TAKE ONE TABLET BY MOUTH ONCE DAILY 90 tablet 0    Magnesium Oxide -Mg Supplement 400 MG CAPS Take 1 capsule by mouth daily      metoprolol succinate (TOPROL-XL) 25 mg 24 hr tablet TAKE ONE TABLET BY MOUTH AT BEDTIME 90 tablet 0    niacin 500 mg tablet Take 500 mg by mouth daily with breakfast      Omega-3 Fatty Acids (Omega-3 Fish Oil) 500 MG CAPS Take by mouth      Red Yeast Rice 600 MG CAPS Take 2 capsules by mouth daily      Multiple Vitamin (multivitamin) capsule Take 1 capsule by mouth daily (Patient not taking: No sig reported)       No current facility-administered medications for this visit      _______________________________________________________________________  Review of Systems   Constitutional: Negative for activity change, appetite change, fatigue and fever  HENT: Negative for congestion, ear pain, postnasal drip, rhinorrhea, sinus pressure, sinus pain, sneezing and sore throat  Eyes: Negative for pain and redness  Respiratory: Negative for apnea, cough, chest tightness, shortness of breath and wheezing  Cardiovascular: Negative for chest pain, palpitations and leg swelling  Gastrointestinal: Negative for abdominal pain, constipation, diarrhea, nausea and vomiting  Endocrine: Negative for cold intolerance and heat intolerance  Genitourinary: Negative for difficulty urinating, dysuria, frequency, hematuria and urgency  Musculoskeletal: Negative for arthralgias, back pain, gait problem and myalgias  Skin: Negative for rash  Neurological: Negative for dizziness, speech difficulty, weakness, numbness and headaches  Hematological: Does not bruise/bleed easily  Psychiatric/Behavioral: Negative for agitation, confusion and hallucinations  Objective:  Vitals:    07/08/22 0805   BP: 120/62   BP Location: Left arm   Patient Position: Sitting   Cuff Size: Standard   Pulse: 69   Resp: 16   Temp: 97 5 °F (36 4 °C)   TempSrc: Temporal   SpO2: 96%   Weight: 58 1 kg (128 lb)   Height: 5' 2" (1 575 m)     Body mass index is 23 41 kg/m²  Physical Exam  Vitals and nursing note reviewed  Constitutional:       Appearance: She is well-developed  HENT:      Head: Normocephalic and atraumatic  Nose: Nose normal    Eyes:      General: No scleral icterus  Conjunctiva/sclera: Conjunctivae normal       Pupils: Pupils are equal, round, and reactive to light  Neck:      Thyroid: No thyromegaly  Cardiovascular:      Rate and Rhythm: Normal rate and regular rhythm  Pulmonary:      Effort: Pulmonary effort is normal       Breath sounds: Normal breath sounds  No wheezing  Abdominal:      General: Bowel sounds are normal  There is no distension  Palpations: Abdomen is soft  Tenderness: There is no abdominal tenderness  There is no guarding or rebound  Musculoskeletal:         General: No tenderness or deformity  Normal range of motion  Cervical back: Normal range of motion and neck supple  Skin:     General: Skin is warm and dry  Findings: No erythema or rash  Neurological:      Mental Status: She is alert and oriented to person, place, and time  Sensory: No sensory deficit  Psychiatric:         Behavior: Behavior normal          Thought Content:  Thought content normal          Judgment: Judgment normal

## 2022-07-08 NOTE — PROGRESS NOTES
Assessment and Plan:     Problem List Items Addressed This Visit        Endocrine    Hypothyroidism     Patient to continue current dose of thyroid medicine and recheck TSH in 6 months              Cardiovascular and Mediastinum    Essential hypertension     Patient is stable with current anti-hypertensive medicine and continue to follow a low sodium diet and take current medication  All questions about this condition were answered today  PAD (peripheral artery disease) ([facility] [address] )     Patient is stable  and will continue present plan of care and reassess at next routine visit  All questions about this problem from patient were answered today  Other    Anxiety disorder     Patient to continue utilizing medical therapy as well as counseling sources as applicable to condition  If suicidal thought or fear of suicide attempt, to call 911 and seek help immediately  Medications and therapy reviewed today and all patient  questions answered today  Hyperlipidemia     Patient  is stable with current medication and we discussed a low fat low cholesterol diet  Weight loss also discussed for this will help lower cholesterol also  Recheck lipids in 6 months  Major depressive disorder with single episode, in remission [facility])     Patient to continue utilizing medical therapy as well and counseling sources as applicable for condition  If  suicidal thought or fear of suicide to contact 911 and seek help immediately  Meds reviewed and patient questions answered today             Other Visit Diagnoses     Well adult exam    -  Primary           Preventive health issues were discussed with patient, and age appropriate screening tests were ordered as noted in patient's After Visit Summary  Personalized health advice and appropriate referrals for health education or preventive services given if needed, as noted in patient's After Visit Summary       History of Present Illness:     Patient presents for a Medicare Wellness Visit    HPI   Patient Care Team:  Stacey Molina MD as PCP - General (Family Medicine)  MD Stacey Zhu MD Jordis Pert, MD (Oncology)  Miladis Marsh PA-C (Vascular Surgery)  Alban Sharpe MD (Gastroenterology)     Review of Systems:     Review of Systems     Problem List:     Patient Active Problem List   Diagnosis    Other chest pain    Essential hypertension    PAD (peripheral artery disease) ([address] )    Gross hematuria    Anxiety disorder    Hyperlipidemia    Hypothyroidism    Retroperitoneal mass    History of colon polyps    Major depressive disorder with single episode, in remission [facility])      Past Medical and Surgical History:     Past Medical History:   Diagnosis Date    Anxiety     Colon polyps     Gallbladder polyp     Kidney cysts     Kidney stones     Lung disease      Past Surgical History:   Procedure Laterality Date    COLONOSCOPY      COLONOSCOPY W/ POLYPECTOMY      LA EDG US EXAM SURGICAL ALTER STOM DUODENUM/JEJUNUM N/A 3/14/2019    Procedure: LINEAR ENDOSCOPIC U/S;  Surgeon: Huy Thomas MD;  Location: [facility];   Service: Gastroenterology      Family History:     Family History   Problem Relation Age of Onset    Hyperlipidemia Mother     Heart attack Father     Other Sister         open heart surgery     Heart murmur Sister     Stroke Sister     Hyperlipidemia Sister         all siblings    Doris Webb Breast cancer Sister 79    Parkinsonism Sister     Cancer Sister     Heart attack Brother         open heart surgery     Deep vein thrombosis Brother     Hyperlipidemia Brother     Coronary artery disease Brother         stents placed     Heart attack Paternal Aunt     Heart attack Paternal Uncle     Hypertension Family         all in the family both sides    No Known Problems Daughter     No Known Problems Maternal Grandmother     No Known Problems Maternal Grandfather     No Known Problems Paternal Grandmother     No Known Problems Paternal Grandfather     No Known Problems Sister     No Known Problems Brother     No Known Problems Son     No Known Problems Son     Anuerysm Neg Hx     Heart failure Neg Hx       Social History:     Social History     Socioeconomic History    Marital status: /Civil Union     Spouse name: None    Number of children: None    Years of education: None    Highest education level:  Bachelor's degree (e jessie , BA, AB, BS)   Occupational History    Occupation: retired   Tobacco Use    Smoking status: Never Smoker    Smokeless tobacco: Never Used   Vaping Use    Vaping Use: Never used   Substance and Sexual Activity    Alcohol use: Yes     Comment: social - rare     Drug use: No    Sexual activity: Yes     Partners: Male   Other Topics Concern    None   Social History Narrative    Most recent tobacco use screenin-      Do you currently or have you served in the [employer]:   No      Were you activated, into active duty, as a member of the [employer] or as a Reservist:   No      Occupation:   homemaker      Marital status:         Sexual orientation:   Heterosexual      Exercise level:   Occasional      Diet:   Regular      Alcohol intake:   Occasional      Caffeine intake:   Occasional      Chewing tobacco:   none      Illicit drugs:   denies      Guns present in home:   No      Seat belts used routinely:   Yes      Sunscreen used routinely:   No      stays out of sun; allergic to sunscreen    Live alone or with others:   with others      Smoke alarm in home:   Yes      Advance directive:   No      Has the Patient had a mammogram to screen for breast cancer within 24 months:   Yes      Please enter the date of the Patient's previous mammogram :   08-      Sexually active:   Yes      Social Determinants of Health     Financial Resource Strain: Not on file   Food Insecurity: Not on file   Transportation Needs: Not on file   Physical Activity: Not on file Stress: Not on file   Social Connections: Not on file   Intimate Partner Violence: Not on file   Housing Stability: Not on file      Medications and Allergies:     Current Outpatient Medications   Medication Sig Dispense Refill    amLODIPine (NORVASC) 5 mg tablet Take 1 tablet (5 mg total) by mouth daily 90 tablet 3    Aspirin (ASPIR-81 PO) Take 81 mg by mouth daily      benazepril (LOTENSIN) 40 MG tablet TAKE TWO TABLETS BY MOUTH DAILY in the early morning 180 tablet 0    calcium-vitamin D 250-100 MG-UNIT per tablet Take 1 tablet by mouth 2 (two) times a day      Coenzyme Q10 (COQ-10) 100 MG CAPS Take 1 capsule by mouth daily      escitalopram (LEXAPRO) 10 mg tablet TAKE ONE TABLET BY MOUTH AT BEDTIME 90 tablet 0    ezetimibe (ZETIA) 10 mg tablet TAKE ONE TABLET BY MOUTH ONCE DAILY 90 tablet 0    Magnesium Oxide -Mg Supplement 400 MG CAPS Take 1 capsule by mouth daily      metoprolol succinate (TOPROL-XL) 25 mg 24 hr tablet TAKE ONE TABLET BY MOUTH AT BEDTIME 90 tablet 0    niacin 500 mg tablet Take 500 mg by mouth daily with breakfast      Omega-3 Fatty Acids (Omega-3 Fish Oil) 500 MG CAPS Take by mouth      Red Yeast Rice 600 MG CAPS Take 2 capsules by mouth daily      Multiple Vitamin (multivitamin) capsule Take 1 capsule by mouth daily (Patient not taking: No sig reported)       No current facility-administered medications for this visit       Allergies   Allergen Reactions    Imdur [Isosorbide Nitrate] Headache    Sulfa Antibiotics Hives    Ciprofloxacin Rash and GI Intolerance    Macrobid [Nitrofurantoin] Other (See Comments)     Pt does not recall reaction      Immunizations:     Immunization History   Administered Date(s) Administered    COVID-19 PFIZER VACCINE 0 3 ML IM 02/17/2021, 03/09/2021    INFLUENZA 11/11/2014, 11/18/2016, 11/01/2018, 10/07/2019    Influenza, high dose seasonal 0 7 mL 12/17/2021    Pneumococcal Conjugate 13-Valent 11/11/2014    Pneumococcal Polysaccharide PPV23 11/10/2015      Health Maintenance:         Topic Date Due    Colorectal Cancer Screening  Never done    Breast Cancer Screening: Mammogram  11/04/2022    Hepatitis C Screening  Completed         Topic Date Due    DTaP,Tdap,and Td Vaccines (1 - Tdap) Never done    COVID-19 Vaccine (3 - Booster for Pfizer series) 08/09/2021    Influenza Vaccine (1) 09/01/2022      Medicare Screening Tests and Risk Assessments:     Marciano Martines is here for her Subsequent Wellness visit  Health Risk Assessment:   Patient rates overall health as good  Patient feels that their physical health rating is same  Patient is very satisfied with their life  Eyesight was rated as same  Hearing was rated as same  Patient feels that their emotional and mental health rating is slightly better  Patients states they are never, rarely angry  Patient states they are never, rarely unusually tired/fatigued  Pain experienced in the last 7 days has been some  Patient's pain rating has been 6/10  Patient states that she has experienced no weight loss or gain in last 6 months  Depression Screening:   PHQ-9 Score: 0      Fall Risk Screening: In the past year, patient has experienced: no history of falling in past year      Urinary Incontinence Screening:   Patient has leaked urine accidently in the last six months  Home Safety:  Patient does not have trouble with stairs inside or outside of their home  Patient has working smoke alarms and has working carbon monoxide detector  Home safety hazards include: none  Nutrition:   Current diet is Regular  Medications:   Patient is currently taking over-the-counter supplements  OTC medications include: see medication list  Patient is able to manage medications  Activities of Daily Living (ADLs)/Instrumental Activities of Daily Living (IADLs):   Walk and transfer into and out of bed and chair?: Yes  Dress and groom yourself?: Yes    Bathe or shower yourself?: Yes    Feed yourself?  Yes  Do your laundry/housekeeping?: Yes  Manage your money, pay your bills and track your expenses?: Yes  Make your own meals?: Yes    Do your own shopping?: Yes    Previous Hospitalizations:   Any hospitalizations or ED visits within the last 12 months?: No      Advance Care Planning:   Living will: Yes    Durable POA for healthcare: Yes    Advanced directive: Yes    Advanced directive counseling given: No    Five wishes given: No    Patient declined ACP directive: No    End of Life Decisions reviewed with patient: Yes    Provider agrees with end of life decisions: Yes      Cognitive Screening:   Provider or family/friend/caregiver concerned regarding cognition?: No    PREVENTIVE SCREENINGS      Cardiovascular Screening:    General: Screening Not Indicated and History Lipid Disorder      Diabetes Screening:     General: Screening Current      Breast Cancer Screening:     General: Screening Current      Cervical Cancer Screening:    General: Screening Not Indicated      Lung Cancer Screening:     General: Screening Not Indicated      Hepatitis C Screening:    General: Screening Current    Screening, Brief Intervention, and Referral to Treatment (SBIRT)    Screening  Typical number of drinks in a day: 0  Typical number of drinks in a week: 0  Interpretation: Low risk drinking behavior      Single Item Drug Screening:  How often have you used an illegal drug (including marijuana) or a prescription medication for non-medical reasons in the past year? never    Single Item Drug Screen Score: 0  Interpretation: Negative screen for possible drug use disorder    No exam data present     Physical Exam:     /62 (BP Location: Left arm, Patient Position: Sitting, Cuff Size: Standard)   Pulse 69   Temp 97 5 °F (36 4 °C) (Temporal)   Resp 16   Ht 5' 2" (1 575 m)   Wt 58 1 kg (128 lb)   SpO2 96%   BMI 23 41 kg/m²     Physical Exam     Sadiq Mendez MD

## 2022-07-11 ENCOUNTER — TELEPHONE (OUTPATIENT)
Dept: ADMINISTRATIVE | Facility: OTHER | Age: 77
End: 2022-07-11

## 2022-07-11 NOTE — LETTER
[facility] Medical Records Request for Care Gap Closure    Medical Records Fax: [phone]    Date Requested: 22  Patient: Elmira Najera  Patient : 1945   Requesting on behalf of Provider: MD Antonina Lazaro MD has requested the retrieval and updating of CRC: Colonoscopy  The office staff has provided us with the following information listed below  Prior to sending this request I have reviewed Epic Chart Review, completed an Epic Chart Search, and reviewed Care Everywhere documents  Estimated Date of Service:   Facility: [place] [address] [facility]  Specialty: Bessy Talavera  Performing Provider: Dr Beckie Moreno  Additional Comments:      Your assistance in completing this request is greatly appreciated  Please send document to [phone] radha Acosta: Phone [phone]       Sincerely,      [place], [place]

## 2022-07-11 NOTE — TELEPHONE ENCOUNTER
Upon review of the In Basket request and the patient's chart, initial outreach has been made via fax, please see Contacts section for details  Thank you  Shania Del Cid     [address] [place] [address] Fax: [phone]

## 2022-07-11 NOTE — TELEPHONE ENCOUNTER
----- Message from [address] [place] [address] Express[address] sent at 7/8/2022  8:33 AM EDT -----  Regarding: colonoscopy  07/08/22 8:33 AM    Hello, our patient attached above has had CRC: Colonoscopy completed/performed  Please assist in updating the patient chart by making an External outreach to Dr Mike Hernandes facility located in [place]  The date of service is around 2018      Thank you,  Rhiannon Alston [facility]

## 2022-07-12 NOTE — TELEPHONE ENCOUNTER
Upon review of the In Basket request we were able to locate, review, and update the patient chart as requested for CRC: Colonoscopy  Any additional questions or concerns should be emailed to the Practice Liaisons via [email or web address] email, please do not reply via In Basket      Thank you  Franko Chowdhury, [address] [place] [address] Radha Vasquez

## 2022-08-24 ENCOUNTER — OFFICE VISIT (OUTPATIENT)
Dept: OBGYN CLINIC | Facility: CLINIC | Age: 77
End: 2022-08-24
Payer: COMMERCIAL

## 2022-08-24 VITALS
WEIGHT: 128 LBS | HEIGHT: 62 IN | DIASTOLIC BLOOD PRESSURE: 72 MMHG | BODY MASS INDEX: 23.55 KG/M2 | SYSTOLIC BLOOD PRESSURE: 136 MMHG

## 2022-08-24 DIAGNOSIS — N64.4 BREAST PAIN: Primary | ICD-10-CM

## 2022-08-24 PROCEDURE — 3078F DIAST BP <80 MM HG: CPT | Performed by: OBSTETRICS & GYNECOLOGY

## 2022-08-24 PROCEDURE — 3075F SYST BP GE 130 - 139MM HG: CPT | Performed by: OBSTETRICS & GYNECOLOGY

## 2022-08-24 PROCEDURE — 99214 OFFICE O/P EST MOD 30 MIN: CPT | Performed by: OBSTETRICS & GYNECOLOGY

## 2022-08-24 NOTE — PROGRESS NOTES
Assessment/Plan:     There are no diagnoses linked to this encounter  16-year-old female   Breast pain   Chronic hypertension  Sister breast cancer  Prior 3 vaginal delivery   Osteoporosis  Plan  Diagnostic mammogram and ultrasound  Primary oil evening  Sports bra   Decrease caffeine  Increase water intake   Return to office in 3 month follow-up on breast pain and annual exam    Subjective:      Patient ID: Yaz Montes is a 78 y o  female  HPI  16-year-old female presents to the office today secondary to breast pain patient noted the pain on the left breast pain is intermittent comes and go when it happened it is sharp different area on the breast  Patient denies any trauma to the breast denies any nipple discharge nothing makes the pain better or worse comes and go on its own  Patient has family history of breast cancer in her sister   Patient has prior mammogram diagnostic and ultrasound which reviewed with patient last year  Denies any chest pain shortness of breath      The following portions of the patient's history were reviewed and updated as appropriate: allergies, current medications, past family history, past medical history, past social history, past surgical history and problem list     Review of Systems      Objective:      /72 (BP Location: Left arm, Patient Position: Sitting, Cuff Size: Adult)   Ht 5' 2" (1 575 m)   Wt 58 1 kg (128 lb)   BMI 23 41 kg/m²          Physical Exam  Constitutional:       Appearance: Normal appearance  Chest:   Breasts:      Right: No swelling, bleeding, inverted nipple, mass, nipple discharge, skin change or tenderness  Left: No swelling, bleeding, inverted nipple, mass, nipple discharge, skin change or tenderness              Comments: Small nodularity noted bilateral area marked patient was having mild discomfort on exam no pain noted on the left breast on exam today bilateral fibrocystic changes noted  Neurological:      General: No focal deficit present  Mental Status: She is alert and oriented to person, place, and time     Psychiatric:         Mood and Affect: Mood normal          Behavior: Behavior normal

## 2022-08-31 ENCOUNTER — OFFICE VISIT (OUTPATIENT)
Dept: CARDIOLOGY CLINIC | Facility: MEDICAL CENTER | Age: 77
End: 2022-08-31
Payer: COMMERCIAL

## 2022-08-31 VITALS
DIASTOLIC BLOOD PRESSURE: 70 MMHG | WEIGHT: 127 LBS | OXYGEN SATURATION: 99 % | HEIGHT: 62 IN | BODY MASS INDEX: 23.37 KG/M2 | HEART RATE: 64 BPM | SYSTOLIC BLOOD PRESSURE: 140 MMHG

## 2022-08-31 DIAGNOSIS — I73.9 PAD (PERIPHERAL ARTERY DISEASE) (HCC): ICD-10-CM

## 2022-08-31 DIAGNOSIS — E78.2 MIXED HYPERLIPIDEMIA: Primary | ICD-10-CM

## 2022-08-31 DIAGNOSIS — I10 ESSENTIAL HYPERTENSION: ICD-10-CM

## 2022-08-31 PROCEDURE — 1160F RVW MEDS BY RX/DR IN RCRD: CPT | Performed by: INTERNAL MEDICINE

## 2022-08-31 PROCEDURE — 99214 OFFICE O/P EST MOD 30 MIN: CPT | Performed by: INTERNAL MEDICINE

## 2022-08-31 NOTE — PROGRESS NOTES
Cardiology   Angele Landau 78 y o  female MRN: [id number]        Impression:  1  Chest pain - no evidence of myocardial ischemia  No further episodes  2  Hypertension - borderline control  3  Dyslipidemia - on Zetia  4  Moderate Bilateral LE PAD by vascular study - no PAD by vascular surgeon      Recommendations:  1  Discontinue aspirin  2  Continue remainder of medications  3  Follow up in 6 months             HPI: Angele Landau is a 78y o  year old female with hypertension and dyslipidemia who presents for follow up  [facility] pharmacologic stress test instead of an exercise stress test b/c of elevated blood pressure - no ischemia  No chest pain, shortness of breath, or palpitations  Does have some dyspnea with significant exertion  Does have significant aspirin             Review of Systems   Constitutional: Negative  HENT: Negative  Eyes: Negative  Respiratory: Negative for chest tightness and shortness of breath  Cardiovascular: Negative for chest pain, palpitations and leg swelling  Gastrointestinal: Negative  Endocrine: Negative  Genitourinary: Negative  Musculoskeletal: Negative  Skin: Negative  Allergic/Immunologic: Negative  Neurological: Negative  Hematological: Negative  Psychiatric/Behavioral: Negative  All other systems reviewed and are negative  Past Medical History:   Diagnosis Date    Anxiety     Colon polyps     Gallbladder polyp     Kidney cysts     Kidney stones     Lung disease      Past Surgical History:   Procedure Laterality Date    COLONOSCOPY      COLONOSCOPY W/ POLYPECTOMY      MD EDG US EXAM SURGICAL ALTER STOM DUODENUM/JEJUNUM N/A 3/14/2019    Procedure: LINEAR ENDOSCOPIC U/S;  Surgeon: Kash Davison MD;  [facility];   Service: Gastroenterology     Social History     Substance and Sexual Activity   Alcohol Use Yes    Comment: social - rare      Social History     Substance and Sexual Activity   Drug Use No Social History     Tobacco Use   Smoking Status Never Smoker   Smokeless Tobacco Never Used     Family History   Problem Relation Age of Onset    Hyperlipidemia Mother     Heart attack Father     Other Sister         open heart surgery     Heart murmur Sister     Stroke Sister     Hyperlipidemia Sister         all siblings    Earna Abbie Breast cancer Sister 79    Parkinsonism Sister    Earna Abbie Cancer Sister     Heart attack Brother         open heart surgery     Deep vein thrombosis Brother     Hyperlipidemia Brother     Coronary artery disease Brother         stents placed     Heart attack Paternal Aunt     Heart attack Paternal Uncle     Hypertension Family         all in the family both sides    No Known Problems Daughter     No Known Problems Maternal Grandmother     No Known Problems Maternal Grandfather     No Known Problems Paternal Grandmother     No Known Problems Paternal Grandfather     No Known Problems Sister     No Known Problems Brother     No Known Problems Son     No Known Problems Son     Anuerysm Neg Hx     Heart failure Neg Hx        Allergies:   Allergies   Allergen Reactions    Imdur [Isosorbide Nitrate] Headache    Sulfa Antibiotics Hives    Ciprofloxacin Rash and GI Intolerance    Macrobid [Nitrofurantoin] Other (See Comments)     Pt does not recall reaction       Medications:     Current Outpatient Medications:     amLODIPine (NORVASC) 5 mg tablet, Take 1 tablet (5 mg total) by mouth daily, Disp: 90 tablet, Rfl: 3    Aspirin (ASPIR-81 PO), Take 81 mg by mouth daily, Disp: , Rfl:     benazepril (LOTENSIN) 40 MG tablet, TAKE TWO TABLETS BY MOUTH DAILY in the early morning, Disp: 180 tablet, Rfl: 0    calcium-vitamin D 250-100 MG-UNIT per tablet, Take 1 tablet by mouth 2 (two) times a day, Disp: , Rfl:     Coenzyme Q10 (COQ-10) 100 MG CAPS, Take 1 capsule by mouth daily, Disp: , Rfl:     escitalopram (LEXAPRO) 10 mg tablet, TAKE ONE TABLET BY MOUTH AT BEDTIME, Disp: 90 tablet, Rfl: 0    ezetimibe (ZETIA) 10 mg tablet, TAKE ONE TABLET BY MOUTH ONCE DAILY, Disp: 90 tablet, Rfl: 0    Magnesium Oxide -Mg Supplement 400 MG CAPS, Take 1 capsule by mouth daily, Disp: , Rfl:     metoprolol succinate (TOPROL-XL) 25 mg 24 hr tablet, TAKE ONE TABLET BY MOUTH AT BEDTIME, Disp: 90 tablet, Rfl: 0    Multiple Vitamin (multivitamin) capsule, Take 1 capsule by mouth daily, Disp: , Rfl:     niacin 500 mg tablet, Take 500 mg by mouth daily with breakfast, Disp: , Rfl:     Omega-3 Fatty Acids (Omega-3 Fish Oil) 500 MG CAPS, Take by mouth, Disp: , Rfl:     Red Yeast Rice 600 MG CAPS, Take 2 capsules by mouth daily, Disp: , Rfl:       Wt Readings from Last 3 Encounters:   08/31/22 57 6 kg (127 lb)   08/24/22 58 1 kg (128 lb)   07/08/22 58 1 kg (128 lb)     Temp Readings from Last 3 Encounters:   07/08/22 97 5 °F (36 4 °C) (Temporal)   01/07/22 97 9 °F (36 6 °C)   12/17/21 97 6 °F (36 4 °C)     BP Readings from Last 3 Encounters:   08/31/22 140/70   08/24/22 136/72   07/08/22 120/62     Pulse Readings from Last 3 Encounters:   08/31/22 64   07/08/22 69   01/07/22 73         Physical Exam  HENT:      Head: Atraumatic  Mouth/Throat:      Mouth: Mucous membranes are moist    Eyes:      Extraocular Movements: Extraocular movements intact  Cardiovascular:      Rate and Rhythm: Normal rate and regular rhythm  Heart sounds: Normal heart sounds  Pulmonary:      Effort: Pulmonary effort is normal       Breath sounds: Normal breath sounds  Abdominal:      General: Abdomen is flat  Musculoskeletal:         General: Normal range of motion  Cervical back: Normal range of motion  Skin:     General: Skin is warm  Neurological:      General: No focal deficit present  Mental Status: She is alert and oriented to person, place, and time     Psychiatric:         Mood and Affect: Mood normal          Behavior: Behavior normal            Laboratory Studies:  CMP:  Lab Results   Component Value Date    K 4 3 06/30/2022     06/30/2022    CO2 28 06/30/2022    BUN 20 06/30/2022    CREATININE 1 01 06/30/2022    AST 21 06/30/2022    ALT 21 06/30/2022    EGFR 54 06/30/2022       Lipid Profile:   No results found for: CHOL  Lab Results   Component Value Date    HDL 42 (L) 06/30/2022     Lab Results   Component Value Date    LDLCALC 97 06/30/2022

## 2022-09-02 DIAGNOSIS — I10 ESSENTIAL HYPERTENSION: ICD-10-CM

## 2022-09-02 RX ORDER — BENAZEPRIL HYDROCHLORIDE 40 MG/1
TABLET, FILM COATED ORAL
Qty: 180 TABLET | Refills: 0 | Status: SHIPPED | OUTPATIENT
Start: 2022-09-02

## 2022-09-14 DIAGNOSIS — F32.5 MAJOR DEPRESSIVE DISORDER WITH SINGLE EPISODE, IN REMISSION (HCC): ICD-10-CM

## 2022-09-14 RX ORDER — ESCITALOPRAM OXALATE 10 MG/1
TABLET ORAL
Qty: 90 TABLET | Refills: 0 | Status: SHIPPED | OUTPATIENT
Start: 2022-09-14

## 2022-10-22 DIAGNOSIS — I10 ESSENTIAL HYPERTENSION: ICD-10-CM

## 2022-10-24 RX ORDER — METOPROLOL SUCCINATE 25 MG/1
TABLET, EXTENDED RELEASE ORAL
Qty: 90 TABLET | Refills: 0 | Status: SHIPPED | OUTPATIENT
Start: 2022-10-24

## 2022-10-25 ENCOUNTER — OFFICE VISIT (OUTPATIENT)
Dept: VASCULAR SURGERY | Facility: CLINIC | Age: 77
End: 2022-10-25
Payer: COMMERCIAL

## 2022-10-25 VITALS
DIASTOLIC BLOOD PRESSURE: 72 MMHG | BODY MASS INDEX: 23.37 KG/M2 | TEMPERATURE: 97.6 F | WEIGHT: 127 LBS | SYSTOLIC BLOOD PRESSURE: 122 MMHG | HEIGHT: 62 IN | HEART RATE: 72 BPM

## 2022-10-25 DIAGNOSIS — I83.813 VARICOSE VEINS OF BOTH LOWER EXTREMITIES WITH PAIN: Primary | ICD-10-CM

## 2022-10-25 DIAGNOSIS — E78.5 HYPERLIPIDEMIA, UNSPECIFIED HYPERLIPIDEMIA TYPE: ICD-10-CM

## 2022-10-25 DIAGNOSIS — I10 ESSENTIAL HYPERTENSION: ICD-10-CM

## 2022-10-25 DIAGNOSIS — E78.2 MIXED HYPERLIPIDEMIA: ICD-10-CM

## 2022-10-25 PROCEDURE — 99213 OFFICE O/P EST LOW 20 MIN: CPT | Performed by: PHYSICIAN ASSISTANT

## 2022-10-25 RX ORDER — EZETIMIBE 10 MG/1
TABLET ORAL
Qty: 90 TABLET | Refills: 0 | Status: SHIPPED | OUTPATIENT
Start: 2022-10-25

## 2022-10-25 NOTE — PROGRESS NOTES
Assessment/Plan:    Varicose veins of both lower extremities with pain  -     Compression Stocking    -worsening nighttime calf and leg cramping (2-3 times weekly)  -no symptoms of peripheral arterial disease    -examination reveals 2+ femoral and DP pulses; about 2nd capillary refill  -small spider, reticular varicosities up to about 2 mm wide over the thighs and lower legs  -no chronic venous stasis changes or wounds    -SHADI  7/9/2020     R 1 26/151/95; triphasic at ankle, normal PPG/PVR  L  1 21/127/95, triphasic at ankle, normal PPG/PVR  Plan:  Patient presents for evaluation of leg pain and cramping at night  She is no symptoms suggestive of peripheral arterial disease  Her examination reveals normal 2+ femoral and DP pulses  Two years ago, she had ABIs performed which were normal with triphasic waveforms at the ankles  We discussed that symptoms of cramping would not be related to arterial disease  She does have small varicose veins a trial of compression stockings may be helpful with cramping  She is already on magnesium which she can increase for couple of weeks to see if that helps  Recommend conservative measures for leg pain and cramping  She would likely benefit from compression stockings  If she continues to have symptoms, we can consider further vascular work up  - Order for prescription graded compression stockings of 15-20 mm Hg  - Compression, low sodium diet and continue with regular exercise  - Patient education for venous insufficiency  - Follow up in 3 months for re-evaluation      Additional dx:  Mixed hyperlipidemia    Essential hypertension          Subjective:      Patient ID: Irma Mota is a 78 y o  female  Patient reports experiencing b/l leg pain w/ sleeping  Resolves w/ sitting up and moving  Presents today for further evaluation  HPI    Ms Irma Mota 78 F HTN, dyslipidemia who is previously seen for evaluation of peripheral arterial disease in 2018 and 2019  She now returns due to increased calf pain  Hx: Ms Asya Todd is quite active  She does a senior exercise class at the gym and she bicycles regularly without any leg pain/claudication discomfort  She reports that on more strenuous activities such as walking up a big hill, she can't keep up with her   She relates some limitations that hold her back a bit due to chest and blood pressure  In general, she completes all activities quite comfortably  She complains of occasional leg pain at night which wakes her up but quickly subsides  She also gets occasional flip horses at night but not regularly  No daily symptoms of tired, aching cramping or swelling legs  No numbness or tingling  She has no diabetes mellitus  She is a lifelong nonsmoker  10/25/22:  Ms Asya Todd returns for vascular re-evaluation  Patient reports increased bilateral leg pain waking her up during the night  She has both calf cramping and shin pain which occurs several times weekly  She has trace ankle swelling  She also has a small varicosities  She is been taking magnesium supplements for quite awhile  She continues to exercise regularly at the gym  She has no thigh or calf claudication with walking or biking which she does regularly  She is no foot pain  She denies tired, aching or heavy legs  She has not trialed medical compression  She is no known cardiovascular disease  She tells me that her cardiologist took her off of aspirin 81 mg  Sodium 137, potassium 4 3 chloride 105 CO2 28 BUN 20 creatinine 1 01; normal LFTs  No recent Mg++ level  The following portions of the patient's history were reviewed and updated as appropriate: allergies, current medications, past family history, past medical history, past social history, past surgical history and problem list     Review of Systems   Constitutional: Negative  HENT: Negative  Eyes: Negative  Respiratory: Negative  Cardiovascular: Negative  Gastrointestinal: Negative  Endocrine: Negative  Genitourinary: Negative  Musculoskeletal: Positive for back pain  Leg pain w/ sleeping   Skin: Negative  Allergic/Immunologic: Negative  Neurological: Negative  Hematological: Negative  Psychiatric/Behavioral: The patient is nervous/anxious  Objective:      /72 (BP Location: Right arm, Patient Position: Sitting)   Pulse 72   Temp 97 6 °F (36 4 °C) (Tympanic)   Ht 5' 2" (1 575 m)   Wt 57 6 kg (127 lb)   BMI 23 23 kg/m²     Bilateral smaller spider and reticular varicosities up to about 2 mm wide over the thighs and lower legs  Skin overall healthy and intact  Trace ankle edema  No stigmata of chronic venous stasis  2+ DP pulses  Physical Exam  Vitals and nursing note reviewed  Constitutional:       Appearance: She is well-developed  HENT:      Head: Normocephalic and atraumatic  Eyes:      Pupils: Pupils are equal, round, and reactive to light  Neck:      Thyroid: No thyromegaly  Vascular: No carotid bruit or JVD  Cardiovascular:      Rate and Rhythm: Normal rate and regular rhythm  Pulses:           Carotid pulses are 2+ on the right side and 2+ on the left side  Radial pulses are 2+ on the right side and 2+ on the left side  Femoral pulses are 2+ on the right side and 2+ on the left side  Dorsalis pedis pulses are 2+ on the right side and 2+ on the left side  Heart sounds: Normal heart sounds, S1 normal and S2 normal  No murmur heard  No friction rub  No gallop  Pulmonary:      Effort: Pulmonary effort is normal  No accessory muscle usage or respiratory distress  Breath sounds: Normal breath sounds  No wheezing or rales  Abdominal:      General: Bowel sounds are normal  There is no distension  Palpations: Abdomen is soft  Tenderness: There is no abdominal tenderness     Musculoskeletal:         General: No deformity  Normal range of motion  Cervical back: Neck supple  Right lower leg: Edema (trace) present  Left lower leg: Edema (trace) present  Skin:     General: Skin is warm and dry  Capillary Refill: Capillary refill takes less than 2 seconds  Findings: No lesion or rash  Nails: There is no clubbing  Neurological:      Mental Status: She is alert and oriented to person, place, and time  Comments: Grossly normal    Psychiatric:         Behavior: Behavior is cooperative  I have reviewed and made appropriate changes to the review of systems input by the medical assistant  Vitals:    10/25/22 1024   BP: 122/72   BP Location: Right arm   Patient Position: Sitting   Pulse: 72   Temp: 97 6 °F (36 4 °C)   TempSrc: Tympanic   Weight: 57 6 kg (127 lb)   Height: 5' 2" (1 575 m)       Patient Active Problem List   Diagnosis   • Other chest pain   • Essential hypertension   • PAD (peripheral artery disease) (HCC)   • Gross hematuria   • Anxiety disorder   • Hyperlipidemia   • Hypothyroidism   • Retroperitoneal mass   • History of colon polyps   • Major depressive disorder with single episode, in remission ([facility] [address] )   • Stage 3a chronic kidney disease (HCC)       Past Surgical History:   Procedure Laterality Date   • COLONOSCOPY     • COLONOSCOPY W/ POLYPECTOMY     • TX EDG US EXAM SURGICAL ALTER STOM DUODENUM/JEJUNUM N/A 3/14/2019    Procedure: LINEAR ENDOSCOPIC U/S;  Surgeon: Elsy Zaidi MD;  Location: [facility];   Service: Gastroenterology       Family History   Problem Relation Age of Onset   • Hyperlipidemia Mother    • Heart attack Father    • Other Sister         open heart surgery    • Heart murmur Sister    • Stroke Sister    • Hyperlipidemia Sister         all siblings    • Breast cancer Sister 79   • Parkinsonism Sister    • Cancer Sister    • Heart attack Brother         open heart surgery    • Deep vein thrombosis Brother    • Hyperlipidemia Brother    • Coronary artery disease Brother         stents placed    • Heart attack Paternal Aunt    • Heart attack Paternal Uncle    • Hypertension Family         all in the family both sides   • No Known Problems Daughter    • No Known Problems Maternal Grandmother    • No Known Problems Maternal Grandfather    • No Known Problems Paternal Grandmother    • No Known Problems Paternal Grandfather    • No Known Problems Sister    • No Known Problems Brother    • No Known Problems Son    • No Known Problems Son    • Anuerysm Neg Hx    • Heart failure Neg Hx        Social History     Socioeconomic History   • Marital status: /Civil Union     Spouse name: Not on file   • Number of children: Not on file   • Years of education: Not on file   • Highest education level:  Bachelor's degree (e g , BA, AB, BS)   Occupational History   • Occupation: retired   Tobacco Use   • Smoking status: Never Smoker   • Smokeless tobacco: Never Used   Vaping Use   • Vaping Use: Never used   Substance and Sexual Activity   • Alcohol use: Yes     Comment: social - rare    • Drug use: No   • Sexual activity: Yes     Partners: Male   Other Topics Concern   • Not on file   Social History Narrative    Most recent tobacco use screenin-      Do you currently or have you served in [employer]:   No      Were you activated, into active duty, as a member of the [employer] or as a Reservist:   No      Occupation:   homemaker      Marital status:         Sexual orientation:   Heterosexual      Exercise level:   Occasional      Diet:   Regular      Alcohol intake:   Occasional      Caffeine intake:   Occasional      Chewing tobacco:   none      Illicit drugs:   denies      Guns present in home:   No      Seat belts used routinely:   Yes      Sunscreen used routinely:   No      stays out of sun; allergic to sunscreen    Live alone or with others:   with others      Smoke alarm in home:   Yes      Advance directive:   No      Has the Patient had a mammogram to screen for breast cancer within 24 months:   Yes      Please enter the date of the Patient's previous mammogram :   08-      Sexually active:   Yes      Social Determinants of Health     Financial Resource Strain: Not on file   Food Insecurity: Not on file   Transportation Needs: Not on file   Physical Activity: Not on file   Stress: Not on file   Social Connections: Not on file   Intimate Partner Violence: Not on file   Housing Stability: Not on file       Allergies   Allergen Reactions   • Imdur [Isosorbide Nitrate] Headache   • Sulfa Antibiotics Hives   • Ciprofloxacin Rash and GI Intolerance   • Macrobid [Nitrofurantoin] Other (See Comments)     Pt does not recall reaction         Current Outpatient Medications:   •  amLODIPine (NORVASC) 5 mg tablet, Take 1 tablet (5 mg total) by mouth daily, Disp: 90 tablet, Rfl: 3  •  benazepril (LOTENSIN) 40 MG tablet, TAKE TWO TABLETS BY MOUTH IN THE early MORNING, Disp: 180 tablet, Rfl: 0  •  calcium-vitamin D 250-100 MG-UNIT per tablet, Take 1 tablet by mouth 2 (two) times a day, Disp: , Rfl:   •  Coenzyme Q10 (COQ-10) 100 MG CAPS, Take 1 capsule by mouth daily, Disp: , Rfl:   •  escitalopram (LEXAPRO) 10 mg tablet, TAKE ONE TABLET BY MOUTH AT BEDTIME, Disp: 90 tablet, Rfl: 0  •  ezetimibe (ZETIA) 10 mg tablet, TAKE ONE TABLET BY MOUTH ONCE DAILY, Disp: 90 tablet, Rfl: 0  •  Magnesium Oxide -Mg Supplement 400 MG CAPS, Take 1 capsule by mouth daily, Disp: , Rfl:   •  metoprolol succinate (TOPROL-XL) 25 mg 24 hr tablet, TAKE ONE TABLET BY MOUTH AT BEDTIME, Disp: 90 tablet, Rfl: 0  •  Omega-3 Fatty Acids (Omega-3 Fish Oil) 500 MG CAPS, Take by mouth, Disp: , Rfl:   •  Red Yeast Rice 600 MG CAPS, Take 2 capsules by mouth daily, Disp: , Rfl:   •  Aspirin (ASPIR-81 PO), Take 81 mg by mouth daily, Disp: , Rfl:   •  Multiple Vitamin (multivitamin) capsule, Take 1 capsule by mouth daily, Disp: , Rfl:

## 2022-10-25 NOTE — PATIENT INSTRUCTIONS
Leg pain and cramping  Varicose veins: small spider/ reticulars      Recommend conservative measures for leg pain and cramping  She would likely benefit from compression stockings  Compression stockings every day and remove at night  Order for prescription graded compression stockings of 15-20 mm Hg  Wear stocking EVERY day to help control swelling in legs and remove at night  Elevate legs while at rest  Continue healthy life-style changes; heart-healthy, low sodium diet; regular exercise for weight loss  Patient education for venous insufficiency    Follow up in 3 months

## 2022-11-07 ENCOUNTER — HOSPITAL ENCOUNTER (OUTPATIENT)
Dept: MAMMOGRAPHY | Facility: CLINIC | Age: 77
Discharge: HOME | End: 2022-11-07

## 2022-11-07 ENCOUNTER — HOSPITAL ENCOUNTER (OUTPATIENT)
Dept: ULTRASOUND IMAGING | Facility: CLINIC | Age: 77
Discharge: HOME | End: 2022-11-07

## 2022-11-07 DIAGNOSIS — N64.4 BREAST PAIN: ICD-10-CM

## 2022-11-21 ENCOUNTER — ANNUAL EXAM (OUTPATIENT)
Dept: OBGYN CLINIC | Facility: CLINIC | Age: 77
End: 2022-11-21

## 2022-11-21 VITALS
HEIGHT: 62 IN | SYSTOLIC BLOOD PRESSURE: 138 MMHG | BODY MASS INDEX: 23.59 KG/M2 | WEIGHT: 128.2 LBS | DIASTOLIC BLOOD PRESSURE: 66 MMHG

## 2022-11-21 DIAGNOSIS — Z01.419 WOMEN'S ANNUAL ROUTINE GYNECOLOGICAL EXAMINATION: Primary | ICD-10-CM

## 2022-11-21 NOTE — PROGRESS NOTES
Melly Rubio is a 78 y o  female who presents for annual well woman exam        Menstrual History:  OB History        3    Para   3    Term   3            AB        Living   3       SAB        IAB        Ectopic        Multiple        Live Births                      No LMP recorded  Patient is postmenopausal        The following portions of the patient's history were reviewed and updated as appropriate: allergies, current medications, past family history, past medical history, past social history, past surgical history and problem list     Review of Systems  Review of Systems   Constitutional: Negative for appetite change, chills, fatigue and fever  Respiratory: Negative for apnea, cough, chest tightness and shortness of breath  Cardiovascular: Negative for chest pain, palpitations and leg swelling  Gastrointestinal: Negative for abdominal pain, constipation, diarrhea, nausea and vomiting  Genitourinary: Negative for difficulty urinating, dysuria, flank pain, frequency, hematuria, urgency, vaginal bleeding, vaginal discharge and vaginal pain  Neurological: Negative for seizures, syncope, light-headedness, numbness and headaches  Psychiatric/Behavioral: Negative for agitation and confusion            Objective      /66 (BP Location: Left arm, Patient Position: Sitting, Cuff Size: Adult)   Ht 5' 2" (1 575 m)   Wt 58 2 kg (128 lb 3 2 oz)   BMI 23 45 kg/m²     Physical Exam  OBGyn Exam     General:   alert and oriented, in no acute distress, alert, appears stated age and cooperative   Heart: regular rate and rhythm, S1, S2 normal, no murmur, click, rub or gallop   Lungs: clear to auscultation bilaterally   Abdomen: soft, non-tender, without masses or organomegaly   Vulva: normal   Vagina: normal mucosa, vaginal atrophy   Cervix: no lesions   Uterus: normal size   Adnexa:  Breast Exam:  normal adnexa  breasts appear normal, no suspicious masses, no skin or nipple changes or axillary nodes  Assessment      @well woman@        62-year-old female   Annual exam  Breast pain stable mammogram and ultrasound reassuring  Chronic hypertension  Sister breast cancer  Prior 3 vaginal delivery   Osteoporosis decline DEXA scan and treatment  Plan  No Pap needed  Diet/exercise  Calcium/vitamin-D  Mammogram  Up-to-date with colonoscopy  Declined DEXA scan   Return to office for annual exam     All questions answered  There are no Patient Instructions on file for this visit

## 2022-11-30 DIAGNOSIS — I10 ESSENTIAL HYPERTENSION: ICD-10-CM

## 2022-11-30 RX ORDER — BENAZEPRIL HYDROCHLORIDE 40 MG/1
TABLET, FILM COATED ORAL
Qty: 180 TABLET | Refills: 0 | Status: SHIPPED | OUTPATIENT
Start: 2022-11-30

## 2022-11-30 RX ORDER — AMLODIPINE BESYLATE 5 MG/1
TABLET ORAL
Qty: 90 TABLET | Refills: 0 | Status: SHIPPED | OUTPATIENT
Start: 2022-11-30

## 2022-12-13 DIAGNOSIS — F32.5 MAJOR DEPRESSIVE DISORDER WITH SINGLE EPISODE, IN REMISSION (HCC): ICD-10-CM

## 2022-12-13 RX ORDER — ESCITALOPRAM OXALATE 10 MG/1
TABLET ORAL
Qty: 90 TABLET | Refills: 0 | Status: SHIPPED | OUTPATIENT
Start: 2022-12-13

## 2023-01-06 ENCOUNTER — RA CDI HCC (OUTPATIENT)
Dept: OTHER | Facility: HOSPITAL | Age: 78
End: 2023-01-06

## 2023-01-06 ENCOUNTER — APPOINTMENT (OUTPATIENT)
Dept: LAB | Facility: MEDICAL CENTER | Age: 78
End: 2023-01-06

## 2023-01-06 DIAGNOSIS — E78.2 MIXED HYPERLIPIDEMIA: ICD-10-CM

## 2023-01-06 DIAGNOSIS — I10 ESSENTIAL HYPERTENSION: ICD-10-CM

## 2023-01-06 LAB
ALBUMIN SERPL BCP-MCNC: 3.7 G/DL (ref 3.5–5)
ALP SERPL-CCNC: 63 U/L (ref 46–116)
ALT SERPL W P-5'-P-CCNC: 27 U/L (ref 12–78)
ANION GAP SERPL CALCULATED.3IONS-SCNC: 6 MMOL/L (ref 4–13)
AST SERPL W P-5'-P-CCNC: 22 U/L (ref 5–45)
BACTERIA UR QL AUTO: ABNORMAL /HPF
BASOPHILS # BLD AUTO: 0.04 THOUSANDS/ÂΜL (ref 0–0.1)
BASOPHILS NFR BLD AUTO: 1 % (ref 0–1)
BILIRUB SERPL-MCNC: 0.68 MG/DL (ref 0.2–1)
BILIRUB UR QL STRIP: NEGATIVE
BUN SERPL-MCNC: 21 MG/DL (ref 5–25)
CALCIUM SERPL-MCNC: 9.4 MG/DL (ref 8.3–10.1)
CHLORIDE SERPL-SCNC: 105 MMOL/L (ref 96–108)
CHOLEST SERPL-MCNC: 182 MG/DL
CLARITY UR: CLEAR
CO2 SERPL-SCNC: 27 MMOL/L (ref 21–32)
COLOR UR: ABNORMAL
CREAT SERPL-MCNC: 1.01 MG/DL (ref 0.6–1.3)
EOSINOPHIL # BLD AUTO: 0.23 THOUSAND/ÂΜL (ref 0–0.61)
EOSINOPHIL NFR BLD AUTO: 3 % (ref 0–6)
ERYTHROCYTE [DISTWIDTH] IN BLOOD BY AUTOMATED COUNT: 12.5 % (ref 11.6–15.1)
GFR SERPL CREATININE-BSD FRML MDRD: 54 ML/MIN/1.73SQ M
GLUCOSE P FAST SERPL-MCNC: 88 MG/DL (ref 65–99)
GLUCOSE UR STRIP-MCNC: NEGATIVE MG/DL
HCT VFR BLD AUTO: 39 % (ref 34.8–46.1)
HDLC SERPL-MCNC: 46 MG/DL
HGB BLD-MCNC: 12.5 G/DL (ref 11.5–15.4)
HGB UR QL STRIP.AUTO: ABNORMAL
HYALINE CASTS #/AREA URNS LPF: ABNORMAL /LPF
IMM GRANULOCYTES # BLD AUTO: 0.02 THOUSAND/UL (ref 0–0.2)
IMM GRANULOCYTES NFR BLD AUTO: 0 % (ref 0–2)
KETONES UR STRIP-MCNC: NEGATIVE MG/DL
LDLC SERPL CALC-MCNC: 113 MG/DL (ref 0–100)
LEUKOCYTE ESTERASE UR QL STRIP: NEGATIVE
LYMPHOCYTES # BLD AUTO: 3.17 THOUSANDS/ÂΜL (ref 0.6–4.47)
LYMPHOCYTES NFR BLD AUTO: 45 % (ref 14–44)
MAGNESIUM SERPL-MCNC: 2.2 MG/DL (ref 1.6–2.6)
MCH RBC QN AUTO: 31.3 PG (ref 26.8–34.3)
MCHC RBC AUTO-ENTMCNC: 32.1 G/DL (ref 31.4–37.4)
MCV RBC AUTO: 98 FL (ref 82–98)
MONOCYTES # BLD AUTO: 0.81 THOUSAND/ÂΜL (ref 0.17–1.22)
MONOCYTES NFR BLD AUTO: 11 % (ref 4–12)
MUCOUS THREADS UR QL AUTO: ABNORMAL
NEUTROPHILS # BLD AUTO: 2.82 THOUSANDS/ÂΜL (ref 1.85–7.62)
NEUTS SEG NFR BLD AUTO: 40 % (ref 43–75)
NITRITE UR QL STRIP: NEGATIVE
NON-SQ EPI CELLS URNS QL MICRO: ABNORMAL /HPF
NRBC BLD AUTO-RTO: 0 /100 WBCS
PH UR STRIP.AUTO: 7 [PH]
PLATELET # BLD AUTO: 268 THOUSANDS/UL (ref 149–390)
PMV BLD AUTO: 9.6 FL (ref 8.9–12.7)
POTASSIUM SERPL-SCNC: 4.2 MMOL/L (ref 3.5–5.3)
PROT SERPL-MCNC: 7.3 G/DL (ref 6.4–8.4)
PROT UR STRIP-MCNC: NEGATIVE MG/DL
RBC # BLD AUTO: 3.99 MILLION/UL (ref 3.81–5.12)
RBC #/AREA URNS AUTO: ABNORMAL /HPF
SODIUM SERPL-SCNC: 138 MMOL/L (ref 135–147)
SP GR UR STRIP.AUTO: 1.01 (ref 1–1.03)
TRIGL SERPL-MCNC: 116 MG/DL
URATE SERPL-MCNC: 3.8 MG/DL (ref 2–7.5)
UROBILINOGEN UR STRIP-ACNC: <2 MG/DL
WBC # BLD AUTO: 7.09 THOUSAND/UL (ref 4.31–10.16)
WBC #/AREA URNS AUTO: ABNORMAL /HPF

## 2023-01-06 NOTE — PROGRESS NOTES
[facility]  coding opportunities       Chart reviewed, no opportunity found:   [address]        Patients Insurance     Medicare Insurance: [employer]

## 2023-01-13 ENCOUNTER — OFFICE VISIT (OUTPATIENT)
Dept: FAMILY MEDICINE CLINIC | Facility: CLINIC | Age: 78
End: 2023-01-13

## 2023-01-13 VITALS
HEART RATE: 52 BPM | RESPIRATION RATE: 14 BRPM | DIASTOLIC BLOOD PRESSURE: 52 MMHG | OXYGEN SATURATION: 93 % | SYSTOLIC BLOOD PRESSURE: 118 MMHG | TEMPERATURE: 97.5 F | WEIGHT: 130 LBS | BODY MASS INDEX: 23.92 KG/M2 | HEIGHT: 62 IN

## 2023-01-13 DIAGNOSIS — I10 ESSENTIAL HYPERTENSION: Primary | ICD-10-CM

## 2023-01-13 DIAGNOSIS — F32.5 MAJOR DEPRESSIVE DISORDER WITH SINGLE EPISODE, IN REMISSION (HCC): ICD-10-CM

## 2023-01-13 DIAGNOSIS — E78.2 MIXED HYPERLIPIDEMIA: ICD-10-CM

## 2023-01-13 DIAGNOSIS — N18.31 STAGE 3A CHRONIC KIDNEY DISEASE (HCC): ICD-10-CM

## 2023-01-13 NOTE — ASSESSMENT & PLAN NOTE
Lab Results   Component Value Date    EGFR 54 01/06/2023    EGFR 54 06/30/2022    EGFR 59 07/06/2019    CREATININE 1 01 01/06/2023    CREATININE 1 01 06/30/2022    CREATININE 0 90 12/09/2021   Pt to avoid NSAIDs and any IV dyes  Patient to follow up eoither with nephrology or  with us for  further  monitoring of  renal function

## 2023-01-13 NOTE — PROGRESS NOTES
Name: Darin Cramer      :       MRN: [id number]  Encounter Provider: Chava Abdi MD  Encounter Date: 2023   Encounter department: [address] [place] [address] Place     1  Essential hypertension  Assessment & Plan:  Patient is stable with current anti-hypertensive medicine and continue to follow a low sodium diet and take current medication  All questions about this condition were answered today  Orders:  -     TSH, 3rd generation with Free T4 reflex; Future  -     Comprehensive metabolic panel; Future    2  Stage 3a chronic kidney disease [facility])  Assessment & Plan:  Lab Results   Component Value Date    EGFR 54 2023    EGFR 54 2022    EGFR 59 2019    CREATININE 1 01 2023    CREATININE 1 2022    CREATININE 0 90 2021   Pt to avoid NSAIDs and any IV dyes  Patient to follow up eoither with nephrology or  with us for  further  monitoring of  renal function  3  Mixed hyperlipidemia  Assessment & Plan:  Patient  is stable with current medication and we discussed a low fat low cholesterol diet  Weight loss also discussed for this will help lower cholesterol also  Recheck lipids in 6 months  Orders:  -     Lipid Panel with Direct LDL reflex; Future    4  Major depressive disorder with single episode, in remission [facility])  Assessment & Plan:  Patient to continue utilizing medical therapy as well and counseling sources as applicable for condition  If  suicidal thought or fear of suicide to contact 911 and seek help immediately  Meds reviewed and patient questions answered today          Depression Screening and Follow-up Plan: Patient assessed for underlying major depression  Brief counseling provided and recommend additional follow-up/re-evaluation next office visit  Patient advised to follow-up with PCP for further management  Falls Plan of Care: balance, strength, and gait training instructions were provided   Home safety education provided  Urinary Incontinence Plan of Care: counseling topics discussed: practice Kegel (pelvic floor strengthening) exercises, use restroom every 2 hours, limit alcohol, caffeine, spicy foods, and acidic foods, limiting fluid intake 3-4 hours before bed, weight loss, preventing constipation and limiting fluid intake to 60 oz  per day  Subjective     This is a 60-year-old female here today for checkup on multiple medical problems  Patient with hypertension hyperlipidemia some depression some anxiety and is here for her checkup  Patient is doing very well with her medications and does not need new refills  Patient had all of her lab work done and was reviewed with her today  Patient's cholesterol is doing very very good her liver functions are no problem her kidney function is very good and she has no problem with diabetes  Patient was to complains about feeling cold  Her thyroid was done last year and was normal discussed patient about getting a thyroid evaluation and we will do that for her next time  Had a long discussion with patient about COVID flu and RSV infections that are in the population right now  Review of Systems    Past Medical History:   Diagnosis Date   • Anxiety    • Colon polyps    • Essential hypertension 8/8/2018   • Gallbladder polyp    • Kidney cysts    • Kidney stones    • Lung disease      Past Surgical History:   Procedure Laterality Date   • COLONOSCOPY     • COLONOSCOPY W/ POLYPECTOMY     • [place] EDG US EXAM SURGICAL ALTER STOM DUODENUM/JEJUNUM N/A 3/14/2019    Procedure: LINEAR ENDOSCOPIC U/S;  Surgeon: Sandy Washburn MD;  Location: [facility];   Service: Gastroenterology     Family History   Problem Relation Age of Onset   • Hyperlipidemia Mother    • Heart attack Father    • Other Sister         open heart surgery    • Heart murmur Sister    • Stroke Sister    • Hyperlipidemia Sister         all siblings    • Breast cancer Sister 79   • Parkinsonism Sister    • Cancer Sister    • Heart attack Brother         open heart surgery    • Deep vein thrombosis Brother    • Hyperlipidemia Brother    • Coronary artery disease Brother         stents placed    • Heart attack Paternal Aunt    • Heart attack Paternal Uncle    • Hypertension Family         all in the family both sides   • No Known Problems Daughter    • No Known Problems Maternal Grandmother    • No Known Problems Maternal Grandfather    • No Known Problems Paternal Grandmother    • No Known Problems Paternal Grandfather    • No Known Problems Sister    • No Known Problems Brother    • No Known Problems Son    • No Known Problems Son    • Anuerysm Neg Hx    • Heart failure Neg Hx      Social History     Socioeconomic History   • Marital status: /Civil Union     Spouse name: None   • Number of children: None   • Years of education: None   • Highest education level:  Bachelor's degree (e g , BA, AB, BS)   Occupational History   • Occupation: retired   Tobacco Use   • Smoking status: Never   • Smokeless tobacco: Never   Vaping Use   • Vaping Use: Never used   Substance and Sexual Activity   • Alcohol use: Yes     Comment: social - rare    • Drug use: No   • Sexual activity: Not Currently     Partners: Male   Other Topics Concern   • None   Social History Narrative    Most recent tobacco use screenin-      Do you currently or have you served in [employer]:   No      Were you activated, into active duty, as a member of the [employer] or as a Reservist:   No      Occupation:   homemaker      Marital status:         Sexual orientation:   Heterosexual      Exercise level:   Occasional      Diet:   Regular      Alcohol intake:   Occasional      Caffeine intake:   Occasional      Chewing tobacco:   none      Illicit drugs:   denies      Guns present in home:   No      Seat belts used routinely:   Yes      Sunscreen used routinely:   No      stays out of sun; allergic to sunscreen Live alone or with others:   with others      Smoke alarm in home:   Yes      Advance directive:   No      Has the Patient had a mammogram to screen for breast cancer within 24 months:   Yes      Please enter the date of the Patient's previous mammogram :   08-      Sexually active:   Yes      Social Determinants of Health     Financial Resource Strain: Not on file   Food Insecurity: Not on file   Transportation Needs: Not on file   Physical Activity: Not on file   Stress: Not on file   Social Connections: Not on file   Intimate Partner Violence: Not on file   Housing Stability: Not on file     Current Outpatient Medications on File Prior to Visit   Medication Sig   • amLODIPine (NORVASC) 5 mg tablet TAKE ONE TABLET BY MOUTH ONCE DAILY   • benazepril (LOTENSIN) 40 MG tablet TAKE TWO TABLETS BY MOUTH IN THE EARLY MORNING   • calcium-vitamin D 250-100 MG-UNIT per tablet Take 1 tablet by mouth 2 (two) times a day   • Coenzyme Q10 (COQ-10) 100 MG CAPS Take 1 capsule by mouth daily   • escitalopram (LEXAPRO) 10 mg tablet TAKE ONE TABLET BY MOUTH AT BEDTIME   • ezetimibe (ZETIA) 10 mg tablet TAKE ONE TABLET BY MOUTH ONCE DAILY   • Magnesium Oxide -Mg Supplement 400 MG CAPS Take 1 capsule by mouth daily   • metoprolol succinate (TOPROL-XL) 25 mg 24 hr tablet TAKE ONE TABLET BY MOUTH AT BEDTIME   • Omega-3 Fatty Acids (Omega-3 Fish Oil) 500 MG CAPS Take by mouth   • Red Yeast Rice 600 MG CAPS Take 2 capsules by mouth daily   • [DISCONTINUED] Aspirin (ASPIR-81 PO) Take 81 mg by mouth daily (Patient not taking: Reported on 1/13/2023)   • [DISCONTINUED] Multiple Vitamin (multivitamin) capsule Take 1 capsule by mouth daily (Patient not taking: Reported on 1/13/2023)     Allergies   Allergen Reactions   • Imdur [Isosorbide Nitrate] Headache   • Sulfa Antibiotics Hives   • Ciprofloxacin Rash and GI Intolerance   • Macrobid [Nitrofurantoin] Other (See Comments)     Pt does not recall reaction     Immunization History Administered Date(s) Administered   • COVID-19 PFIZER VACCINE 0 3 ML IM 02/17/2021, 03/09/2021   • INFLUENZA 11/11/2014, 11/18/2016, 11/01/2018, 10/07/2019   • Influenza, high dose seasonal 0 7 mL 12/17/2021   • Pneumococcal Conjugate 13-Valent 11/11/2014   • Pneumococcal Polysaccharide PPV23 11/10/2015       Objective     /52 (BP Location: Left arm, Patient Position: Sitting, Cuff Size: Standard)   Pulse (!) 52   Temp 97 5 °F (36 4 °C) (Temporal)   Resp 14   Ht 5' 2" (1 575 m)   Wt 59 kg (130 lb)   SpO2 93%   BMI 23 78 kg/m²     Physical Exam  Karie Chavis MD

## 2023-01-19 DIAGNOSIS — I10 ESSENTIAL HYPERTENSION: ICD-10-CM

## 2023-01-19 RX ORDER — METOPROLOL SUCCINATE 25 MG/1
TABLET, EXTENDED RELEASE ORAL
Qty: 90 TABLET | Refills: 0 | Status: SHIPPED | OUTPATIENT
Start: 2023-01-19

## 2023-01-24 DIAGNOSIS — E78.5 HYPERLIPIDEMIA, UNSPECIFIED HYPERLIPIDEMIA TYPE: ICD-10-CM

## 2023-01-24 RX ORDER — EZETIMIBE 10 MG/1
TABLET ORAL
Qty: 90 TABLET | Refills: 0 | Status: SHIPPED | OUTPATIENT
Start: 2023-01-24

## 2023-02-27 DIAGNOSIS — I10 ESSENTIAL HYPERTENSION: ICD-10-CM

## 2023-02-27 RX ORDER — BENAZEPRIL HYDROCHLORIDE 40 MG/1
TABLET, FILM COATED ORAL
Qty: 180 TABLET | Refills: 0 | Status: SHIPPED | OUTPATIENT
Start: 2023-02-27

## 2023-02-27 RX ORDER — AMLODIPINE BESYLATE 5 MG/1
TABLET ORAL
Qty: 90 TABLET | Refills: 0 | Status: SHIPPED | OUTPATIENT
Start: 2023-02-27

## 2023-03-07 ENCOUNTER — APPOINTMENT (OUTPATIENT)
Dept: LAB | Facility: MEDICAL CENTER | Age: 78
End: 2023-03-07

## 2023-03-07 ENCOUNTER — OFFICE VISIT (OUTPATIENT)
Dept: FAMILY MEDICINE CLINIC | Facility: CLINIC | Age: 78
End: 2023-03-07

## 2023-03-07 VITALS
RESPIRATION RATE: 18 BRPM | BODY MASS INDEX: 24.11 KG/M2 | HEART RATE: 68 BPM | TEMPERATURE: 97.5 F | DIASTOLIC BLOOD PRESSURE: 58 MMHG | SYSTOLIC BLOOD PRESSURE: 120 MMHG | HEIGHT: 62 IN | OXYGEN SATURATION: 99 % | WEIGHT: 131 LBS

## 2023-03-07 DIAGNOSIS — R53.82 CHRONIC FATIGUE: Primary | ICD-10-CM

## 2023-03-07 DIAGNOSIS — R53.82 CHRONIC FATIGUE: ICD-10-CM

## 2023-03-07 DIAGNOSIS — L70.9 ADULT ACNE: ICD-10-CM

## 2023-03-07 LAB
BASOPHILS # BLD AUTO: 0.05 THOUSANDS/ÂΜL (ref 0–0.1)
BASOPHILS NFR BLD AUTO: 1 % (ref 0–1)
EOSINOPHIL # BLD AUTO: 0.12 THOUSAND/ÂΜL (ref 0–0.61)
EOSINOPHIL NFR BLD AUTO: 1 % (ref 0–6)
ERYTHROCYTE [DISTWIDTH] IN BLOOD BY AUTOMATED COUNT: 12.7 % (ref 11.6–15.1)
HCT VFR BLD AUTO: 39.5 % (ref 34.8–46.1)
HGB BLD-MCNC: 12.3 G/DL (ref 11.5–15.4)
IMM GRANULOCYTES # BLD AUTO: 0.02 THOUSAND/UL (ref 0–0.2)
IMM GRANULOCYTES NFR BLD AUTO: 0 % (ref 0–2)
LYMPHOCYTES # BLD AUTO: 2.67 THOUSANDS/ÂΜL (ref 0.6–4.47)
LYMPHOCYTES NFR BLD AUTO: 28 % (ref 14–44)
MCH RBC QN AUTO: 31.1 PG (ref 26.8–34.3)
MCHC RBC AUTO-ENTMCNC: 31.1 G/DL (ref 31.4–37.4)
MCV RBC AUTO: 100 FL (ref 82–98)
MONOCYTES # BLD AUTO: 0.77 THOUSAND/ÂΜL (ref 0.17–1.22)
MONOCYTES NFR BLD AUTO: 8 % (ref 4–12)
NEUTROPHILS # BLD AUTO: 6.01 THOUSANDS/ÂΜL (ref 1.85–7.62)
NEUTS SEG NFR BLD AUTO: 62 % (ref 43–75)
NRBC BLD AUTO-RTO: 0 /100 WBCS
PLATELET # BLD AUTO: 265 THOUSANDS/UL (ref 149–390)
PMV BLD AUTO: 9.8 FL (ref 8.9–12.7)
RBC # BLD AUTO: 3.96 MILLION/UL (ref 3.81–5.12)
TSH SERPL DL<=0.05 MIU/L-ACNC: 2.39 UIU/ML (ref 0.45–4.5)
WBC # BLD AUTO: 9.64 THOUSAND/UL (ref 4.31–10.16)

## 2023-03-07 RX ORDER — CLINDAMYCIN PHOSPHATE 10 MG/G
GEL TOPICAL 2 TIMES DAILY
Qty: 30 G | Refills: 0 | Status: SHIPPED | OUTPATIENT
Start: 2023-03-07

## 2023-03-07 RX ORDER — TOBRAMYCIN AND DEXAMETHASONE 3; 1 MG/ML; MG/ML
SUSPENSION/ DROPS OPHTHALMIC
COMMUNITY
Start: 2023-01-24

## 2023-03-07 NOTE — PROGRESS NOTES
Name: Rona Crouch      :       MRN: [id number]  Encounter Provider: DONALD Bar  Encounter Date: 3/7/2023   Encounter department: [address] [place] [address] Place     1  Chronic fatigue  -     CBC and differential; Future  -     TSH, 3rd generation with Free T4 reflex; Future    2  Adult acne  -     clindamycin (CLINDAGEL) 1 % gel; Apply topically 2 (two) times a day           Subjective      Patient has been experiencing extreme fatigue for the last 2 to 3 weeks  Is concern for possible hypothyroidism  She also is concerned about some adult acne possibly rosacea on her face  Denies any other related symptoms  Review of Systems   Constitutional: Positive for fatigue  Negative for appetite change and fever  HENT: Negative for sinus pressure and sore throat  Eyes: Negative for pain  Respiratory: Negative for shortness of breath  Cardiovascular: Negative for chest pain  Gastrointestinal: Negative for abdominal pain  Genitourinary: Negative for dysuria  Musculoskeletal: Negative for arthralgias and myalgias  Skin: Positive for rash (face)  Negative for color change  Neurological: Negative for light-headedness  Psychiatric/Behavioral: Negative for behavioral problems         Current Outpatient Medications on File Prior to Visit   Medication Sig   • amLODIPine (NORVASC) 5 mg tablet TAKE ONE TABLET BY MOUTH ONCE DAILY   • benazepril (LOTENSIN) 40 MG tablet TAKE TWO TABLETS BY MOUTH IN THE EARLY MORNING   • calcium-vitamin D 250-100 MG-UNIT per tablet Take 1 tablet by mouth 2 (two) times a day   • Coenzyme Q10 (COQ-10) 100 MG CAPS Take 1 capsule by mouth daily   • escitalopram (LEXAPRO) 10 mg tablet TAKE ONE TABLET BY MOUTH AT BEDTIME   • ezetimibe (ZETIA) 10 mg tablet TAKE ONE TABLET BY MOUTH ONCE DAILY   • Magnesium Oxide -Mg Supplement 400 MG CAPS Take 1 capsule by mouth daily   • metoprolol succinate (TOPROL-XL) 25 mg 24 hr tablet TAKE ONE TABLET BY MOUTH AT BEDTIME   • Omega-3 Fatty Acids (Omega-3 Fish Oil) 500 MG CAPS Take by mouth   • Red Yeast Rice 600 MG CAPS Take 2 capsules by mouth daily   • tobramycin-dexamethasone (TOBRADEX) ophthalmic suspension        Objective     /58 (BP Location: Left arm, Patient Position: Sitting, Cuff Size: Standard)   Pulse 68   Temp 97 5 °F (36 4 °C) (Temporal)   Resp 18   Ht 5' 2" (1 575 m)   Wt 59 4 kg (131 lb)   SpO2 99%   BMI 23 96 kg/m²     Physical Exam  Cardiovascular:      Rate and Rhythm: Normal rate and regular rhythm  Heart sounds: Normal heart sounds  Pulmonary:      Effort: Pulmonary effort is normal       Breath sounds: Normal breath sounds  Skin:     Findings: Erythema present  Neurological:      Mental Status: She is alert         DONALD Mike

## 2023-03-08 ENCOUNTER — TELEPHONE (OUTPATIENT)
Dept: VASCULAR SURGERY | Facility: CLINIC | Age: 78
End: 2023-03-08

## 2023-03-08 NOTE — TELEPHONE ENCOUNTER
Attempted to contact patient to schedule appointment(s) listed below  Requested patient call [phone] option 3 to schedule appointment(s)  Patient's appointment(s) are past due, expected ASAP      3 mon f/u eval symptoms *  Dopplers  [] Abdominal Aorta Iliac (AOIL)  [] Carotid (CV)   [] Celiac and/or Mesenteric  [] Endovascular Aortic Repair (EVAR)   [] Hemodialysis Access (HD)   [] Lower Limb Arterial (THAD)  [] Lower Limb Venous (LEV)  [] Lower Limb Venous Duplex with Reflux (LEVDR)  [] Renal Artery  [] Upper Limb Arterial (UEA)    [] Upper Limb Venous (UEV)              [] SHADI and Waveform analysis     Advanced Imaging   [] CTA head/neck    [] CTA abdomen    [] CTA abdomen & pelvis    [] CT abdomen with/ without contrast  [] CT abdomen with contrast  [] CT abdomen without contrast    [] CT abdomen & pelvis with/ without contrast  [] CT abdomen & pelvis with contrast  [] CT abdomen & pelvis without contrast    Office Visit   [] New patient, patient last seen over 3 years ago  [] Risk factor modification (RFM)   [x] Follow up   [] Lost to follow up (LTFU)

## 2023-03-13 DIAGNOSIS — F32.5 MAJOR DEPRESSIVE DISORDER WITH SINGLE EPISODE, IN REMISSION (HCC): ICD-10-CM

## 2023-03-13 RX ORDER — ESCITALOPRAM OXALATE 10 MG/1
TABLET ORAL
Qty: 90 TABLET | Refills: 0 | Status: SHIPPED | OUTPATIENT
Start: 2023-03-13

## 2023-03-15 ENCOUNTER — APPOINTMENT (OUTPATIENT)
Dept: RADIOLOGY | Facility: MEDICAL CENTER | Age: 78
End: 2023-03-15

## 2023-03-15 ENCOUNTER — OFFICE VISIT (OUTPATIENT)
Dept: FAMILY MEDICINE CLINIC | Facility: CLINIC | Age: 78
End: 2023-03-15

## 2023-03-15 VITALS
SYSTOLIC BLOOD PRESSURE: 120 MMHG | OXYGEN SATURATION: 99 % | HEART RATE: 74 BPM | RESPIRATION RATE: 18 BRPM | TEMPERATURE: 97.3 F | HEIGHT: 62 IN | WEIGHT: 130 LBS | DIASTOLIC BLOOD PRESSURE: 60 MMHG | BODY MASS INDEX: 23.92 KG/M2

## 2023-03-15 DIAGNOSIS — M79.604 PAIN OF RIGHT LOWER EXTREMITY: ICD-10-CM

## 2023-03-15 DIAGNOSIS — M79.604 PAIN OF RIGHT LOWER EXTREMITY: Primary | ICD-10-CM

## 2023-03-15 NOTE — PROGRESS NOTES
Name: Supriya Barry      :       MRN: [id number]  Encounter Provider: DONALD Davis  Encounter Date: 3/15/2023   Encounter department: [address] [place] [address] Place     1  Pain of right lower extremity  -     XR foot 3+ vw right; Future; Expected date: 03/15/2023    Physical assessment demonstrates possibly a bursa or bursitis at the base of her right foot at the junction of her great toe  Would like to get an x-ray just to rule out any acute processes  We will contact her with results when available  Until such time activity as tolerated Motrin or Tylenol for the discomfort  Subjective     This this morning that she has a lump on the bottom of her right foot just distal to her great toe  Mobile swelling does not appear to be infected no warmth just noticed  She denies any other related symptoms  Review of Systems   Musculoskeletal: Positive for arthralgias (Right foot discomfort ), joint swelling and myalgias  Past Medical History:   Diagnosis Date   • Anxiety    • Colon polyps    • Essential hypertension 2018   • Gallbladder polyp    • Kidney cysts    • Kidney stones    • Lung disease      Past Surgical History:   Procedure Laterality Date   • COLONOSCOPY     • COLONOSCOPY W/ POLYPECTOMY     • [place] EDG US EXAM SURGICAL ALTER STOM DUODENUM/JEJUNUM N/A 3/14/2019    Procedure: LINEAR ENDOSCOPIC U/S;  Surgeon: Nilsa Goodwin MD;  Location: [facility];   Service: Gastroenterology     Family History   Problem Relation Age of Onset   • Hyperlipidemia Mother    • Heart attack Father    • Other Sister         open heart surgery    • Heart murmur Sister    • Stroke Sister    • Hyperlipidemia Sister         all siblings    • Breast cancer Sister 79   • Parkinsonism Sister    • Cancer Sister    • Heart attack Brother         open heart surgery    • Deep vein thrombosis Brother    • Hyperlipidemia Brother    • Coronary artery disease Brother         stents placed    • Heart attack Paternal Aunt    • Heart attack Paternal Uncle    • Hypertension Family         all in the family both sides   • No Known Problems Daughter    • No Known Problems Maternal Grandmother    • No Known Problems Maternal Grandfather    • No Known Problems Paternal Grandmother    • No Known Problems Paternal Grandfather    • No Known Problems Sister    • No Known Problems Brother    • No Known Problems Son    • No Known Problems Son    • Anuerysm Neg Hx    • Heart failure Neg Hx      Social History     Socioeconomic History   • Marital status: /Civil Union     Spouse name: None   • Number of children: None   • Years of education: None   • Highest education level:  Bachelor's degree (e g , BA, AB, BS)   Occupational History   • Occupation: retired   Tobacco Use   • Smoking status: Never   • Smokeless tobacco: Never   Vaping Use   • Vaping Use: Never used   Substance and Sexual Activity   • Alcohol use: Yes     Comment: social - rare    • Drug use: No   • Sexual activity: Not Currently     Partners: Male   Other Topics Concern   • None   Social History Narrative    Most recent tobacco use screenin-      Do you currently or have you served in [employer]:   No      Were you activated, into active duty, as a member of the [employer] or as a Reservist:   No      Occupation:   homemaker      Marital status:         Sexual orientation:   Heterosexual      Exercise level:   Occasional      Diet:   Regular      Alcohol intake:   Occasional      Caffeine intake:   Occasional      Chewing tobacco:   none      Illicit drugs:   denies      Guns present in home:   No      Seat belts used routinely:   Yes      Sunscreen used routinely:   No      stays out of sun; allergic to sunscreen    Live alone or with others:   with others      Smoke alarm in home:   Yes      Advance directive:   No      Has the Patient had a mammogram to screen for breast cancer within 24 months: Yes      Please enter the date of the Patient's previous mammogram :   08-      Sexually active:   Yes      Social Determinants of Health     Financial Resource Strain: Not on file   Food Insecurity: Not on file   Transportation Needs: Not on file   Physical Activity: Not on file   Stress: Not on file   Social Connections: Not on file   Intimate Partner Violence: Not on file   Housing Stability: Not on file     Current Outpatient Medications on File Prior to Visit   Medication Sig   • amLODIPine (NORVASC) 5 mg tablet TAKE ONE TABLET BY MOUTH ONCE DAILY   • benazepril (LOTENSIN) 40 MG tablet TAKE TWO TABLETS BY MOUTH IN THE EARLY MORNING   • calcium-vitamin D 250-100 MG-UNIT per tablet Take 1 tablet by mouth 2 (two) times a day   • clindamycin (CLINDAGEL) 1 % gel Apply topically 2 (two) times a day   • Coenzyme Q10 (COQ-10) 100 MG CAPS Take 1 capsule by mouth daily   • escitalopram (LEXAPRO) 10 mg tablet TAKE ONE TABLET BY MOUTH AT BEDTIME   • ezetimibe (ZETIA) 10 mg tablet TAKE ONE TABLET BY MOUTH ONCE DAILY   • Magnesium Oxide -Mg Supplement 400 MG CAPS Take 1 capsule by mouth daily   • metoprolol succinate (TOPROL-XL) 25 mg 24 hr tablet TAKE ONE TABLET BY MOUTH AT BEDTIME   • Omega-3 Fatty Acids (Omega-3 Fish Oil) 500 MG CAPS Take by mouth   • Red Yeast Rice 600 MG CAPS Take 2 capsules by mouth daily   • tobramycin-dexamethasone (TOBRADEX) ophthalmic suspension      Allergies   Allergen Reactions   • Imdur [Isosorbide Nitrate] Headache   • Sulfa Antibiotics Hives   • Ciprofloxacin Rash and GI Intolerance   • Macrobid [Nitrofurantoin] Other (See Comments)     Pt does not recall reaction     Immunization History   Administered Date(s) Administered   • COVID-19 PFIZER VACCINE 0 3 ML IM 02/17/2021, 03/09/2021   • INFLUENZA 11/11/2014, 11/18/2016, 11/01/2018, 10/07/2019   • Influenza, high dose seasonal 0 7 mL 12/17/2021   • Pneumococcal Conjugate 13-Valent 11/11/2014   • Pneumococcal Polysaccharide PPV23 11/10/2015       Objective     /60 (BP Location: Left arm, Patient Position: Sitting, Cuff Size: Standard)   Pulse 74   Temp (!) 97 3 °F (36 3 °C) (Temporal)   Resp 18   Ht 5' 2" (1 575 m)   Wt 59 kg (130 lb)   SpO2 99%   BMI 23 78 kg/m²     Physical Exam  Musculoskeletal:         General: Swelling (Base of right foot at the junction of great toe ) and tenderness (Junction of great toe right foot appears to have a mobile cystlike growth no signs of infection uncomfortable to walk) present  Neurological:      General: No focal deficit present  Mental Status: She is alert and oriented to person, place, and time         DONALD Mike

## 2023-03-21 ENCOUNTER — OFFICE VISIT (OUTPATIENT)
Dept: VASCULAR SURGERY | Facility: CLINIC | Age: 78
End: 2023-03-21

## 2023-03-21 VITALS
BODY MASS INDEX: 23.74 KG/M2 | SYSTOLIC BLOOD PRESSURE: 122 MMHG | HEART RATE: 84 BPM | DIASTOLIC BLOOD PRESSURE: 80 MMHG | WEIGHT: 129 LBS | HEIGHT: 62 IN

## 2023-03-21 DIAGNOSIS — I83.813 VARICOSE VEINS OF BILATERAL LOWER EXTREMITIES WITH PAIN: Primary | ICD-10-CM

## 2023-03-21 NOTE — PATIENT INSTRUCTIONS
Smaller, reticular varicose veins in the thighs    -continue with compression, elevation and regular exercise  -call if increased leg pain or cramping

## 2023-03-21 NOTE — PROGRESS NOTES
Assessment/Plan:    Varicose veins of bilateral lower extremities with pain  -     Compression Stocking  -Difficulty tolerating compression sleeves  -Hx leg pain, nighttime calf cramping and mild edema    Exam:  -1-3 mm reticular veins over the thighs; veins are soft  -Overall skin is healthy and intact; no LE edema or stasis chagnes  -R foot / 1st MT plantar deformity - swelling and tender (refer to podiatry)      A/P: Bilateral leg pain and cramping with spider veins  We discussed options for compression  Since her symptoms are mild, perhaps standard, BTK 15 to 20 mm compression would be best   However, if her thigh varicosities become painful or problematic, she could wear a thigh-high compression  She education regarding varicose veins was provided  We will see her back as needed for reevaluation     - Compression, periodic elevation, low-sodium diet, regular exercise and skin care   - Patient education for venous insufficiency  - Follow up as needed for any worsening of symptoms - swelling, pain, fatigue, aching, heaviness  Subjective:      Patient ID: Eudora Eisenmenger is a 78 y o  female  Pt presents for eval of varicose veins  She wears compression  HPI     Ms Eudora Eisenmenger 78 F HTN, dyslipidemia, PAD and bilateral leg pain with nighttime cramping in the calves and trace ankle swelling  Hx: Ms Kev Carter is quite active  She does a senior exercise class at the gym and she bicycles regularly without any leg pain/claudication discomfort  She reports that on more strenuous activities such as walking up a big hill, she can't keep up with her   She relates some limitations that hold her back a bit due to chest and blood pressure  In general, she completes all activities quite comfortably  She complains of occasional leg pain at night which wakes her up but quickly subsides  She also gets occasional flip horses at night but not regularly   No daily symptoms of tired, aching cramping or swelling legs  No numbness or tingling  She has no diabetes mellitus  She is no known cardiovascular disease  She tells me that her cardiologist took her off of aspirin 81 mg  She is a lifelong nonsmoker  3/21/23:  Ms Socorro Costello returns for varicose vein follow up  She was seen 10/25/22 for leg/ calf cramping and was taking it easy  She was taking Mg and recommended for compression stockings  Patient reports that she would like to further discuss recommendations for compression  She is wearing OTC, below the knee compression, sleeves every day  However, she had to cut the sleeves at the top and bottom to help them fit better since she finds them to be too tight  After about 4 hours of wear, her legs become numb and she has to take them off  She also finds that she develops swelling in the distal thigh/level of the knee with compression  She also has a pair of size large, BTK [facility] stockings from her  (?12 mm Hg) which are much easier to get on and feel okay on her legs  In general, her nighttime cramps have improved using a pillow between her legs  She has no tired, aching, heavy or swelling legs  No major LE edema  The varicosities themselves are stable  We discussed options for compression  Since her symptoms are mild, perhaps standard, BTK 15 to 20 mm compression would be best   However, if her thigh varicosities become problematic, she could wear a thigh-high compression  Also, since she was last seen she developed a recent deformity to the right great toe with pain and swelling  She already discussed this with her family doctor  She should be seen by podiatry  The following portions of the patient's history were reviewed and updated as appropriate: allergies, current medications, past family history, past medical history, past social history, past surgical history and problem list     Review of Systems   Constitutional: Negative  HENT: Negative  Eyes: Negative  Respiratory: Negative  Cardiovascular: Negative  Gastrointestinal: Negative  Endocrine: Negative  Genitourinary: Negative  Musculoskeletal: Negative  Skin: Negative  Allergic/Immunologic: Negative  Neurological: Negative  Hematological: Negative  Psychiatric/Behavioral: Negative  Objective:      /80 (BP Location: Right arm, Patient Position: Sitting, Cuff Size: Standard)   Pulse 84   Ht 5' 2" (1 575 m)   Wt 58 5 kg (129 lb)   BMI 23 59 kg/m²     1-3 mm reticular veins over the thighs  Veins are soft  Overall skin is healthy and intact  No chronic stasis changes  No lower extremity edema    R foot / 1st MT plantar deformity - swelling and tender       Physical Exam  Vitals and nursing note reviewed  Constitutional:       Appearance: She is well-developed  HENT:      Head: Normocephalic and atraumatic  Eyes:      Pupils: Pupils are equal, round, and reactive to light  Neck:      Vascular: No JVD  Cardiovascular:      Rate and Rhythm: Normal rate and regular rhythm  Pulses:           Dorsalis pedis pulses are 1+ on the right side and 2+ on the left side  Pulmonary:      Effort: Pulmonary effort is normal  No accessory muscle usage  Musculoskeletal:         General: Normal range of motion  Cervical back: Neck supple  Right lower leg: No edema  Left lower leg: No edema  Skin:     General: Skin is warm and dry  Findings: No lesion or rash  Nails: There is no clubbing  Neurological:      Mental Status: She is alert and oriented to person, place, and time  Comments: Grossly normal    Psychiatric:         Behavior: Behavior is cooperative  I have reviewed and made appropriate changes to the review of systems input by the medical assistant      Vitals:    03/21/23 0923   BP: 122/80   BP Location: Right arm   Patient Position: Sitting   Cuff Size: Standard   Pulse: 84 Weight: 58 5 kg (129 lb)   Height: 5' 2" (1 575 m)       Patient Active Problem List   Diagnosis   • Other chest pain   • Essential hypertension   • PAD (peripheral artery disease) (HCC)   • Gross hematuria   • Anxiety disorder   • Hyperlipidemia   • Hypothyroidism   • Retroperitoneal mass   • History of colon polyps   • Major depressive disorder with single episode, in remission ([facility] [address] )   • Stage 3a chronic kidney disease (HCC)   • Varicose veins of bilateral lower extremities with pain       Past Surgical History:   Procedure Laterality Date   • COLONOSCOPY     • COLONOSCOPY W/ POLYPECTOMY     • [place] EDG US EXAM SURGICAL ALTER STOM DUODENUM/JEJUNUM N/A 3/14/2019    Procedure: LINEAR ENDOSCOPIC U/S;  Surgeon: Thao Cortez MD;  Location: [facility];   Service: Gastroenterology       Family History   Problem Relation Age of Onset   • Hyperlipidemia Mother    • Heart attack Father    • Other Sister         open heart surgery    • Heart murmur Sister    • Stroke Sister    • Hyperlipidemia Sister         all siblings    • Breast cancer Sister 79   • Parkinsonism Sister    • Cancer Sister    • Heart attack Brother         open heart surgery    • Deep vein thrombosis Brother    • Hyperlipidemia Brother    • Coronary artery disease Brother         stents placed    • Heart attack Paternal Aunt    • Heart attack Paternal Uncle    • Hypertension Family         all in the family both sides   • No Known Problems Daughter    • No Known Problems Maternal Grandmother    • No Known Problems Maternal Grandfather    • No Known Problems Paternal Grandmother    • No Known Problems Paternal Grandfather    • No Known Problems Sister    • No Known Problems Brother    • No Known Problems Son    • No Known Problems Son    • Anuerysm Neg Hx    • Heart failure Neg Hx        Social History     Socioeconomic History   • Marital status: /Civil Union     Spouse name: Not on file   • Number of children: Not on file   • Years of education: Not on file   • Highest education level:  Bachelor's degree (e jessie , BA, AB, BS)   Occupational History   • Occupation: retired   Tobacco Use   • Smoking status: Never   • Smokeless tobacco: Never   Vaping Use   • Vaping Use: Never used   Substance and Sexual Activity   • Alcohol use: Yes     Comment: social - rare    • Drug use: No   • Sexual activity: Not Currently     Partners: Male   Other Topics Concern   • Not on file   Social History Narrative    Most recent tobacco use screenin-      Do you currently or have you served in [employer]:   No      Were you activated, into active duty, as a member of the [employer] or as a Reservist:   No      Occupation:   homemaker      Marital status:         Sexual orientation:   Heterosexual      Exercise level:   Occasional      Diet:   Regular      Alcohol intake:   Occasional      Caffeine intake:   Occasional      Chewing tobacco:   none      Illicit drugs:   denies      Guns present in home:   No      Seat belts used routinely:   Yes      Sunscreen used routinely:   No      stays out of sun; allergic to sunscreen    Live alone or with others:   with others      Smoke alarm in home:   Yes      Advance directive:   No      Has the Patient had a mammogram to screen for breast cancer within 24 months:   Yes      Please enter the date of the Patient's previous mammogram :   08-      Sexually active:   Yes      Social Determinants of Health     Financial Resource Strain: Not on file   Food Insecurity: Not on file   Transportation Needs: Not on file   Physical Activity: Not on file   Stress: Not on file   Social Connections: Not on file   Intimate Partner Violence: Not on file   Housing Stability: Not on file       Allergies   Allergen Reactions   • Imdur [Isosorbide Nitrate] Headache   • Sulfa Antibiotics Hives   • Ciprofloxacin Rash and GI Intolerance   • Macrobid [Nitrofurantoin] Other (See Comments)     Pt does not recall reaction Current Outpatient Medications:   •  amLODIPine (NORVASC) 5 mg tablet, TAKE ONE TABLET BY MOUTH ONCE DAILY, Disp: 90 tablet, Rfl: 0  •  benazepril (LOTENSIN) 40 MG tablet, TAKE TWO TABLETS BY MOUTH IN THE EARLY MORNING, Disp: 180 tablet, Rfl: 0  •  calcium-vitamin D 250-100 MG-UNIT per tablet, Take 1 tablet by mouth 2 (two) times a day, Disp: , Rfl:   •  clindamycin (CLINDAGEL) 1 % gel, Apply topically 2 (two) times a day, Disp: 30 g, Rfl: 0  •  Coenzyme Q10 (COQ-10) 100 MG CAPS, Take 1 capsule by mouth daily, Disp: , Rfl:   •  escitalopram (LEXAPRO) 10 mg tablet, TAKE ONE TABLET BY MOUTH AT BEDTIME, Disp: 90 tablet, Rfl: 0  •  ezetimibe (ZETIA) 10 mg tablet, TAKE ONE TABLET BY MOUTH ONCE DAILY, Disp: 90 tablet, Rfl: 0  •  Magnesium Oxide -Mg Supplement 400 MG CAPS, Take 1 capsule by mouth daily, Disp: , Rfl:   •  metoprolol succinate (TOPROL-XL) 25 mg 24 hr tablet, TAKE ONE TABLET BY MOUTH AT BEDTIME, Disp: 90 tablet, Rfl: 0  •  Omega-3 Fatty Acids (Omega-3 Fish Oil) 500 MG CAPS, Take by mouth, Disp: , Rfl:   •  Red Yeast Rice 600 MG CAPS, Take 2 capsules by mouth daily, Disp: , Rfl:   •  tobramycin-dexamethasone (TOBRADEX) ophthalmic suspension, , Disp: , Rfl:     Current Facility-Administered Medications:   •  cyanocobalamin injection 1,000 mcg, 1,000 mcg, Intramuscular, Q30 Days, DONALD Collazo, 1,000 mcg at 03/09/23 0638

## 2023-03-29 ENCOUNTER — OFFICE VISIT (OUTPATIENT)
Dept: CARDIOLOGY CLINIC | Facility: MEDICAL CENTER | Age: 78
End: 2023-03-29

## 2023-03-29 VITALS
WEIGHT: 130 LBS | HEIGHT: 62 IN | SYSTOLIC BLOOD PRESSURE: 128 MMHG | HEART RATE: 84 BPM | OXYGEN SATURATION: 97 % | BODY MASS INDEX: 23.92 KG/M2 | DIASTOLIC BLOOD PRESSURE: 60 MMHG

## 2023-03-29 DIAGNOSIS — I10 ESSENTIAL HYPERTENSION: ICD-10-CM

## 2023-03-29 DIAGNOSIS — E78.2 MIXED HYPERLIPIDEMIA: ICD-10-CM

## 2023-03-29 DIAGNOSIS — R07.89 OTHER CHEST PAIN: Primary | ICD-10-CM

## 2023-03-29 NOTE — PROGRESS NOTES
Cardiology   Ingris Restrepo 78 y o  female MRN: [id number]        Impression:  1  Chest pain - no evidence of myocardial ischemia  No further episodes  2  Hypertension - continue current medications     3  Dyslipidemia - slightly elevated on Zetia  4  Moderate Bilateral LE PAD by vascular study - no PAD by vascular surgeon      Recommendations:  1  Continue current medications  2  Follow up in 12 months             HPI: Ingris Restrepo is a 78y o  year old female with hypertension and dyslipidemia who presents for follow up  [facility] pharmacologic stress test instead of an exercise stress test b/c of elevated blood pressure - no ischemia   No chest pain, shortness of breath, or palpitations  Does have some dyspnea with significant exertion  Has a L foot injury  Review of Systems   Constitutional: Negative  HENT: Negative  Eyes: Negative  Respiratory: Negative for chest tightness and shortness of breath  Cardiovascular: Negative for chest pain, palpitations and leg swelling  Gastrointestinal: Negative  Endocrine: Negative  Genitourinary: Negative  Musculoskeletal: Negative  Skin: Negative  Allergic/Immunologic: Negative  Neurological: Negative  Hematological: Negative  Psychiatric/Behavioral: Negative  All other systems reviewed and are negative  Past Medical History:   Diagnosis Date   • Anxiety    • Colon polyps    • Essential hypertension 8/8/2018   • Gallbladder polyp    • Kidney cysts    • Kidney stones    • Lung disease      Past Surgical History:   Procedure Laterality Date   • COLONOSCOPY     • COLONOSCOPY W/ POLYPECTOMY     • [place] EDG US EXAM SURGICAL ALTER STOM DUODENUM/JEJUNUM N/A 3/14/2019    Procedure: LINEAR ENDOSCOPIC U/S;  Surgeon: Mraybeth Garcia MD;  Location: [facility];   Service: Gastroenterology     Social History     Substance and Sexual Activity   Alcohol Use Yes    Comment: social - rare      Social History     Substance and Sexual Activity   Drug Use No     Social History     Tobacco Use   Smoking Status Never   Smokeless Tobacco Never     Family History   Problem Relation Age of Onset   • Hyperlipidemia Mother    • Heart attack Father    • Other Sister         open heart surgery    • Heart murmur Sister    • Stroke Sister    • Hyperlipidemia Sister         all siblings    • Breast cancer Sister 79   • Parkinsonism Sister    • Cancer Sister    • Heart attack Brother         open heart surgery    • Deep vein thrombosis Brother    • Hyperlipidemia Brother    • Coronary artery disease Brother         stents placed    • Heart attack Paternal Aunt    • Heart attack Paternal Uncle    • Hypertension Family         all in the family both sides   • No Known Problems Daughter    • No Known Problems Maternal Grandmother    • No Known Problems Maternal Grandfather    • No Known Problems Paternal Grandmother    • No Known Problems Paternal Grandfather    • No Known Problems Sister    • No Known Problems Brother    • No Known Problems Son    • No Known Problems Son    • Anuerysm Neg Hx    • Heart failure Neg Hx        Allergies:   Allergies   Allergen Reactions   • Imdur [Isosorbide Nitrate] Headache   • Sulfa Antibiotics Hives   • Ciprofloxacin Rash and GI Intolerance   • Macrobid [Nitrofurantoin] Other (See Comments)     Pt does not recall reaction       Medications:     Current Outpatient Medications:   •  amLODIPine (NORVASC) 5 mg tablet, TAKE ONE TABLET BY MOUTH ONCE DAILY, Disp: 90 tablet, Rfl: 0  •  benazepril (LOTENSIN) 40 MG tablet, TAKE TWO TABLETS BY MOUTH IN THE EARLY MORNING, Disp: 180 tablet, Rfl: 0  •  calcium-vitamin D 250-100 MG-UNIT per tablet, Take 1 tablet by mouth 2 (two) times a day, Disp: , Rfl:   •  clindamycin (CLINDAGEL) 1 % gel, Apply topically 2 (two) times a day, Disp: 30 g, Rfl: 0  •  Coenzyme Q10 (COQ-10) 100 MG CAPS, Take 1 capsule by mouth daily, Disp: , Rfl:   •  escitalopram (LEXAPRO) 10 mg tablet, TAKE ONE TABLET BY MOUTH AT BEDTIME, Disp: 90 tablet, Rfl: 0  •  ezetimibe (ZETIA) 10 mg tablet, TAKE ONE TABLET BY MOUTH ONCE DAILY, Disp: 90 tablet, Rfl: 0  •  Magnesium Oxide -Mg Supplement 400 MG CAPS, Take 1 capsule by mouth daily, Disp: , Rfl:   •  metoprolol succinate (TOPROL-XL) 25 mg 24 hr tablet, TAKE ONE TABLET BY MOUTH AT BEDTIME, Disp: 90 tablet, Rfl: 0  •  Omega-3 Fatty Acids (Omega-3 Fish Oil) 500 MG CAPS, Take by mouth, Disp: , Rfl:   •  Red Yeast Rice 600 MG CAPS, Take 2 capsules by mouth daily, Disp: , Rfl:   •  tobramycin-dexamethasone (TOBRADEX) ophthalmic suspension, , Disp: , Rfl:     Current Facility-Administered Medications:   •  cyanocobalamin injection 1,000 mcg, 1,000 mcg, Intramuscular, Q30 Days, Nils Yates, CRNP, 1,000 mcg at 03/09/23 0902       Wt Readings from Last 3 Encounters:   03/29/23 59 kg (130 lb)   03/21/23 58 5 kg (129 lb)   03/15/23 59 kg (130 lb)     Temp Readings from Last 3 Encounters:   03/15/23 (!) 97 3 °F (36 3 °C) (Temporal)   03/07/23 97 5 °F (36 4 °C) (Temporal)   01/13/23 97 5 °F (36 4 °C) (Temporal)     BP Readings from Last 3 Encounters:   03/29/23 128/60   03/21/23 122/80   03/15/23 120/60     Pulse Readings from Last 3 Encounters:   03/29/23 84   03/21/23 84   03/15/23 74         Physical Exam  HENT:      Head: Atraumatic  Mouth/Throat:      Mouth: Mucous membranes are moist    Eyes:      Extraocular Movements: Extraocular movements intact  Cardiovascular:      Rate and Rhythm: Normal rate and regular rhythm  Heart sounds: Normal heart sounds  Pulmonary:      Effort: Pulmonary effort is normal       Breath sounds: Normal breath sounds  Abdominal:      General: Abdomen is flat  Musculoskeletal:         General: Normal range of motion  Cervical back: Normal range of motion  Skin:     General: Skin is warm  Neurological:      General: No focal deficit present  Mental Status: She is alert and oriented to person, place, and time     Psychiatric:         Mood and Affect: Mood normal          Behavior: Behavior normal            Laboratory Studies:  CMP:  Lab Results   Component Value Date    K 4 2 01/06/2023     01/06/2023    CO2 27 01/06/2023    BUN 21 01/06/2023    CREATININE 1 01 01/06/2023    AST 22 01/06/2023    ALT 27 01/06/2023    EGFR 54 01/06/2023       Lipid Profile:   No results found for: CHOL  Lab Results   Component Value Date    HDL 46 (L) 01/06/2023     Lab Results   Component Value Date    LDLCALC 113 (H) 01/06/2023     Lab Results   Component Value Date    TRIG 116 01/06/2023       Cardiac testing:   EKG reviewed personally:   No results found for this or any previous visit  No results found for this or any previous visit  No results found for this or any previous visit  No results found for this or any previous visit

## 2023-05-02 DIAGNOSIS — I10 ESSENTIAL HYPERTENSION: ICD-10-CM

## 2023-05-03 RX ORDER — BENAZEPRIL HYDROCHLORIDE 40 MG/1
TABLET, FILM COATED ORAL
Qty: 180 TABLET | Refills: 0 | Status: SHIPPED | OUTPATIENT
Start: 2023-05-03

## 2023-05-11 ENCOUNTER — CLINICAL SUPPORT (OUTPATIENT)
Dept: FAMILY MEDICINE CLINIC | Facility: CLINIC | Age: 78
End: 2023-05-11

## 2023-05-11 DIAGNOSIS — D51.0 PERNICIOUS ANEMIA: Primary | ICD-10-CM

## 2023-05-11 RX ADMIN — CYANOCOBALAMIN 1000 MCG: 1000 INJECTION, SOLUTION INTRAMUSCULAR; SUBCUTANEOUS at 08:56

## 2023-05-24 DIAGNOSIS — I10 ESSENTIAL HYPERTENSION: ICD-10-CM

## 2023-05-24 RX ORDER — AMLODIPINE BESYLATE 5 MG/1
TABLET ORAL
Qty: 90 TABLET | Refills: 0 | Status: SHIPPED | OUTPATIENT
Start: 2023-05-24

## 2023-06-09 DIAGNOSIS — F32.5 MAJOR DEPRESSIVE DISORDER WITH SINGLE EPISODE, IN REMISSION (HCC): ICD-10-CM

## 2023-06-09 RX ORDER — ESCITALOPRAM OXALATE 10 MG/1
TABLET ORAL
Qty: 90 TABLET | Refills: 0 | Status: SHIPPED | OUTPATIENT
Start: 2023-06-09

## 2023-06-12 ENCOUNTER — CLINICAL SUPPORT (OUTPATIENT)
Dept: FAMILY MEDICINE CLINIC | Facility: CLINIC | Age: 78
End: 2023-06-12
Payer: COMMERCIAL

## 2023-06-12 DIAGNOSIS — D51.0 PERNICIOUS ANEMIA: Primary | ICD-10-CM

## 2023-06-12 PROCEDURE — 96372 THER/PROPH/DIAG INJ SC/IM: CPT | Performed by: NURSE PRACTITIONER

## 2023-06-12 RX ADMIN — CYANOCOBALAMIN 1000 MCG: 1000 INJECTION, SOLUTION INTRAMUSCULAR; SUBCUTANEOUS at 13:12

## 2023-07-06 ENCOUNTER — APPOINTMENT (OUTPATIENT)
Dept: LAB | Facility: MEDICAL CENTER | Age: 78
End: 2023-07-06
Payer: COMMERCIAL

## 2023-07-06 DIAGNOSIS — I10 ESSENTIAL HYPERTENSION: ICD-10-CM

## 2023-07-06 DIAGNOSIS — E78.2 MIXED HYPERLIPIDEMIA: ICD-10-CM

## 2023-07-06 LAB
ALBUMIN SERPL BCP-MCNC: 3.5 G/DL (ref 3.5–5)
ALP SERPL-CCNC: 52 U/L (ref 46–116)
ALT SERPL W P-5'-P-CCNC: 21 U/L (ref 12–78)
ANION GAP SERPL CALCULATED.3IONS-SCNC: 4 MMOL/L
AST SERPL W P-5'-P-CCNC: 22 U/L (ref 5–45)
BILIRUB SERPL-MCNC: 0.47 MG/DL (ref 0.2–1)
BUN SERPL-MCNC: 19 MG/DL (ref 5–25)
CALCIUM SERPL-MCNC: 9.3 MG/DL (ref 8.3–10.1)
CHLORIDE SERPL-SCNC: 110 MMOL/L (ref 96–108)
CHOLEST SERPL-MCNC: 145 MG/DL
CO2 SERPL-SCNC: 24 MMOL/L (ref 21–32)
CREAT SERPL-MCNC: 1.01 MG/DL (ref 0.6–1.3)
GFR SERPL CREATININE-BSD FRML MDRD: 54 ML/MIN/1.73SQ M
GLUCOSE P FAST SERPL-MCNC: 86 MG/DL (ref 65–99)
HDLC SERPL-MCNC: 42 MG/DL
LDLC SERPL CALC-MCNC: 85 MG/DL (ref 0–100)
POTASSIUM SERPL-SCNC: 4.1 MMOL/L (ref 3.5–5.3)
PROT SERPL-MCNC: 7.2 G/DL (ref 6.4–8.4)
SODIUM SERPL-SCNC: 138 MMOL/L (ref 135–147)
TRIGL SERPL-MCNC: 90 MG/DL
TSH SERPL DL<=0.05 MIU/L-ACNC: 3.11 UIU/ML (ref 0.45–4.5)

## 2023-07-06 PROCEDURE — 80053 COMPREHEN METABOLIC PANEL: CPT

## 2023-07-06 PROCEDURE — 84443 ASSAY THYROID STIM HORMONE: CPT

## 2023-07-06 PROCEDURE — 36415 COLL VENOUS BLD VENIPUNCTURE: CPT

## 2023-07-06 PROCEDURE — 80061 LIPID PANEL: CPT

## 2023-07-13 DIAGNOSIS — E78.5 HYPERLIPIDEMIA, UNSPECIFIED HYPERLIPIDEMIA TYPE: ICD-10-CM

## 2023-07-13 DIAGNOSIS — I10 ESSENTIAL HYPERTENSION: ICD-10-CM

## 2023-07-13 RX ORDER — METOPROLOL SUCCINATE 25 MG/1
TABLET, EXTENDED RELEASE ORAL
Qty: 90 TABLET | Refills: 0 | Status: SHIPPED | OUTPATIENT
Start: 2023-07-13

## 2023-07-13 RX ORDER — EZETIMIBE 10 MG/1
TABLET ORAL
Qty: 90 TABLET | Refills: 0 | Status: SHIPPED | OUTPATIENT
Start: 2023-07-13

## 2023-07-14 ENCOUNTER — OFFICE VISIT (OUTPATIENT)
Dept: FAMILY MEDICINE CLINIC | Facility: CLINIC | Age: 78
End: 2023-07-14
Payer: COMMERCIAL

## 2023-07-14 VITALS
HEART RATE: 67 BPM | BODY MASS INDEX: 23.92 KG/M2 | WEIGHT: 130 LBS | OXYGEN SATURATION: 96 % | DIASTOLIC BLOOD PRESSURE: 68 MMHG | HEIGHT: 62 IN | SYSTOLIC BLOOD PRESSURE: 124 MMHG | TEMPERATURE: 97.3 F

## 2023-07-14 DIAGNOSIS — I10 ESSENTIAL HYPERTENSION: ICD-10-CM

## 2023-07-14 DIAGNOSIS — D51.0 PERNICIOUS ANEMIA: ICD-10-CM

## 2023-07-14 DIAGNOSIS — E78.2 MIXED HYPERLIPIDEMIA: ICD-10-CM

## 2023-07-14 DIAGNOSIS — F41.9 ANXIETY DISORDER, UNSPECIFIED TYPE: ICD-10-CM

## 2023-07-14 DIAGNOSIS — Z78.0 MENOPAUSE: ICD-10-CM

## 2023-07-14 DIAGNOSIS — N18.31 STAGE 3A CHRONIC KIDNEY DISEASE (HCC): ICD-10-CM

## 2023-07-14 DIAGNOSIS — I73.9 PAD (PERIPHERAL ARTERY DISEASE) (HCC): ICD-10-CM

## 2023-07-14 DIAGNOSIS — F32.5 MAJOR DEPRESSIVE DISORDER WITH SINGLE EPISODE, IN REMISSION (HCC): ICD-10-CM

## 2023-07-14 DIAGNOSIS — Z12.31 SCREENING MAMMOGRAM FOR HIGH-RISK PATIENT: ICD-10-CM

## 2023-07-14 DIAGNOSIS — Z00.00 WELL ADULT EXAM: Primary | ICD-10-CM

## 2023-07-14 PROCEDURE — 99214 OFFICE O/P EST MOD 30 MIN: CPT

## 2023-07-14 PROCEDURE — 96372 THER/PROPH/DIAG INJ SC/IM: CPT

## 2023-07-14 PROCEDURE — G0439 PPPS, SUBSEQ VISIT: HCPCS

## 2023-07-14 RX ORDER — CYANOCOBALAMIN 1000 UG/ML
1000 INJECTION, SOLUTION INTRAMUSCULAR; SUBCUTANEOUS
Status: SHIPPED | OUTPATIENT
Start: 2023-07-14

## 2023-07-14 RX ADMIN — CYANOCOBALAMIN 1000 MCG: 1000 INJECTION, SOLUTION INTRAMUSCULAR; SUBCUTANEOUS at 09:39

## 2023-07-14 NOTE — PROGRESS NOTES
Assessment and Plan:     Problem List Items Addressed This Visit        Cardiovascular and Mediastinum    Essential hypertension     Patient is stable with current anti-hypertensive medicine and continue to follow a low sodium diet and take current medication. All questions about this condition were answered today. Relevant Orders    Comprehensive metabolic panel    CBC and differential    Magnesium    Uric acid    Urinalysis with microscopic    TSH, 3rd generation with Free T4 reflex    PAD (peripheral artery disease) ([address])     Patient is stable  and will continue present plan of care and reassess at next routine visit. All questions about this problem from patient were answered today. Genitourinary    Stage 3a chronic kidney disease [facility])     Lab Results   Component Value Date    EGFR 54 07/06/2023    EGFR 54 01/06/2023    EGFR 54 06/30/2022    CREATININE 1.01 07/06/2023    CREATININE 1.01 01/06/2023    CREATININE 1.01 06/30/2022   Pt to avoid NSAIDs and any IV dyes. Patient to follow up eoither with nephrology or  with us for  further  monitoring of  renal function. Other    Anxiety disorder     Patient to continue utilizing medical therapy as well as counseling sources as applicable to condition. If suicidal thought or fear of suicide attempt, to call 911 and seek help immediately. Medications and therapy reviewed today and all patient  questions answered today. Hyperlipidemia     Patient  is stable with current medication and we discussed a low fat low cholesterol diet. Weight loss also discussed for this will help lower cholesterol also. Recheck lipids in 6 months. Relevant Orders    Lipid Panel with Direct LDL reflex    Major depressive disorder with single episode, in remission [facility])     Patient to continue utilizing medical therapy as well and counseling sources as applicable for condition.  If  suicidal thought or fear of suicide to contact 911 and seek help immediately. Meds reviewed and patient questions answered today        Other Visit Diagnoses     Well adult exam    -  Primary    Pernicious anemia        Relevant Medications    Cyanocobalamin (VITAMIN B12 PO)    cyanocobalamin injection 1,000 mcg (Start on 7/14/2023  9:30 AM)    Screening mammogram for high-risk patient        Relevant Orders    Mammo screening bilateral w 3d & cad    Menopause        Relevant Orders    DXA bone density spine hip and pelvis           Preventive health issues were discussed with patient, and age appropriate screening tests were ordered as noted in patient's After Visit Summary. Personalized health advice and appropriate referrals for health education or preventive services given if needed, as noted in patient's After Visit Summary.      History of Present Illness:     Patient presents for a Medicare Wellness Visit    HPI   Patient Care Team:  Robert Petty MD as PCP - General (Family Medicine)  MD Robert Black MD Terresa Dee, MD (Oncology)  Teryl Primrose, PA-C (Vascular Surgery)  Mary Russell MD (Gastroenterology)     Review of Systems:     Review of Systems     Problem List:     Patient Active Problem List   Diagnosis   • Other chest pain   • Essential hypertension   • PAD (peripheral artery disease) ([address])   • Gross hematuria   • Anxiety disorder   • Hyperlipidemia   • Retroperitoneal mass   • History of colon polyps   • Major depressive disorder with single episode, in remission [facility])   • Stage 3a chronic kidney disease ([address])   • Varicose veins of bilateral lower extremities with pain      Past Medical and Surgical History:     Past Medical History:   Diagnosis Date   • Anxiety    • Colon polyps    • Essential hypertension 8/8/2018   • Gallbladder polyp    • Kidney cysts    • Kidney stones    • Lung disease      Past Surgical History:   Procedure Laterality Date   • COLONOSCOPY     • COLONOSCOPY W/ POLYPECTOMY     • NE EDG US EXAM SURGICAL ALTER STOM DUODENUM/JEJUNUM N/A 3/14/2019    Procedure: LINEAR ENDOSCOPIC U/S;  Surgeon: Naveen Ramirez MD;  Location:  GI LAB; Service: Gastroenterology      Family History:     Family History   Problem Relation Age of Onset   • Hyperlipidemia Mother    • Heart attack Father    • Other Sister         open heart surgery    • Heart murmur Sister    • Stroke Sister    • Hyperlipidemia Sister         all siblings    • Breast cancer Sister 79   • Parkinsonism Sister    • Cancer Sister    • Heart attack Brother         open heart surgery    • Deep vein thrombosis Brother    • Hyperlipidemia Brother    • Coronary artery disease Brother         stents placed    • Heart attack Paternal Aunt    • Heart attack Paternal Uncle    • Hypertension Family         all in the family both sides   • No Known Problems Daughter    • No Known Problems Maternal Grandmother    • No Known Problems Maternal Grandfather    • No Known Problems Paternal Grandmother    • No Known Problems Paternal Grandfather    • No Known Problems Sister    • No Known Problems Brother    • No Known Problems Son    • No Known Problems Son    • Anuerysm Neg Hx    • Heart failure Neg Hx       Social History:     Social History     Socioeconomic History   • Marital status: /Civil Union     Spouse name: None   • Number of children: None   • Years of education: None   • Highest education level:  Bachelor's degree (e.g., BA, AB, BS)   Occupational History   • Occupation: retired   Tobacco Use   • Smoking status: Never   • Smokeless tobacco: Never   Vaping Use   • Vaping Use: Never used   Substance and Sexual Activity   • Alcohol use: Yes     Comment: social - rare    • Drug use: No   • Sexual activity: Not Currently     Partners: Male   Other Topics Concern   • None   Social History Narrative    Most recent tobacco use screenin-      Do you currently or have you served in the [employer]:   No      Were you activated, into active duty, as a member of the [employer] or as a Reservist:   No      Occupation:   homemaker      Marital status:         Sexual orientation:   Heterosexual      Exercise level:   Occasional      Diet:   Regular      Alcohol intake:   Occasional      Caffeine intake:   Occasional      Chewing tobacco:   none      Illicit drugs:   denies      Guns present in home:   No      Seat belts used routinely:   Yes      Sunscreen used routinely:   No      stays out of sun; allergic to sunscreen    Live alone or with others:   with others      Smoke alarm in home:   Yes      Advance directive:   No      Has the Patient had a mammogram to screen for breast cancer within 24 months:   Yes      Please enter the date of the Patient's previous mammogram.:   08-      Sexually active:   Yes      Social Determinants of Health     Financial Resource Strain: Low Risk  (7/14/2023)    Overall Financial Resource Strain (CARDIA)    • Difficulty of Paying Living Expenses: Not hard at all   Food Insecurity: Not on file   Transportation Needs: No Transportation Needs (7/14/2023)    PRAPARE - Transportation    • Lack of Transportation (Medical): No    • Lack of Transportation (Non-Medical):  No   Physical Activity: Not on file   Stress: Not on file   Social Connections: Not on file   Intimate Partner Violence: Not on file   Housing Stability: Not on file      Medications and Allergies:     Current Outpatient Medications   Medication Sig Dispense Refill   • amLODIPine (NORVASC) 5 mg tablet TAKE ONE TABLET BY MOUTH ONCE DAILY 90 tablet 0   • benazepril (LOTENSIN) 40 MG tablet TAKE TWO TABLETS BY MOUTH IN THE EARLY MORNING 180 tablet 0   • calcium-vitamin D 250-100 MG-UNIT per tablet Take 1 tablet by mouth 2 (two) times a day     • clindamycin (CLINDAGEL) 1 % gel Apply topically 2 (two) times a day 30 g 0   • Coenzyme Q10 (COQ-10) 100 MG CAPS Take 1 capsule by mouth daily     • Cyanocobalamin (VITAMIN B12 PO) Take by mouth     • escitalopram (LEXAPRO) 10 mg tablet TAKE ONE TABLET BY MOUTH AT BEDTIME 90 tablet 0   • ezetimibe (ZETIA) 10 mg tablet TAKE ONE TABLET BY MOUTH ONCE DAILY 90 tablet 0   • Magnesium Oxide -Mg Supplement 400 MG CAPS Take 1 capsule by mouth daily     • metoprolol succinate (TOPROL-XL) 25 mg 24 hr tablet TAKE ONE TABLET BY MOUTH AT BEDTIME 90 tablet 0   • Omega-3 Fatty Acids (Omega-3 Fish Oil) 500 MG CAPS Take by mouth     • Plant Sterols and Stanols (CHOLESTOFF PO) Take by mouth     • Red Yeast Rice 600 MG CAPS Take 2 capsules by mouth daily       Current Facility-Administered Medications   Medication Dose Route Frequency Provider Last Rate Last Admin   • cyanocobalamin injection 1,000 mcg  1,000 mcg Intramuscular Q30 Days Cesilia GriggszarineJASONNP   1,000 mcg at 06/12/23 1312   • cyanocobalamin injection 1,000 mcg  1,000 mcg Intramuscular Q30 Days Chuck Armenta MD         Allergies   Allergen Reactions   • Imdur [Isosorbide Nitrate] Headache   • Sulfa Antibiotics Hives   • Ciprofloxacin Rash and GI Intolerance   • Macrobid [Nitrofurantoin] Other (See Comments)     Pt does not recall reaction      Immunizations:     Immunization History   Administered Date(s) Administered   • COVID-19 PFIZER VACCINE 0.3 ML IM 02/17/2021, 03/09/2021   • INFLUENZA 11/11/2014, 11/18/2016, 11/01/2018, 10/07/2019   • Influenza, high dose seasonal 0.7 mL 12/17/2021   • Pneumococcal Conjugate 13-Valent 11/11/2014   • Pneumococcal Polysaccharide PPV23 11/10/2015      Health Maintenance:         Topic Date Due   • Breast Cancer Screening: Mammogram  11/07/2023   • Colorectal Cancer Screening  10/23/2025   • Hepatitis C Screening  Completed         Topic Date Due   • COVID-19 Vaccine (3 - Pfizer series) 05/04/2021   • Influenza Vaccine (1) 09/01/2023      Medicare Screening Tests and Risk Assessments:     Mariya Ceballos is here for her Subsequent Wellness visit. Health Risk Assessment:   Patient rates overall health as very good.  Patient feels that their physical health rating is same. Aura Brew was rated as same. Hearing was rated as same. Patient feels that their emotional and mental health rating is slightly better. Patients states they are never, rarely angry. Patient states they are sometimes unusually tired/fatigued. Pain experienced in the last 7 days has been some. Patient's pain rating has been 5/10. Patient states that she has experienced no weight loss or gain in last 6 months. Fall Risk Screening: In the past year, patient has experienced: no history of falling in past year      Urinary Incontinence Screening:   Patient has leaked urine accidently in the last six months. Home Safety:  Patient does not have trouble with stairs inside or outside of their home. Patient has working smoke alarms and has working carbon monoxide detector. Home safety hazards include: none. Nutrition:   Current diet is Regular. Medications:   Patient is currently taking over-the-counter supplements. OTC medications include: see medication list. Patient is able to manage medications. Activities of Daily Living (ADLs)/Instrumental Activities of Daily Living (IADLs):   Walk and transfer into and out of bed and chair?: Yes  Dress and groom yourself?: Yes    Bathe or shower yourself?: Yes    Feed yourself? Yes  Do your laundry/housekeeping?: Yes  Manage your money, pay your bills and track your expenses?: Yes  Make your own meals?: Yes    Do your own shopping?: Yes    Previous Hospitalizations:   Any hospitalizations or ED visits within the last 12 months?: No      Advance Care Planning:   Living will: Yes    Durable POA for healthcare:  Yes    Advanced directive: Yes      PREVENTIVE SCREENINGS      Cardiovascular Screening:    General: Screening Not Indicated and History Lipid Disorder      Diabetes Screening:     General: Screening Current      Colorectal Cancer Screening:     General: Screening Current      Breast Cancer Screening:     General: Screening Current      Cervical Cancer Screening:    General: Screening Not Indicated      Lung Cancer Screening:     General: Screening Not Indicated      Hepatitis C Screening:    General: Screening Current    Screening, Brief Intervention, and Referral to Treatment (SBIRT)    Screening      AUDIT-C Screenin) How often did you have a drink containing alcohol in the past year? never  2) How many drinks did you have on a typical day when you were drinking in the past year? 0  3) How often did you have 6 or more drinks on one occasion in the past year? never    AUDIT-C Score: 0  Interpretation: Score 0-2 (female): Negative screen for alcohol misuse    Single Item Drug Screening:  How often have you used an illegal drug (including marijuana) or a prescription medication for non-medical reasons in the past year? never    Single Item Drug Screen Score: 0  Interpretation: Negative screen for possible drug use disorder    No results found.      Physical Exam:     /68 (BP Location: Left arm, Patient Position: Sitting, Cuff Size: Standard)   Pulse 67   Temp (!) 97.3 °F (36.3 °C) (Skin)   Ht 5' 2" (1.575 m)   Wt 59 kg (130 lb)   SpO2 96%   BMI 23.78 kg/m²     Physical Exam     Radha Lazcano MD

## 2023-07-14 NOTE — ASSESSMENT & PLAN NOTE
Patient is stable with current anti-hypertensive medicine and continue to follow a low sodium diet and take current medication. All questions about this condition were answered today.

## 2023-07-14 NOTE — PATIENT INSTRUCTIONS
Medicare Preventive Visit Patient Instructions  Thank you for completing your Welcome to Medicare Visit or Medicare Annual Wellness Visit today. Your next wellness visit will be due in one year (7/14/2024). The screening/preventive services that you may require over the next 5-10 years are detailed below. Some tests may not apply to you based off risk factors and/or age. Screening tests ordered at today's visit but not completed yet may show as past due. Also, please note that scanned in results may not display below. Preventive Screenings:  Service Recommendations Previous Testing/Comments   Colorectal Cancer Screening  * Colonoscopy    * Fecal Occult Blood Test (FOBT)/Fecal Immunochemical Test (FIT)  * Fecal DNA/Cologuard Test  * Flexible Sigmoidoscopy Age: 43-73 years old   Colonoscopy: every 10 years (may be performed more frequently if at higher risk)  OR  FOBT/FIT: every 1 year  OR  Cologuard: every 3 years  OR  Sigmoidoscopy: every 5 years  Screening may be recommended earlier than age 39 if at higher risk for colorectal cancer. Also, an individualized decision between you and your healthcare provider will decide whether screening between the ages of 77-80 would be appropriate. Colonoscopy: 10/23/2018  FOBT/FIT: Not on file  Cologuard: Not on file  Sigmoidoscopy: Not on file    Screening Current     Breast Cancer Screening Age: 36 years old  Frequency: every 1-2 years  Not required if history of left and right mastectomy Mammogram: 11/07/2022    Screening Current   Cervical Cancer Screening Between the ages of 21-29, pap smear recommended once every 3 years. Between the ages of 32-69, can perform pap smear with HPV co-testing every 5 years.    Recommendations may differ for women with a history of total hysterectomy, cervical cancer, or abnormal pap smears in past. Pap Smear: 10/12/2020    Screening Not Indicated   Hepatitis C Screening Once for adults born between 1945 and 1965  More frequently in patients at high risk for Hepatitis C Hep C Antibody: 12/09/2021    Screening Current   Diabetes Screening 1-2 times per year if you're at risk for diabetes or have pre-diabetes Fasting glucose: 86 mg/dL (7/6/2023)  A1C: No results in last 5 years (No results in last 5 years)  Screening Current   Cholesterol Screening Once every 5 years if you don't have a lipid disorder. May order more often based on risk factors. Lipid panel: 07/06/2023    Screening Not Indicated  History Lipid Disorder     Other Preventive Screenings Covered by Medicare:  1. Abdominal Aortic Aneurysm (AAA) Screening: covered once if your at risk. You're considered to be at risk if you have a family history of AAA. 2. Lung Cancer Screening: covers low dose CT scan once per year if you meet all of the following conditions: (1) Age 48-67; (2) No signs or symptoms of lung cancer; (3) Current smoker or have quit smoking within the last 15 years; (4) You have a tobacco smoking history of at least 20 pack years (packs per day multiplied by number of years you smoked); (5) You get a written order from a healthcare provider. 3. Glaucoma Screening: covered annually if you're considered high risk: (1) You have diabetes OR (2) Family history of glaucoma OR (3)  aged 48 and older OR (3)  American aged 72 and older  3. Osteoporosis Screening: covered every 2 years if you meet one of the following conditions: (1) You're estrogen deficient and at risk for osteoporosis based off medical history and other findings; (2) Have a vertebral abnormality; (3) On glucocorticoid therapy for more than 3 months; (4) Have primary hyperparathyroidism; (5) On osteoporosis medications and need to assess response to drug therapy. · Last bone density test (DXA Scan): 10/19/2020.  5. HIV Screening: covered annually if you're between the age of 15-65. Also covered annually if you are younger than 13 and older than 72 with risk factors for HIV infection. For pregnant patients, it is covered up to 3 times per pregnancy. Immunizations:  Immunization Recommendations   Influenza Vaccine Annual influenza vaccination during flu season is recommended for all persons aged >= 6 months who do not have contraindications   Pneumococcal Vaccine   * Pneumococcal conjugate vaccine = PCV13 (Prevnar 13), PCV15 (Vaxneuvance), PCV20 (Prevnar 20)  * Pneumococcal polysaccharide vaccine = PPSV23 (Pneumovax) Adults 20-63 years old: 1-3 doses may be recommended based on certain risk factors  Adults 72 years old: 1-2 doses may be recommended based off what pneumonia vaccine you previously received   Hepatitis B Vaccine 3 dose series if at intermediate or high risk (ex: diabetes, end stage renal disease, liver disease)   Tetanus (Td) Vaccine - COST NOT COVERED BY MEDICARE PART B Following completion of primary series, a booster dose should be given every 10 years to maintain immunity against tetanus. Td may also be given as tetanus wound prophylaxis. Tdap Vaccine - COST NOT COVERED BY MEDICARE PART B Recommended at least once for all adults. For pregnant patients, recommended with each pregnancy. Shingles Vaccine (Shingrix) - COST NOT COVERED BY MEDICARE PART B  2 shot series recommended in those aged 48 and above     Health Maintenance Due:      Topic Date Due   • Breast Cancer Screening: Mammogram  11/07/2023   • Colorectal Cancer Screening  10/23/2025   • Hepatitis C Screening  Completed     Immunizations Due:      Topic Date Due   • COVID-19 Vaccine (3 - Pfizer series) 05/04/2021   • Influenza Vaccine (1) 09/01/2023     Advance Directives   What are advance directives? Advance directives are legal documents that state your wishes and plans for medical care. These plans are made ahead of time in case you lose your ability to make decisions for yourself. Advance directives can apply to any medical decision, such as the treatments you want, and if you want to donate organs.    What are the types of advance directives? There are many types of advance directives, and each state has rules about how to use them. You may choose a combination of any of the following:  · Living will: This is a written record of the treatment you want. You can also choose which treatments you do not want, which to limit, and which to stop at a certain time. This includes surgery, medicine, IV fluid, and tube feedings. · Durable power of  for [facility]): This is a written record that states who you want to make healthcare choices for you when you are unable to make them for yourself. This person, called a proxy, is usually a family member or a friend. You may choose more than 1 proxy. · Do not resuscitate (DNR) order:  A DNR order is used in case your heart stops beating or you stop breathing. It is a request not to have certain forms of treatment, such as CPR. A DNR order may be included in other types of advance directives. · Medical directive: This covers the care that you want if you are in a coma, near death, or unable to make decisions for yourself. You can list the treatments you want for each condition. Treatment may include pain medicine, surgery, blood transfusions, dialysis, IV or tube feedings, and a ventilator (breathing machine). · Values history: This document has questions about your views, beliefs, and how you feel and think about life. This information can help others choose the care that you would choose. Why are advance directives important? An advance directive helps you control your care. Although spoken wishes may be used, it is better to have your wishes written down. Spoken wishes can be misunderstood, or not followed. Treatments may be given even if you do not want them. An advance directive may make it easier for your family to make difficult choices about your care. Urinary Incontinence   Urinary incontinence (UI)  is when you lose control of your bladder.  UI develops because your bladder cannot store or empty urine properly. The 3 most common types of UI are stress incontinence, urge incontinence, or both. Medicines:   · May be given to help strengthen your bladder control. Report any side effects of medication to your healthcare provider. Do pelvic muscle exercises often:  Your pelvic muscles help you stop urinating. Squeeze these muscles tight for 5 seconds, then relax for 5 seconds. Gradually work up to squeezing for 10 seconds. Do 3 sets of 15 repetitions a day, or as directed. This will help strengthen your pelvic muscles and improve bladder control. Train your bladder:  Go to the bathroom at set times, such as every 2 hours, even if you do not feel the urge to go. You can also try to hold your urine when you feel the urge to go. For example, hold your urine for 5 minutes when you feel the urge to go. As that becomes easier, hold your urine for 10 minutes. Self-care:   · Keep a UI record. Write down how often you leak urine and how much you leak. Make a note of what you were doing when you leaked urine. · Drink liquids as directed. You may need to limit the amount of liquid you drink to help control your urine leakage. Do not drink any liquid right before you go to bed. Limit or do not have drinks that contain caffeine or alcohol. · Prevent constipation. Eat a variety of high-fiber foods. Good examples are high-fiber cereals, beans, vegetables, and whole-grain breads. Walking is the best way to trigger your intestines to have a bowel movement. · Exercise regularly and maintain a healthy weight. Weight loss and exercise will decrease pressure on your bladder and help you control your leakage. · Use a catheter as directed  to help empty your bladder. A catheter is a tiny, plastic tube that is put into your bladder to drain your urine. · Go to behavior therapy as directed.   Behavior therapy may be used to help you learn to control your urge to urinate. © Copyright [employer] 2018 Information is for End User's use only and may not be sold, redistributed or otherwise used for commercial purposes.  All illustrations and images included in CareNotes® are the copyrighted property of [employer] or [address] [place] [address]

## 2023-07-14 NOTE — ASSESSMENT & PLAN NOTE
Patient to continue utilizing medical therapy as well as counseling sources as applicable to condition. If suicidal thought or fear of suicide attempt, to call 911 and seek help immediately. Medications and therapy reviewed today and all patient  questions answered today.

## 2023-07-14 NOTE — PROGRESS NOTES
Name: Sander Valles      :       MRN: [id number]  Encounter Provider: Allyssa Norton MD  Encounter Date: 2023   Encounter department: [facility] [address]     1. Well adult exam    2. Essential hypertension  Assessment & Plan:  Patient is stable with current anti-hypertensive medicine and continue to follow a low sodium diet and take current medication. All questions about this condition were answered today. Orders:  -     Comprehensive metabolic panel; Future  -     CBC and differential; Future  -     Magnesium; Future  -     Uric acid; Future  -     Urinalysis with microscopic  -     TSH, 3rd generation with Free T4 reflex; Future    3. PAD (peripheral artery disease) ([address])  Assessment & Plan:  Patient is stable  and will continue present plan of care and reassess at next routine visit. All questions about this problem from patient were answered today. 4. Anxiety disorder, unspecified type  Assessment & Plan:  Patient to continue utilizing medical therapy as well as counseling sources as applicable to condition. If suicidal thought or fear of suicide attempt, to call 911 and seek help immediately. Medications and therapy reviewed today and all patient  questions answered today. 5. Mixed hyperlipidemia  Assessment & Plan:  Patient  is stable with current medication and we discussed a low fat low cholesterol diet. Weight loss also discussed for this will help lower cholesterol also. Recheck lipids in 6 months. Orders:  -     Lipid Panel with Direct LDL reflex; Future    6. Major depressive disorder with single episode, in remission [facility])  Assessment & Plan:  Patient to continue utilizing medical therapy as well and counseling sources as applicable for condition. If  suicidal thought or fear of suicide to contact 911 and seek help immediately. Meds reviewed and patient questions answered today      7.  Stage 3a chronic kidney disease [facility])  Assessment & Plan:  Lab Results   Component Value Date    EGFR 54 07/06/2023    EGFR 54 01/06/2023    EGFR 54 06/30/2022    CREATININE 1.01 07/06/2023    CREATININE 1.01 01/06/2023    CREATININE 1.01 06/30/2022   Pt to avoid NSAIDs and any IV dyes. Patient to follow up eoither with nephrology or  with us for  further  monitoring of  renal function. 8. Pernicious anemia  -     cyanocobalamin injection 1,000 mcg    9. Screening mammogram for high-risk patient  -     Mammo screening bilateral w 3d & cad; Future; Expected date: 07/14/2023    10. Menopause  -     DXA bone density spine hip and pelvis; Future; Expected date: 07/14/2023        Depression Screening and Follow-up Plan: Patient assessed for underlying major depression. Brief counseling provided and recommend additional follow-up/re-evaluation next office visit. Patient advised to follow-up with PCP for further management. Falls Plan of Care: balance, strength, and gait training instructions were provided. Home safety education provided. Urinary Incontinence Plan of Care: counseling topics discussed: practice Kegel (pelvic floor strengthening) exercises, use restroom every 2 hours, limit alcohol, caffeine, spicy foods, and acidic foods, limiting fluid intake 3-4 hours before bed, weight loss, preventing constipation and limiting fluid intake to 60 oz. per day. Subjective     82-year-old female here today for checkup and Medicare wellness. Patient with some hypertension anxiety family history of coronary disease and peripheral vascular disease. Patient is doing very well reviewed her lab work today patient is very motivated to stay healthy she takes actively taking her medication as well as supplements and her lab work was reviewed and she is doing very well cholesterol is well controlled she is having minimal problems with the rest of her lab work.   Patient does not need any refills of medicines at this point and will call when appropriate. Patient is having a tough time with his son going through divorce but she is managing. Review of Systems   Constitutional: Negative for activity change, appetite change, fatigue and fever. HENT: Negative for congestion, ear pain, postnasal drip, rhinorrhea, sinus pressure, sinus pain, sneezing and sore throat. Eyes: Negative for pain and redness. Respiratory: Negative for apnea, cough, chest tightness, shortness of breath and wheezing. Cardiovascular: Negative for chest pain, palpitations and leg swelling. Gastrointestinal: Negative for abdominal pain, constipation, diarrhea, nausea and vomiting. Endocrine: Negative for cold intolerance and heat intolerance. Genitourinary: Negative for difficulty urinating, dysuria, frequency, hematuria and urgency. Musculoskeletal: Negative for arthralgias, back pain, gait problem and myalgias. Skin: Negative for rash. Neurological: Negative for dizziness, speech difficulty, weakness, numbness and headaches. Hematological: Does not bruise/bleed easily. Psychiatric/Behavioral: Negative for agitation, confusion and hallucinations. Past Medical History:   Diagnosis Date   • Anxiety    • Colon polyps    • Essential hypertension 8/8/2018   • Gallbladder polyp    • Kidney cysts    • Kidney stones    • Lung disease      Past Surgical History:   Procedure Laterality Date   • COLONOSCOPY     • COLONOSCOPY W/ POLYPECTOMY     • [place] EDG US EXAM SURGICAL ALTER STOM DUODENUM/JEJUNUM N/A 3/14/2019    Procedure: LINEAR ENDOSCOPIC U/S;  Surgeon: Parminder Dickinson MD;  Location: [facility];   Service: Gastroenterology     Family History   Problem Relation Age of Onset   • Hyperlipidemia Mother    • Heart attack Father    • Other Sister         open heart surgery    • Heart murmur Sister    • Stroke Sister    • Hyperlipidemia Sister         all siblings    • Breast cancer Sister 79   • Parkinsonism Sister    • Cancer Sister    • Heart attack Brother open heart surgery    • Deep vein thrombosis Brother    • Hyperlipidemia Brother    • Coronary artery disease Brother         stents placed    • Heart attack Paternal Aunt    • Heart attack Paternal Uncle    • Hypertension Family         all in the family both sides   • No Known Problems Daughter    • No Known Problems Maternal Grandmother    • No Known Problems Maternal Grandfather    • No Known Problems Paternal Grandmother    • No Known Problems Paternal Grandfather    • No Known Problems Sister    • No Known Problems Brother    • No Known Problems Son    • No Known Problems Son    • Anuerysm Neg Hx    • Heart failure Neg Hx      Social History     Socioeconomic History   • Marital status: /Civil Union     Spouse name: None   • Number of children: None   • Years of education: None   • Highest education level:  Bachelor's degree (e.g., BA, AB, BS)   Occupational History   • Occupation: retired   Tobacco Use   • Smoking status: Never   • Smokeless tobacco: Never   Vaping Use   • Vaping Use: Never used   Substance and Sexual Activity   • Alcohol use: Yes     Comment: social - rare    • Drug use: No   • Sexual activity: Not Currently     Partners: Male   Other Topics Concern   • None   Social History Narrative    Most recent tobacco use screenin-      Do you currently or have you served in the [address] [place] [address]:   No      Were you activated, into active duty, as a member of the [employer] or as a Reservist:   No      Occupation:   homemaker      Marital status:         Sexual orientation:   Heterosexual      Exercise level:   Occasional      Diet:   Regular      Alcohol intake:   Occasional      Caffeine intake:   Occasional      Chewing tobacco:   none      Illicit drugs:   denies      Guns present in home:   No      Seat belts used routinely:   Yes      Sunscreen used routinely:   No      stays out of sun; allergic to sunscreen    Live alone or with others:   with others      Smoke alarm in home:   Yes      Advance directive:   No      Has the Patient had a mammogram to screen for breast cancer within 24 months:   Yes      Please enter the date of the Patient's previous mammogram.:   08-      Sexually active:   Yes      Social Determinants of Health     Financial Resource Strain: Low Risk  (7/14/2023)    Overall Financial Resource Strain (CARDIA)    • Difficulty of Paying Living Expenses: Not hard at all   Food Insecurity: Not on file   Transportation Needs: No Transportation Needs (7/14/2023)    PRAPARE - Transportation    • Lack of Transportation (Medical): No    • Lack of Transportation (Non-Medical):  No   Physical Activity: Not on file   Stress: Not on file   Social Connections: Not on file   Intimate Partner Violence: Not on file   Housing Stability: Not on file     Current Outpatient Medications on File Prior to Visit   Medication Sig   • amLODIPine (NORVASC) 5 mg tablet TAKE ONE TABLET BY MOUTH ONCE DAILY   • benazepril (LOTENSIN) 40 MG tablet TAKE TWO TABLETS BY MOUTH IN THE EARLY MORNING   • calcium-vitamin D 250-100 MG-UNIT per tablet Take 1 tablet by mouth 2 (two) times a day   • clindamycin (CLINDAGEL) 1 % gel Apply topically 2 (two) times a day   • Coenzyme Q10 (COQ-10) 100 MG CAPS Take 1 capsule by mouth daily   • Cyanocobalamin (VITAMIN B12 PO) Take by mouth   • escitalopram (LEXAPRO) 10 mg tablet TAKE ONE TABLET BY MOUTH AT BEDTIME   • ezetimibe (ZETIA) 10 mg tablet TAKE ONE TABLET BY MOUTH ONCE DAILY   • Magnesium Oxide -Mg Supplement 400 MG CAPS Take 1 capsule by mouth daily   • metoprolol succinate (TOPROL-XL) 25 mg 24 hr tablet TAKE ONE TABLET BY MOUTH AT BEDTIME   • Omega-3 Fatty Acids (Omega-3 Fish Oil) 500 MG CAPS Take by mouth   • Plant Sterols and Stanols (CHOLESTOFF PO) Take by mouth   • Red Yeast Rice 600 MG CAPS Take 2 capsules by mouth daily   • [DISCONTINUED] tobramycin-dexamethasone (TOBRADEX) ophthalmic suspension  (Patient not taking: Reported on 7/14/2023)     Allergies   Allergen Reactions   • Imdur [Isosorbide Nitrate] Headache   • Sulfa Antibiotics Hives   • Ciprofloxacin Rash and GI Intolerance   • Macrobid [Nitrofurantoin] Other (See Comments)     Pt does not recall reaction     Immunization History   Administered Date(s) Administered   • COVID-19 PFIZER VACCINE 0.3 ML IM 02/17/2021, 03/09/2021   • INFLUENZA 11/11/2014, 11/18/2016, 11/01/2018, 10/07/2019   • Influenza, high dose seasonal 0.7 mL 12/17/2021   • Pneumococcal Conjugate 13-Valent 11/11/2014   • Pneumococcal Polysaccharide PPV23 11/10/2015       Objective     /68 (BP Location: Left arm, Patient Position: Sitting, Cuff Size: Standard)   Pulse 67   Temp (!) 97.3 °F (36.3 °C) (Skin)   Ht 5' 2" (1.575 m)   Wt 59 kg (130 lb)   SpO2 96%   BMI 23.78 kg/m²     Physical Exam  Vitals and nursing note reviewed. Constitutional:       Appearance: She is well-developed. HENT:      Head: Normocephalic and atraumatic. Nose: Nose normal.      Mouth/Throat:      Mouth: Mucous membranes are moist.   Eyes:      General: No scleral icterus. Conjunctiva/sclera: Conjunctivae normal.      Pupils: Pupils are equal, round, and reactive to light. Neck:      Thyroid: No thyromegaly. Cardiovascular:      Rate and Rhythm: Normal rate and regular rhythm. Pulmonary:      Effort: Pulmonary effort is normal.      Breath sounds: Normal breath sounds. No wheezing. Abdominal:      General: Bowel sounds are normal. There is no distension. Palpations: Abdomen is soft. Tenderness: There is no abdominal tenderness. There is no guarding or rebound. Musculoskeletal:         General: No tenderness or deformity. Normal range of motion. Cervical back: Normal range of motion and neck supple. Skin:     General: Skin is warm and dry. Findings: No erythema or rash. Neurological:      Mental Status: She is alert and oriented to person, place, and time.       Sensory: No sensory deficit. Psychiatric:         Mood and Affect: Mood normal.         Behavior: Behavior normal.         Thought Content:  Thought content normal.         Judgment: Judgment normal.       Delfino Horn MD

## 2023-07-14 NOTE — ASSESSMENT & PLAN NOTE
Patient  is stable with current medication and we discussed a low fat low cholesterol diet. Weight loss also discussed for this will help lower cholesterol also. Recheck lipids in 6 months.

## 2023-07-14 NOTE — ASSESSMENT & PLAN NOTE
Lab Results   Component Value Date    EGFR 54 07/06/2023    EGFR 54 01/06/2023    EGFR 54 06/30/2022    CREATININE 1.01 07/06/2023    CREATININE 1.01 01/06/2023    CREATININE 1.01 06/30/2022   Pt to avoid NSAIDs and any IV dyes. Patient to follow up eoither with nephrology or  with us for  further  monitoring of  renal function.

## 2023-07-14 NOTE — ASSESSMENT & PLAN NOTE
Patient to continue utilizing medical therapy as well and counseling sources as applicable for condition. If  suicidal thought or fear of suicide to contact 911 and seek help immediately.  Meds reviewed and patient questions answered today

## 2023-08-01 DIAGNOSIS — I10 ESSENTIAL HYPERTENSION: ICD-10-CM

## 2023-08-01 RX ORDER — BENAZEPRIL HYDROCHLORIDE 40 MG/1
TABLET, FILM COATED ORAL
Qty: 180 TABLET | Refills: 0 | Status: SHIPPED | OUTPATIENT
Start: 2023-08-01

## 2023-08-15 ENCOUNTER — TELEPHONE (OUTPATIENT)
Dept: FAMILY MEDICINE CLINIC | Facility: CLINIC | Age: 78
End: 2023-08-15

## 2023-08-15 ENCOUNTER — CLINICAL SUPPORT (OUTPATIENT)
Dept: FAMILY MEDICINE CLINIC | Facility: CLINIC | Age: 78
End: 2023-08-15
Payer: COMMERCIAL

## 2023-08-15 DIAGNOSIS — D51.0 PERNICIOUS ANEMIA: Primary | ICD-10-CM

## 2023-08-15 PROCEDURE — 96372 THER/PROPH/DIAG INJ SC/IM: CPT | Performed by: FAMILY MEDICINE

## 2023-08-15 RX ADMIN — CYANOCOBALAMIN 1000 MCG: 1000 INJECTION, SOLUTION INTRAMUSCULAR; SUBCUTANEOUS at 09:10

## 2023-08-15 NOTE — TELEPHONE ENCOUNTER
Patient was here for her b12 shot and asked why she has Stage 3a chronic kidney disease   Listed in her "problem list"    She also wants to know how long she is going to be getting the b12

## 2023-08-16 NOTE — TELEPHONE ENCOUNTER
Call pt. She has had stage 3A renal . We can address at her next routine OV . As for the B12 ,she can get that monthly as long as she wants.

## 2023-08-20 DIAGNOSIS — I10 ESSENTIAL HYPERTENSION: ICD-10-CM

## 2023-08-23 RX ORDER — AMLODIPINE BESYLATE 5 MG/1
TABLET ORAL
Qty: 90 TABLET | Refills: 0 | Status: SHIPPED | OUTPATIENT
Start: 2023-08-23

## 2023-09-06 DIAGNOSIS — F32.5 MAJOR DEPRESSIVE DISORDER WITH SINGLE EPISODE, IN REMISSION (HCC): ICD-10-CM

## 2023-09-06 RX ORDER — ESCITALOPRAM OXALATE 10 MG/1
TABLET ORAL
Qty: 90 TABLET | Refills: 0 | Status: SHIPPED | OUTPATIENT
Start: 2023-09-06

## 2023-09-18 ENCOUNTER — CLINICAL SUPPORT (OUTPATIENT)
Dept: FAMILY MEDICINE CLINIC | Facility: CLINIC | Age: 78
End: 2023-09-18
Payer: COMMERCIAL

## 2023-09-18 DIAGNOSIS — D51.0 PERNICIOUS ANEMIA: Primary | ICD-10-CM

## 2023-09-18 PROCEDURE — 96372 THER/PROPH/DIAG INJ SC/IM: CPT | Performed by: FAMILY MEDICINE

## 2023-09-18 RX ADMIN — CYANOCOBALAMIN 1000 MCG: 1000 INJECTION, SOLUTION INTRAMUSCULAR; SUBCUTANEOUS at 09:19

## 2023-10-08 DIAGNOSIS — I10 ESSENTIAL HYPERTENSION: ICD-10-CM

## 2023-10-08 DIAGNOSIS — E78.5 HYPERLIPIDEMIA, UNSPECIFIED HYPERLIPIDEMIA TYPE: ICD-10-CM

## 2023-10-09 RX ORDER — EZETIMIBE 10 MG/1
TABLET ORAL
Qty: 90 TABLET | Refills: 0 | Status: SHIPPED | OUTPATIENT
Start: 2023-10-09

## 2023-10-09 RX ORDER — METOPROLOL SUCCINATE 25 MG/1
TABLET, EXTENDED RELEASE ORAL
Qty: 90 TABLET | Refills: 0 | Status: SHIPPED | OUTPATIENT
Start: 2023-10-09

## 2023-10-19 ENCOUNTER — TELEPHONE (OUTPATIENT)
Dept: FAMILY MEDICINE CLINIC | Facility: CLINIC | Age: 78
End: 2023-10-19

## 2023-10-19 ENCOUNTER — CLINICAL SUPPORT (OUTPATIENT)
Dept: FAMILY MEDICINE CLINIC | Facility: CLINIC | Age: 78
End: 2023-10-19
Payer: COMMERCIAL

## 2023-10-19 DIAGNOSIS — E53.8 B12 DEFICIENCY: Primary | ICD-10-CM

## 2023-10-19 DIAGNOSIS — D51.0 PERNICIOUS ANEMIA: Primary | ICD-10-CM

## 2023-10-19 RX ADMIN — CYANOCOBALAMIN 1000 MCG: 1000 INJECTION, SOLUTION INTRAMUSCULAR; SUBCUTANEOUS at 09:28

## 2023-10-19 NOTE — TELEPHONE ENCOUNTER
I will order a B12 level. If it is good and she is not having fatigue , she may stop the B12 shots if she wants.

## 2023-10-19 NOTE — TELEPHONE ENCOUNTER
Abida Overall has been getting her B 12 injections for 6 months now she is wondering when she has to get labs done and what the plan is to keep getting the shots or not? Please review last labs were done in July.

## 2023-10-25 ENCOUNTER — APPOINTMENT (OUTPATIENT)
Dept: LAB | Facility: MEDICAL CENTER | Age: 78
End: 2023-10-25
Payer: COMMERCIAL

## 2023-10-25 DIAGNOSIS — I10 ESSENTIAL HYPERTENSION: ICD-10-CM

## 2023-10-25 DIAGNOSIS — E78.2 MIXED HYPERLIPIDEMIA: ICD-10-CM

## 2023-10-25 DIAGNOSIS — E53.8 B12 DEFICIENCY: ICD-10-CM

## 2023-10-25 LAB — VIT B12 SERPL-MCNC: 1419 PG/ML (ref 180–914)

## 2023-10-25 PROCEDURE — 82607 VITAMIN B-12: CPT

## 2023-10-25 PROCEDURE — 36415 COLL VENOUS BLD VENIPUNCTURE: CPT

## 2023-10-28 DIAGNOSIS — I10 ESSENTIAL HYPERTENSION: ICD-10-CM

## 2023-10-30 RX ORDER — BENAZEPRIL HYDROCHLORIDE 40 MG/1
TABLET, FILM COATED ORAL
Qty: 180 TABLET | Refills: 0 | Status: SHIPPED | OUTPATIENT
Start: 2023-10-30

## 2023-11-01 ENCOUNTER — TELEPHONE (OUTPATIENT)
Age: 78
End: 2023-11-01

## 2023-11-01 NOTE — TELEPHONE ENCOUNTER
Patient is calling in regards to her B12 labs. She states the results came back very high. She is scheduled for another injection on 11/20 but she does not know if she should continue to get the injections with her levels. Patient would like a call back to go over high B12 results and recommendation on continuation of injection. Please advise, thank you!

## 2023-11-02 NOTE — TELEPHONE ENCOUNTER
Spoke with patient . She wants to cancel her injection in November and will call back in early December to schedule for that month.

## 2023-11-15 ENCOUNTER — TELEPHONE (OUTPATIENT)
Dept: GASTROENTEROLOGY | Facility: CLINIC | Age: 78
End: 2023-11-15

## 2023-11-15 DIAGNOSIS — E53.8 B12 DEFICIENCY: Primary | ICD-10-CM

## 2023-11-15 NOTE — TELEPHONE ENCOUNTER
Pt walked in office stating that   she needs a order to have her B12 checked before she can have her next B12 shot.  She would l,lalita a call when the order is complete

## 2023-11-15 NOTE — TELEPHONE ENCOUNTER
I have ordered her B12 level. Please call patient let her know that she can get the lab work whenever she wants.

## 2023-11-18 DIAGNOSIS — I10 ESSENTIAL HYPERTENSION: ICD-10-CM

## 2023-11-20 ENCOUNTER — APPOINTMENT (OUTPATIENT)
Dept: LAB | Facility: MEDICAL CENTER | Age: 78
End: 2023-11-20
Payer: COMMERCIAL

## 2023-11-20 DIAGNOSIS — E53.8 B12 DEFICIENCY: ICD-10-CM

## 2023-11-20 DIAGNOSIS — E53.8 B12 DEFICIENCY: Primary | ICD-10-CM

## 2023-11-20 LAB — VIT B12 SERPL-MCNC: 1046 PG/ML (ref 180–914)

## 2023-11-20 PROCEDURE — 82607 VITAMIN B-12: CPT

## 2023-11-20 RX ORDER — AMLODIPINE BESYLATE 5 MG/1
TABLET ORAL
Qty: 90 TABLET | Refills: 0 | Status: SHIPPED | OUTPATIENT
Start: 2023-11-20

## 2023-11-21 ENCOUNTER — OFFICE VISIT (OUTPATIENT)
Dept: FAMILY MEDICINE CLINIC | Facility: CLINIC | Age: 78
End: 2023-11-21
Payer: COMMERCIAL

## 2023-11-21 VITALS
DIASTOLIC BLOOD PRESSURE: 58 MMHG | BODY MASS INDEX: 23.19 KG/M2 | SYSTOLIC BLOOD PRESSURE: 120 MMHG | RESPIRATION RATE: 16 BRPM | OXYGEN SATURATION: 99 % | WEIGHT: 126 LBS | HEART RATE: 67 BPM | HEIGHT: 62 IN | TEMPERATURE: 97.3 F

## 2023-11-21 DIAGNOSIS — J06.9 URI WITH COUGH AND CONGESTION: Primary | ICD-10-CM

## 2023-11-21 LAB
SARS-COV-2 AG UPPER RESP QL IA: NEGATIVE
VALID CONTROL: NORMAL

## 2023-11-21 PROCEDURE — 99213 OFFICE O/P EST LOW 20 MIN: CPT | Performed by: NURSE PRACTITIONER

## 2023-11-21 PROCEDURE — 87811 SARS-COV-2 COVID19 W/OPTIC: CPT | Performed by: NURSE PRACTITIONER

## 2023-11-21 NOTE — PROGRESS NOTES
Assessment/Plan:      Diagnoses and all orders for this visit:    URI with cough and congestion  -     POCT Rapid Covid Ag        82-year-old female here with "feeling like something in her throat" she sent since she has gargle with salt water and it has passed but just wanted to be checked. Physical assessment was unremarkable vital signs were reviewed and found to be well within normal limits. Patient is primarily concerned for COVID as the holidays are coming up. She denies any other related symptoms. COVID test was performed and found to be negative. Patient is advised to continue to monitor the situation if fails to improve or worsen she is to reach out to me as additional testing could be warranted. Subjective:     Patient ID: Christian Sellers is a 78 y.o. female. Patient is here with a complaint of upper respiratory tract discomfort has had a cough runny nose and some nasal congestion. Denies any need nausea vomiting diarrhea chest pains or shortness of breath. All over-the-counter medication attempted arm were unsuccessful. Sore Throat   Associated symptoms include trouble swallowing. Review of Systems   HENT:  Positive for sore throat and trouble swallowing. Objective:     Physical Exam  Vitals and nursing note reviewed. Constitutional:       General: She is not in acute distress. Appearance: She is well-developed. She is not diaphoretic. HENT:      Head: Normocephalic and atraumatic. Right Ear: Tympanic membrane and external ear normal.      Left Ear: Tympanic membrane and external ear normal.      Mouth/Throat:      Mouth: Mucous membranes are moist.      Pharynx: Uvula midline. Posterior oropharyngeal erythema present. Tonsils: 0 on the right. 0 on the left. Eyes:      Pupils: Pupils are equal, round, and reactive to light. Cardiovascular:      Rate and Rhythm: Normal rate and regular rhythm.    Pulmonary:      Effort: Pulmonary effort is normal. Breath sounds: Normal breath sounds. Abdominal:      Palpations: Abdomen is soft. Musculoskeletal:         General: Normal range of motion. Cervical back: Normal range of motion and neck supple. Neurological:      Mental Status: She is alert and oriented to person, place, and time. Psychiatric:         Behavior: Behavior normal.         Thought Content:  Thought content normal.

## 2023-11-30 NOTE — PATIENT INSTRUCTIONS
Recommendations:  1  Continue current medications  2  Follow up in 12 months 
,DirectAddress_Unknown

## 2023-12-09 DIAGNOSIS — F32.5 MAJOR DEPRESSIVE DISORDER WITH SINGLE EPISODE, IN REMISSION (HCC): ICD-10-CM

## 2023-12-11 RX ORDER — ESCITALOPRAM OXALATE 10 MG/1
TABLET ORAL
Qty: 90 TABLET | Refills: 1 | Status: SHIPPED | OUTPATIENT
Start: 2023-12-11

## 2023-12-20 ENCOUNTER — HOSPITAL ENCOUNTER (OUTPATIENT)
Dept: RADIOLOGY | Facility: MEDICAL CENTER | Age: 78
Discharge: HOME | End: 2023-12-20
Payer: COMMERCIAL

## 2023-12-20 VITALS — HEIGHT: 62 IN | BODY MASS INDEX: 23.19 KG/M2 | WEIGHT: 126 LBS

## 2023-12-20 DIAGNOSIS — Z13.820 SCREENING FOR OSTEOPOROSIS: ICD-10-CM

## 2023-12-20 DIAGNOSIS — Z12.31 ENCOUNTER FOR SCREENING MAMMOGRAM FOR MALIGNANT NEOPLASM OF BREAST: ICD-10-CM

## 2023-12-20 DIAGNOSIS — Z78.0 MENOPAUSE: ICD-10-CM

## 2023-12-20 DIAGNOSIS — Z12.31 SCREENING MAMMOGRAM FOR HIGH-RISK PATIENT: ICD-10-CM

## 2023-12-20 PROCEDURE — 77067 SCR MAMMO BI INCL CAD: CPT

## 2023-12-20 PROCEDURE — 77080 DXA BONE DENSITY AXIAL: CPT

## 2023-12-20 PROCEDURE — 77063 BREAST TOMOSYNTHESIS BI: CPT

## 2023-12-22 ENCOUNTER — ANNUAL EXAM (OUTPATIENT)
Dept: OBGYN CLINIC | Facility: CLINIC | Age: 78
End: 2023-12-22
Payer: COMMERCIAL

## 2023-12-22 VITALS
DIASTOLIC BLOOD PRESSURE: 64 MMHG | SYSTOLIC BLOOD PRESSURE: 118 MMHG | WEIGHT: 129.2 LBS | BODY MASS INDEX: 23.77 KG/M2 | HEIGHT: 62 IN

## 2023-12-22 DIAGNOSIS — Z01.419 WOMEN'S ANNUAL ROUTINE GYNECOLOGICAL EXAMINATION: Primary | ICD-10-CM

## 2023-12-22 DIAGNOSIS — Z12.11 SCREEN FOR COLON CANCER: ICD-10-CM

## 2023-12-22 DIAGNOSIS — Z12.31 SCREENING MAMMOGRAM, ENCOUNTER FOR: ICD-10-CM

## 2023-12-22 PROCEDURE — G0101 CA SCREEN;PELVIC/BREAST EXAM: HCPCS | Performed by: OBSTETRICS & GYNECOLOGY

## 2023-12-22 NOTE — PROGRESS NOTES
"Subjective      Freda Canas is a 78 y.o. female who presents for annual well woman exam.           Family history of breast cancer: yes - sister breast cancer    Menstrual History:  OB History          3    Para   3    Term   3            AB        Living   3         SAB        IAB        Ectopic        Multiple        Live Births   3              No LMP recorded. Patient is postmenopausal.       The following portions of the patient's history were reviewed and updated as appropriate: allergies, current medications, past family history, past medical history, past social history, past surgical history, and problem list.    Review of Systems  Review of Systems   Constitutional:  Positive for fatigue. Negative for activity change, appetite change, chills and fever.   Respiratory:  Negative for apnea, cough, chest tightness and shortness of breath.    Cardiovascular:  Negative for chest pain, palpitations and leg swelling.   Gastrointestinal:  Negative for abdominal pain, constipation, diarrhea, nausea and vomiting.   Genitourinary:  Negative for difficulty urinating, dysuria, flank pain, frequency, hematuria and urgency.   Neurological:  Negative for dizziness, seizures, syncope, light-headedness, numbness and headaches.   Psychiatric/Behavioral:  Negative for agitation and confusion.           Objective      /64 (BP Location: Left arm, Patient Position: Sitting, Cuff Size: Adult)   Ht 5' 2\" (1.575 m)   Wt 58.6 kg (129 lb 3.2 oz)   BMI 23.63 kg/m²     Physical Exam  OBGyn Exam     General:   alert and oriented, in no acute distress, alert, appears stated age, and cooperative   Heart: regular rate and rhythm, S1, S2 normal, no murmur, click, rub or gallop   Lungs: clear to auscultation bilaterally   Abdomen: soft, non-tender, without masses or organomegaly   Vulva: normal   Vagina: normal mucosa, normal discharge   Cervix: no cervical motion tenderness and no lesions   Uterus: normal size "   Adnexa:  Breast Exam:  normal adnexa  breasts appear normal, no suspicious masses, no skin or nipple changes or axillary nodes.                Assessment      @well woman@ .   78-year-old female   Annual exam  Chronic hypertension  Sister breast cancer passed 2023  Prior 3 vaginal delivery   Osteoporosis on  DEXA scan declines treatment  Plan  No Pap needed  Diet/exercise  Calcium/vitamin-D  Mammogram  Refer for colonoscopy  Return to office for annual exam          All questions answered.     There are no Patient Instructions on file for this visit.

## 2024-01-03 DIAGNOSIS — E78.5 HYPERLIPIDEMIA, UNSPECIFIED HYPERLIPIDEMIA TYPE: ICD-10-CM

## 2024-01-03 DIAGNOSIS — I10 ESSENTIAL HYPERTENSION: ICD-10-CM

## 2024-01-03 RX ORDER — METOPROLOL SUCCINATE 25 MG/1
TABLET, EXTENDED RELEASE ORAL
Qty: 90 TABLET | Refills: 0 | Status: SHIPPED | OUTPATIENT
Start: 2024-01-03

## 2024-01-03 RX ORDER — EZETIMIBE 10 MG/1
TABLET ORAL
Qty: 90 TABLET | Refills: 0 | Status: SHIPPED | OUTPATIENT
Start: 2024-01-03

## 2024-01-06 ENCOUNTER — APPOINTMENT (OUTPATIENT)
Dept: LAB | Facility: MEDICAL CENTER | Age: 79
End: 2024-01-06
Payer: COMMERCIAL

## 2024-01-06 DIAGNOSIS — E78.2 MIXED HYPERLIPIDEMIA: ICD-10-CM

## 2024-01-06 DIAGNOSIS — I10 ESSENTIAL HYPERTENSION, BENIGN: ICD-10-CM

## 2024-01-06 LAB
ALBUMIN SERPL BCP-MCNC: 3.9 G/DL (ref 3.5–5)
ALP SERPL-CCNC: 46 U/L (ref 34–104)
ALT SERPL W P-5'-P-CCNC: 18 U/L (ref 7–52)
ANION GAP SERPL CALCULATED.3IONS-SCNC: 8 MMOL/L
AST SERPL W P-5'-P-CCNC: 24 U/L (ref 13–39)
BACTERIA UR QL AUTO: ABNORMAL /HPF
BASOPHILS # BLD AUTO: 0.04 THOUSANDS/ÂΜL (ref 0–0.1)
BASOPHILS NFR BLD AUTO: 1 % (ref 0–1)
BILIRUB SERPL-MCNC: 0.6 MG/DL (ref 0.2–1)
BILIRUB UR QL STRIP: NEGATIVE
BUN SERPL-MCNC: 27 MG/DL (ref 5–25)
CALCIUM SERPL-MCNC: 9.8 MG/DL (ref 8.4–10.2)
CHLORIDE SERPL-SCNC: 102 MMOL/L (ref 96–108)
CHOLEST SERPL-MCNC: 182 MG/DL
CLARITY UR: CLEAR
CO2 SERPL-SCNC: 28 MMOL/L (ref 21–32)
COLOR UR: ABNORMAL
CREAT SERPL-MCNC: 0.92 MG/DL (ref 0.6–1.3)
EOSINOPHIL # BLD AUTO: 0.16 THOUSAND/ÂΜL (ref 0–0.61)
EOSINOPHIL NFR BLD AUTO: 3 % (ref 0–6)
ERYTHROCYTE [DISTWIDTH] IN BLOOD BY AUTOMATED COUNT: 12.5 % (ref 11.6–15.1)
GFR SERPL CREATININE-BSD FRML MDRD: 60 ML/MIN/1.73SQ M
GLUCOSE P FAST SERPL-MCNC: 81 MG/DL (ref 65–99)
GLUCOSE UR STRIP-MCNC: NEGATIVE MG/DL
HCT VFR BLD AUTO: 38.1 % (ref 34.8–46.1)
HDLC SERPL-MCNC: 46 MG/DL
HGB BLD-MCNC: 12.2 G/DL (ref 11.5–15.4)
HGB UR QL STRIP.AUTO: ABNORMAL
IMM GRANULOCYTES # BLD AUTO: 0.02 THOUSAND/UL (ref 0–0.2)
IMM GRANULOCYTES NFR BLD AUTO: 0 % (ref 0–2)
KETONES UR STRIP-MCNC: NEGATIVE MG/DL
LDLC SERPL CALC-MCNC: 118 MG/DL (ref 0–100)
LEUKOCYTE ESTERASE UR QL STRIP: NEGATIVE
LYMPHOCYTES # BLD AUTO: 2.57 THOUSANDS/ÂΜL (ref 0.6–4.47)
LYMPHOCYTES NFR BLD AUTO: 40 % (ref 14–44)
MAGNESIUM SERPL-MCNC: 1.9 MG/DL (ref 1.9–2.7)
MCH RBC QN AUTO: 32 PG (ref 26.8–34.3)
MCHC RBC AUTO-ENTMCNC: 32 G/DL (ref 31.4–37.4)
MCV RBC AUTO: 100 FL (ref 82–98)
MONOCYTES # BLD AUTO: 0.73 THOUSAND/ÂΜL (ref 0.17–1.22)
MONOCYTES NFR BLD AUTO: 11 % (ref 4–12)
NEUTROPHILS # BLD AUTO: 2.95 THOUSANDS/ÂΜL (ref 1.85–7.62)
NEUTS SEG NFR BLD AUTO: 45 % (ref 43–75)
NITRITE UR QL STRIP: NEGATIVE
NON-SQ EPI CELLS URNS QL MICRO: ABNORMAL /HPF
NRBC BLD AUTO-RTO: 0 /100 WBCS
PH UR STRIP.AUTO: 7 [PH]
PLATELET # BLD AUTO: 248 THOUSANDS/UL (ref 149–390)
PMV BLD AUTO: 9.5 FL (ref 8.9–12.7)
POTASSIUM SERPL-SCNC: 4.3 MMOL/L (ref 3.5–5.3)
PROT SERPL-MCNC: 6.9 G/DL (ref 6.4–8.4)
PROT UR STRIP-MCNC: ABNORMAL MG/DL
RBC # BLD AUTO: 3.81 MILLION/UL (ref 3.81–5.12)
RBC #/AREA URNS AUTO: ABNORMAL /HPF
SODIUM SERPL-SCNC: 138 MMOL/L (ref 135–147)
SP GR UR STRIP.AUTO: 1.02 (ref 1–1.03)
TRIGL SERPL-MCNC: 88 MG/DL
TSH SERPL DL<=0.05 MIU/L-ACNC: 3.15 UIU/ML (ref 0.45–4.5)
URATE SERPL-MCNC: 3.8 MG/DL (ref 2–7.5)
UROBILINOGEN UR STRIP-ACNC: <2 MG/DL
WBC # BLD AUTO: 6.47 THOUSAND/UL (ref 4.31–10.16)
WBC #/AREA URNS AUTO: ABNORMAL /HPF

## 2024-01-15 ENCOUNTER — RA CDI HCC (OUTPATIENT)
Dept: OTHER | Facility: HOSPITAL | Age: 79
End: 2024-01-15

## 2024-01-19 ENCOUNTER — OFFICE VISIT (OUTPATIENT)
Dept: FAMILY MEDICINE CLINIC | Facility: CLINIC | Age: 79
End: 2024-01-19
Payer: COMMERCIAL

## 2024-01-19 VITALS
HEART RATE: 67 BPM | SYSTOLIC BLOOD PRESSURE: 124 MMHG | WEIGHT: 129 LBS | OXYGEN SATURATION: 96 % | TEMPERATURE: 97 F | DIASTOLIC BLOOD PRESSURE: 68 MMHG | BODY MASS INDEX: 23.74 KG/M2 | HEIGHT: 62 IN

## 2024-01-19 DIAGNOSIS — I73.9 PAD (PERIPHERAL ARTERY DISEASE) (HCC): ICD-10-CM

## 2024-01-19 DIAGNOSIS — N18.31 STAGE 3A CHRONIC KIDNEY DISEASE (HCC): ICD-10-CM

## 2024-01-19 DIAGNOSIS — E78.2 MIXED HYPERLIPIDEMIA: ICD-10-CM

## 2024-01-19 DIAGNOSIS — I10 ESSENTIAL HYPERTENSION: ICD-10-CM

## 2024-01-19 DIAGNOSIS — F41.9 ANXIETY DISORDER, UNSPECIFIED TYPE: Primary | ICD-10-CM

## 2024-01-19 DIAGNOSIS — F32.5 MAJOR DEPRESSIVE DISORDER WITH SINGLE EPISODE, IN REMISSION (HCC): ICD-10-CM

## 2024-01-19 DIAGNOSIS — M81.0 AGE-RELATED OSTEOPOROSIS WITHOUT CURRENT PATHOLOGICAL FRACTURE: ICD-10-CM

## 2024-01-19 PROCEDURE — 99214 OFFICE O/P EST MOD 30 MIN: CPT | Performed by: FAMILY MEDICINE

## 2024-01-19 RX ORDER — ALENDRONATE SODIUM 70 MG/1
70 TABLET ORAL
Qty: 12 TABLET | Refills: 3 | Status: SHIPPED | OUTPATIENT
Start: 2024-01-19

## 2024-01-19 NOTE — PROGRESS NOTES
Name: Freda Canas      : 1945      MRN: [id number]  Encounter Provider: Shayne Ennis MD  Encounter Date: 2024   Encounter department: [facility]    Assessment & Plan     1. Anxiety disorder, unspecified type  Assessment & Plan:  Patient to continue utilizing medical therapy as well as counseling sources as applicable to condition. If suicidal thought or fear of suicide attempt, to call 911 and seek help immediately. Medications and therapy reviewed today and all patient  questions answered today.       2. Essential hypertension  Assessment & Plan:  Patient is stable with current anti-hypertensive medicine and continue to follow a low sodium diet and take current medication. All questions about this condition were answered today.       3. Mixed hyperlipidemia  Assessment & Plan:  Patient  is stable with current medication and we discussed a low fat low cholesterol diet. Weight loss also discussed for this will help lower cholesterol also. Recheck lipids in 6 months.       4. Major depressive disorder with single episode, in remission (HCC)  Assessment & Plan:  Patient to continue utilizing medical therapy as well and counseling sources as applicable for condition. If  suicidal thought or fear of suicide to contact 911 and seek help immediately. Meds reviewed and patient questions answered today       5. Stage 3a chronic kidney disease (HCC)  Assessment & Plan:  Lab Results   Component Value Date    EGFR 60 2024    EGFR 54 2023    EGFR 54 2023    CREATININE 0.92 2024    CREATININE 1.01 2023    CREATININE 1.01 2023   Pt to avoid NSAIDs and any IV dyes. Patient to follow up eoither with nephrology or  with us for  further  monitoring of  renal function.       6. Age-related osteoporosis without current pathological fracture  -     alendronate (Fosamax) 70 mg tablet; Take 1 tablet (70 mg total) by mouth every 7 days    7. PAD  (peripheral artery disease) (HCC)        Falls Plan of Care: balance, strength, and gait training instructions were provided. Home safety education provided.     Urinary Incontinence Plan of Care: counseling topics discussed: practice Kegel (pelvic floor strengthening) exercises, use restroom every 2 hours, limit alcohol, caffeine, spicy foods, and acidic foods, limiting fluid intake 3-4 hours before bed, weight loss, preventing constipation and limiting fluid intake to 60 oz. per day.       Subjective     78-year-old female today today today for checkup on multimedical problems and review of her lab work.  Patient with history of some anxiety hypertension hyperlipidemia as well as history of some kidney stones and is actually doing very well.  Patient had her lab work reviewed she is doing fantastic her kidney function is normal she has no problem with her sugar her cholesterol is well-controlled and she has no liver involvement with any of her medicines.  Patient will call when she needs refills for prescriptions when appropriate to refill.      Review of Systems   Constitutional:  Negative for activity change, appetite change, fatigue and fever.   HENT:  Negative for congestion, ear pain, postnasal drip, rhinorrhea, sinus pressure, sinus pain, sneezing and sore throat.    Eyes:  Negative for pain and redness.   Respiratory:  Negative for apnea, cough, chest tightness, shortness of breath and wheezing.    Cardiovascular:  Negative for chest pain, palpitations and leg swelling.   Gastrointestinal:  Negative for abdominal pain, constipation, diarrhea, nausea and vomiting.   Endocrine: Negative for cold intolerance and heat intolerance.   Genitourinary:  Negative for difficulty urinating, dysuria, frequency, hematuria and urgency.   Musculoskeletal:  Negative for arthralgias, back pain, gait problem and myalgias.   Skin:  Negative for rash.   Neurological:  Negative for dizziness, speech difficulty, weakness,  numbness and headaches.   Hematological:  Does not bruise/bleed easily.   Psychiatric/Behavioral:  Negative for agitation, confusion and hallucinations.        Past Medical History:   Diagnosis Date   • Anxiety    • Colon polyps    • Essential hypertension 8/8/2018   • Gallbladder polyp    • Kidney cysts    • Kidney stones    • Lung disease      Past Surgical History:   Procedure Laterality Date   • COLONOSCOPY     • COLONOSCOPY W/ POLYPECTOMY     • HI EDG US EXAM SURGICAL ALTER STOM DUODENUM/JEJUNUM N/A 3/14/2019    Procedure: LINEAR ENDOSCOPIC U/S;  Surgeon: Roscoe Malagon MD;  Location:  GI LAB;  Service: Gastroenterology     Family History   Problem Relation Age of Onset   • Hyperlipidemia Mother    • Heart attack Father    • Other Sister         open heart surgery    • Heart murmur Sister    • Stroke Sister    • Hyperlipidemia Sister         all siblings    • Breast cancer Sister 70   • Parkinsonism Sister    • Cancer Sister    • Heart attack Brother         open heart surgery    • Deep vein thrombosis Brother    • Hyperlipidemia Brother    • Coronary artery disease Brother         stents placed    • Heart attack Paternal Aunt    • Heart attack Paternal Uncle    • Hypertension Family         all in the family both sides   • No Known Problems Daughter    • No Known Problems Maternal Grandmother    • No Known Problems Maternal Grandfather    • No Known Problems Paternal Grandmother    • No Known Problems Paternal Grandfather    • No Known Problems Sister    • No Known Problems Brother    • No Known Problems Son    • No Known Problems Son    • Anuerysm Neg Hx    • Heart failure Neg Hx      Social History     Socioeconomic History   • Marital status: /Civil Union     Spouse name: None   • Number of children: None   • Years of education: None   • Highest education level: Bachelor's degree (e.g., BA, AB, BS)   Occupational History   • Occupation: retired   Tobacco Use   • Smoking status: Never     Passive  exposure: Never   • Smokeless tobacco: Never   Vaping Use   • Vaping status: Never Used   Substance and Sexual Activity   • Alcohol use: Yes     Comment: social - rare    • Drug use: No   • Sexual activity: Not Currently     Partners: Male   Other Topics Concern   • None   Social History Narrative    Most recent tobacco use screenin-      Do you currently or have you served in the [employer]:   No      Were you activated, into active duty, as a member of the [employer] or as a Reservist:   No      Occupation:   homemaker      Marital status:         Sexual orientation:   Heterosexual      Exercise level:   Occasional      Diet:   Regular      Alcohol intake:   Occasional      Caffeine intake:   Occasional      Chewing tobacco:   none      Illicit drugs:   denies      Guns present in home:   No      Seat belts used routinely:   Yes      Sunscreen used routinely:   No      stays out of sun; allergic to sunscreen    Live alone or with others:   with others      Smoke alarm in home:   Yes      Advance directive:   No      Has the Patient had a mammogram to screen for breast cancer within 24 months:   Yes      Please enter the date of the Patient's previous mammogram.:   08-      Sexually active:   Yes      Social Determinants of Health     Financial Resource Strain: Low Risk  (2023)    Overall Financial Resource Strain (CARDIA)    • Difficulty of Paying Living Expenses: Not hard at all   Food Insecurity: Not on file   Transportation Needs: No Transportation Needs (2023)    PRAPARE - Transportation    • Lack of Transportation (Medical): No    • Lack of Transportation (Non-Medical): No   Physical Activity: Not on file   Stress: Not on file   Social Connections: Not on file   Intimate Partner Violence: Not on file   Housing Stability: Not on file     Current Outpatient Medications on File Prior to Visit   Medication Sig   • amLODIPine (NORVASC) 5 mg tablet TAKE ONE TABLET BY  "MOUTH ONCE DAILY   • benazepril (LOTENSIN) 40 MG tablet TAKE TWO TABLETS EVERY DAY IN THE EARLY MORNING   • calcium-vitamin D 250-100 MG-UNIT per tablet Take 1 tablet by mouth 2 (two) times a day   • ciclopirox (LOPROX) 0.77 % cream    • Coenzyme Q10 (COQ-10) 100 MG CAPS Take 1 capsule by mouth daily   • escitalopram (LEXAPRO) 10 mg tablet TAKE ONE TABLET BY MOUTH AT BEDTIME   • ezetimibe (ZETIA) 10 mg tablet TAKE ONE TABLET BY MOUTH ONCE DAILY   • Magnesium Oxide -Mg Supplement 400 MG CAPS Take 1 capsule by mouth daily   • metoprolol succinate (TOPROL-XL) 25 mg 24 hr tablet TAKE ONE TABLET BY MOUTH AT BEDTIME   • Omega-3 Fatty Acids (Omega-3 Fish Oil) 500 MG CAPS Take by mouth   • Plant Sterols and Stanols (CHOLESTOFF PO) Take by mouth   • Red Yeast Rice 600 MG CAPS Take 2 capsules by mouth daily   • [DISCONTINUED] clindamycin (CLINDAGEL) 1 % gel Apply topically 2 (two) times a day (Patient not taking: Reported on 1/19/2024)   • [DISCONTINUED] Cyanocobalamin (VITAMIN B12 PO) Take by mouth (Patient not taking: Reported on 12/22/2023)     Allergies   Allergen Reactions   • Imdur [Isosorbide Nitrate] Headache   • Sulfa Antibiotics Hives   • Ciprofloxacin Rash and GI Intolerance   • Macrobid [Nitrofurantoin] Other (See Comments)     Pt does not recall reaction     Immunization History   Administered Date(s) Administered   • COVID-19 PFIZER VACCINE 0.3 ML IM 02/17/2021, 03/09/2021   • INFLUENZA 11/11/2014, 11/18/2016, 11/01/2018, 10/07/2019   • Influenza, high dose seasonal 0.7 mL 12/17/2021   • Pneumococcal Conjugate 13-Valent 11/11/2014   • Pneumococcal Polysaccharide PPV23 11/10/2015       Objective     /68 (BP Location: Left arm, Patient Position: Sitting, Cuff Size: Standard)   Pulse 67   Temp (!) 97 °F (36.1 °C) (Skin)   Ht 5' 2\" (1.575 m)   Wt 58.5 kg (129 lb)   SpO2 96%   BMI 23.59 kg/m²     Physical Exam  Vitals and nursing note reviewed.   Constitutional:       Appearance: She is well-developed. "   HENT:      Head: Normocephalic and atraumatic.      Nose: Nose normal.      Mouth/Throat:      Mouth: Mucous membranes are moist.   Eyes:      General: No scleral icterus.     Conjunctiva/sclera: Conjunctivae normal.      Pupils: Pupils are equal, round, and reactive to light.   Neck:      Thyroid: No thyromegaly.   Cardiovascular:      Rate and Rhythm: Normal rate and regular rhythm.   Pulmonary:      Effort: Pulmonary effort is normal.      Breath sounds: Normal breath sounds. No wheezing.   Abdominal:      General: Bowel sounds are normal. There is no distension.      Palpations: Abdomen is soft.      Tenderness: There is no abdominal tenderness. There is no guarding or rebound.   Musculoskeletal:         General: No tenderness or deformity. Normal range of motion.      Cervical back: Normal range of motion and neck supple.   Skin:     General: Skin is warm and dry.      Findings: No erythema or rash.   Neurological:      Mental Status: She is alert and oriented to person, place, and time.      Sensory: No sensory deficit.   Psychiatric:         Mood and Affect: Mood normal.         Behavior: Behavior normal.         Thought Content: Thought content normal.         Judgment: Judgment normal.       Shayne Ennis MD

## 2024-01-19 NOTE — ASSESSMENT & PLAN NOTE
Lab Results   Component Value Date    EGFR 60 01/06/2024    EGFR 54 07/06/2023    EGFR 54 01/06/2023    CREATININE 0.92 01/06/2024    CREATININE 1.01 07/06/2023    CREATININE 1.01 01/06/2023   Pt to avoid NSAIDs and any IV dyes. Patient to follow up eoither with nephrology or  with us for  further  monitoring of  renal function.

## 2024-01-25 DIAGNOSIS — I10 ESSENTIAL HYPERTENSION: ICD-10-CM

## 2024-01-25 RX ORDER — BENAZEPRIL HYDROCHLORIDE 40 MG/1
TABLET ORAL
Qty: 180 TABLET | Refills: 1 | Status: SHIPPED | OUTPATIENT
Start: 2024-01-25

## 2024-02-04 ENCOUNTER — HOSPITAL ENCOUNTER (EMERGENCY)
Facility: HOSPITAL | Age: 79
Discharge: HOME | End: 2024-02-04
Attending: INTERNAL MEDICINE
Payer: COMMERCIAL

## 2024-02-04 ENCOUNTER — APPOINTMENT (EMERGENCY)
Dept: RADIOLOGY | Facility: HOSPITAL | Age: 79
End: 2024-02-04
Payer: COMMERCIAL

## 2024-02-04 ENCOUNTER — APPOINTMENT (EMERGENCY)
Dept: CT IMAGING | Facility: HOSPITAL | Age: 79
End: 2024-02-04
Payer: COMMERCIAL

## 2024-02-04 VITALS
DIASTOLIC BLOOD PRESSURE: 76 MMHG | RESPIRATION RATE: 20 BRPM | HEART RATE: 68 BPM | TEMPERATURE: 97.5 F | OXYGEN SATURATION: 100 % | SYSTOLIC BLOOD PRESSURE: 152 MMHG

## 2024-02-04 DIAGNOSIS — M79.18 MUSCULOSKELETAL PAIN: Primary | ICD-10-CM

## 2024-02-04 LAB
ATRIAL RATE: 65 BPM
BACTERIA UR QL AUTO: ABNORMAL /HPF
BILIRUB UR QL STRIP: NEGATIVE
CLARITY UR: CLEAR
COLOR UR: YELLOW
GLUCOSE UR STRIP-MCNC: NEGATIVE MG/DL
HGB UR QL STRIP.AUTO: ABNORMAL
KETONES UR STRIP-MCNC: NEGATIVE MG/DL
LEUKOCYTE ESTERASE UR QL STRIP: NEGATIVE
NITRITE UR QL STRIP: NEGATIVE
NON-SQ EPI CELLS URNS QL MICRO: ABNORMAL /HPF
P AXIS: 56 DEGREES
PH UR STRIP.AUTO: 7.5 [PH]
PR INTERVAL: 152 MS
PROT UR STRIP-MCNC: NEGATIVE MG/DL
QRS AXIS: 6 DEGREES
QRSD INTERVAL: 86 MS
QT INTERVAL: 408 MS
QTC INTERVAL: 424 MS
RBC #/AREA URNS AUTO: ABNORMAL /HPF
SP GR UR STRIP.AUTO: 1.01 (ref 1–1.03)
T WAVE AXIS: 50 DEGREES
UROBILINOGEN UR QL STRIP.AUTO: 0.2 E.U./DL
VENTRICULAR RATE: 65 BPM
WBC #/AREA URNS AUTO: ABNORMAL /HPF

## 2024-02-04 PROCEDURE — G1004 CDSM NDSC: HCPCS

## 2024-02-04 PROCEDURE — 81001 URINALYSIS AUTO W/SCOPE: CPT | Performed by: INTERNAL MEDICINE

## 2024-02-04 PROCEDURE — 99284 EMERGENCY DEPT VISIT MOD MDM: CPT

## 2024-02-04 PROCEDURE — 72100 X-RAY EXAM L-S SPINE 2/3 VWS: CPT

## 2024-02-04 PROCEDURE — 71101 X-RAY EXAM UNILAT RIBS/CHEST: CPT

## 2024-02-04 PROCEDURE — 96372 THER/PROPH/DIAG INJ SC/IM: CPT

## 2024-02-04 PROCEDURE — 74176 CT ABD & PELVIS W/O CONTRAST: CPT

## 2024-02-04 PROCEDURE — 99285 EMERGENCY DEPT VISIT HI MDM: CPT | Performed by: INTERNAL MEDICINE

## 2024-02-04 PROCEDURE — 81003 URINALYSIS AUTO W/O SCOPE: CPT | Performed by: INTERNAL MEDICINE

## 2024-02-04 PROCEDURE — 93005 ELECTROCARDIOGRAM TRACING: CPT

## 2024-02-04 RX ORDER — LIDOCAINE 50 MG/G
1 PATCH TOPICAL DAILY
Qty: 7 PATCH | Refills: 0 | Status: SHIPPED | OUTPATIENT
Start: 2024-02-04 | End: 2024-02-11

## 2024-02-04 RX ORDER — NAPROXEN 500 MG/1
500 TABLET ORAL 2 TIMES DAILY WITH MEALS
Qty: 30 TABLET | Refills: 0 | Status: SHIPPED | OUTPATIENT
Start: 2024-02-04

## 2024-02-04 RX ORDER — DIAZEPAM 5 MG/ML
2.5 INJECTION, SOLUTION INTRAMUSCULAR; INTRAVENOUS ONCE
Status: COMPLETED | OUTPATIENT
Start: 2024-02-04 | End: 2024-02-04

## 2024-02-04 RX ORDER — KETOROLAC TROMETHAMINE 30 MG/ML
30 INJECTION, SOLUTION INTRAMUSCULAR; INTRAVENOUS ONCE
Status: COMPLETED | OUTPATIENT
Start: 2024-02-04 | End: 2024-02-04

## 2024-02-04 RX ADMIN — KETOROLAC TROMETHAMINE 30 MG: 30 INJECTION, SOLUTION INTRAMUSCULAR; INTRAVENOUS at 07:20

## 2024-02-04 RX ADMIN — DIAZEPAM 2.5 MG: 5 INJECTION, SOLUTION INTRAMUSCULAR; INTRAVENOUS at 07:20

## 2024-02-04 NOTE — DISCHARGE INSTRUCTIONS
Take medication as prescribed.  Follow-up with your PMD.  Apply warm compresses on it.  CT renal stone study abdomen pelvis wo contrast   Final Result      No hydronephrosis      No left ureteric or renal calculi      Small calcification seen along the course of the right ureter measuring about 3 mm with no correlate on the previous CT of 2019. Probably new phleboliths, less likely ureteral stone in the context of patient having left-sided pain. A nonemergent CT    urogram can be considered for further evaluation         Mild nonspecific prominence of the jejunal loop in the left upper to mid abdomen with mild mesenteric infiltration without bowel obstruction      I personally discussed this study with MONICA MOY on 2/4/2024 8:43 AM.            Workstation performed: [id number]         XR ribs with pa chest min 3 views LEFT   ED Interpretation   Cxr; no acute cardiopulmonary findings.  XR L ribs; no fracture.       XR spine lumbar 2 or 3 views injury   ED Interpretation   XR L/S spine; no osseous abnormalities

## 2024-02-04 NOTE — ED PROVIDER NOTES
History  Chief Complaint   Patient presents with    Back Pain     Back pain onset in the middle of the night     This is a 78 years old came for having back pain.  Patient stated she woke up in the middle of the night with back pain.  Patient pointed the pain is on the left lower ribs on the back.  The pain increased when she takes deep breaths.  Patient has no flank pain but she stated that her urine is dark.  Patient has history of kidney stones in the past.  Patient denies any numbness or tingling of lower extremities.  Patient denies urinary symptoms or rectal symptoms.  Patient denies history of cancer.  Patient has no fever.  Patient was worked up for osteoporosis and she is supposed to start on Fosamax today but she did not take it.  Patient denies any fall or trauma to the back.  Patient stated she has a strong family history of heart disease.        Prior to Admission Medications   Prescriptions Last Dose Informant Patient Reported? Taking?   Coenzyme Q10 (COQ-10) 100 MG CAPS  Self Yes No   Sig: Take 1 capsule by mouth daily   Magnesium Oxide -Mg Supplement 400 MG CAPS  Self Yes No   Sig: Take 1 capsule by mouth daily   Omega-3 Fatty Acids (Omega-3 Fish Oil) 500 MG CAPS  Self Yes No   Sig: Take by mouth   Plant Sterols and Stanols (CHOLESTOFF PO)   Yes No   Sig: Take by mouth   Red Yeast Rice 600 MG CAPS  Self Yes No   Sig: Take 2 capsules by mouth daily   alendronate (Fosamax) 70 mg tablet   No No   Sig: Take 1 tablet (70 mg total) by mouth every 7 days   amLODIPine (NORVASC) 5 mg tablet   No No   Sig: TAKE ONE TABLET BY MOUTH ONCE DAILY   benazepril (LOTENSIN) 40 MG tablet   No No   Sig: TAKE TWO TABLETS EVERY DAY IN THE EARLY MORNING   calcium-vitamin D 250-100 MG-UNIT per tablet  Self Yes No   Sig: Take 1 tablet by mouth 2 (two) times a day   ciclopirox (LOPROX) 0.77 % cream   Yes No   escitalopram (LEXAPRO) 10 mg tablet   No No   Sig: TAKE ONE TABLET BY MOUTH AT BEDTIME   ezetimibe (ZETIA) 10 mg tablet    No No   Sig: TAKE ONE TABLET BY MOUTH ONCE DAILY   metoprolol succinate (TOPROL-XL) 25 mg 24 hr tablet   No No   Sig: TAKE ONE TABLET BY MOUTH AT BEDTIME      Facility-Administered Medications Last Administration Doses Remaining   cyanocobalamin injection 1,000 mcg 6/12/2023  1:12 PM    cyanocobalamin injection 1,000 mcg 10/19/2023  9:28 AM           Past Medical History:   Diagnosis Date    Anxiety     Colon polyps     Essential hypertension 8/8/2018    Gallbladder polyp     Kidney cysts     Kidney stones     Lung disease        Past Surgical History:   Procedure Laterality Date    COLONOSCOPY      COLONOSCOPY W/ POLYPECTOMY      ME EDG US EXAM SURGICAL ALTER STOM DUODENUM/JEJUNUM N/A 3/14/2019    Procedure: LINEAR ENDOSCOPIC U/S;  Surgeon: Roscoe Malagon MD;  Location: [facility];  Service: Gastroenterology       Family History   Problem Relation Age of Onset    Hyperlipidemia Mother     Heart attack Father     Other Sister         open heart surgery     Heart murmur Sister     Stroke Sister     Hyperlipidemia Sister         all siblings     Breast cancer Sister 70    Parkinsonism Sister     Cancer Sister     Heart attack Brother         open heart surgery     Deep vein thrombosis Brother     Hyperlipidemia Brother     Coronary artery disease Brother         stents placed     Heart attack Paternal Aunt     Heart attack Paternal Uncle     Hypertension Family         all in the family both sides    No Known Problems Daughter     No Known Problems Maternal Grandmother     No Known Problems Maternal Grandfather     No Known Problems Paternal Grandmother     No Known Problems Paternal Grandfather     No Known Problems Sister     No Known Problems Brother     No Known Problems Son     No Known Problems Son     Anuerysm Neg Hx     Heart failure Neg Hx      I have reviewed and agree with the history as documented.    E-Cigarette/Vaping    E-Cigarette Use Never User      E-Cigarette/Vaping Substances    Nicotine No     THC  No     CBD No     Flavoring No     Other No     Unknown No      Social History     Tobacco Use    Smoking status: Never     Passive exposure: Never    Smokeless tobacco: Never   Vaping Use    Vaping status: Never Used   Substance Use Topics    Alcohol use: Yes     Comment: social - rare     Drug use: No       Review of Systems   Constitutional:  Negative for fatigue and fever.   HENT:  Negative for congestion, rhinorrhea, sinus pressure, sinus pain, sneezing and sore throat.    Respiratory:  Negative for cough and shortness of breath.    Cardiovascular:  Negative for chest pain and palpitations.   Gastrointestinal:  Negative for abdominal pain, diarrhea, nausea and vomiting.   Genitourinary:  Negative for difficulty urinating, dysuria, flank pain and frequency.   Musculoskeletal:  Positive for back pain. Negative for arthralgias, gait problem, joint swelling, myalgias, neck pain and neck stiffness.   Skin:  Negative for color change, pallor and rash.   Neurological:  Negative for dizziness, light-headedness and headaches.   Psychiatric/Behavioral:  Negative for agitation and behavioral problems.        Physical Exam  Physical Exam  Vitals and nursing note reviewed.   Constitutional:       General: She is not in acute distress.     Appearance: She is well-developed. She is not ill-appearing, toxic-appearing or diaphoretic.   HENT:      Head: Normocephalic and atraumatic.      Nose: Nose normal.      Mouth/Throat:      Mouth: Mucous membranes are moist.      Pharynx: No oropharyngeal exudate.   Eyes:      Extraocular Movements: Extraocular movements intact.      Pupils: Pupils are equal, round, and reactive to light.   Neck:      Vascular: No carotid bruit.   Cardiovascular:      Rate and Rhythm: Normal rate and regular rhythm.      Heart sounds: Normal heart sounds. No murmur heard.     No friction rub. No gallop.   Pulmonary:      Effort: Pulmonary effort is normal. No respiratory distress.      Breath sounds:  Normal breath sounds. No wheezing, rhonchi or rales.   Abdominal:      General: Bowel sounds are normal. There is no distension.      Palpations: Abdomen is soft. There is no mass.      Tenderness: There is no abdominal tenderness. There is no right CVA tenderness, left CVA tenderness, guarding or rebound.      Hernia: No hernia is present.   Musculoskeletal:         General: No swelling, tenderness, deformity or signs of injury. Normal range of motion.      Cervical back: Normal range of motion and neck supple. No rigidity or tenderness.      Right lower leg: No edema.      Left lower leg: No edema.      Comments: Examination of the back has pain at the site of the L ribs #10,11.  The pain increases when she takes deep breath.  No midline tenderness or pain.  No L flank tenderness.  There is mild spasm at the left paraspinal muscles.  No evidence of ecchymosis on the back of the left chest wall.  Examination of the lower extremity sensation intact, neurovascular intact.;   Bilateral SLR is  negative.   Lymphadenopathy:      Cervical: No cervical adenopathy.   Skin:     General: Skin is warm and dry.      Capillary Refill: Capillary refill takes less than 2 seconds.      Coloration: Skin is not jaundiced or pale.      Findings: No bruising, erythema, lesion or rash.   Neurological:      Mental Status: She is alert and oriented to person, place, and time.   Psychiatric:         Behavior: Behavior normal.         Vital Signs  ED Triage Vitals   Temperature Pulse Respirations Blood Pressure SpO2   02/04/24 0651 02/04/24 0650 02/04/24 0650 02/04/24 0650 02/04/24 0650   97.5 °F (36.4 °C) 65 18 151/67 95 %      Temp Source Heart Rate Source Patient Position - Orthostatic VS BP Location FiO2 (%)   02/04/24 0650 02/04/24 0650 02/04/24 0650 02/04/24 0650 --   Oral Monitor Sitting Right arm       Pain Score       02/04/24 0650       4           Vitals:    02/04/24 0650 02/04/24 0841   BP: 151/67 152/76   Pulse: 65 68    Patient Position - Orthostatic VS: Sitting Sitting         Visual Acuity  Visual Acuity      Flowsheet Row Most Recent Value   L Pupil Size (mm) 3   R Pupil Size (mm) 3            ED Medications  Medications   ketorolac (TORADOL) injection 30 mg (30 mg Intramuscular Given 2/4/24 0720)   diazepam (VALIUM) injection 2.5 mg (2.5 mg Intramuscular Given 2/4/24 0720)       Diagnostic Studies  Results Reviewed       Procedure Component Value Units Date/Time    Urine Microscopic [[id number]]  (Abnormal) Collected: 02/04/24 0719    Lab Status: Final result Specimen: Urine, Clean Catch Updated: 02/04/24 0759     RBC, UA 4-10 /hpf      WBC, UA 0-1 /hpf      Epithelial Cells Occasional /hpf      Bacteria, UA Occasional /hpf     UA w Reflex to Microscopic w Reflex to Culture [[id number]]  (Abnormal) Collected: 02/04/24 0719    Lab Status: Final result Specimen: Urine, Clean Catch Updated: 02/04/24 0740     Color, UA Yellow     Clarity, UA Clear     Specific Gravity, UA 1.015     pH, UA 7.5     Leukocytes, UA Negative     Nitrite, UA Negative     Protein, UA Negative mg/dl      Glucose, UA Negative mg/dl      Ketones, UA Negative mg/dl      Urobilinogen, UA 0.2 E.U./dl      Bilirubin, UA Negative     Occult Blood, UA 2+                   CT renal stone study abdomen pelvis wo contrast   Final Result by Rashida Bender MD (02/04 0843)      No hydronephrosis      No left ureteric or renal calculi      Small calcification seen along the course of the right ureter measuring about 3 mm with no correlate on the previous CT of 2019. Probably new phleboliths, less likely ureteral stone in the context of patient having left-sided pain. A nonemergent CT    urogram can be considered for further evaluation         Mild nonspecific prominence of the jejunal loop in the left upper to mid abdomen with mild mesenteric infiltration without bowel obstruction      I personally discussed this study with MONICA MOY on 2/4/2024 8:43 AM.             Workstation performed: [id number]         XR ribs with pa chest min 3 views LEFT   ED Interpretation by Celia Schumacher MD (02/04 0758)   Cxr; no acute cardiopulmonary findings.  XR L ribs; no fracture.       Final Result by Dell Martin MD (02/05 1803)      There is no evidence of a rib fracture. No pneumothorax.      Focal 3.9 cm consolidation versus mass at the right upper lung zone. In the absence of acute respiratory symptoms of pneumonia, recommend further evaluation with contrast-enhanced chest CT. Alternatively, if features of pneumonia are present, repeat    chest radiograph could be performed 2 months after treatment to document resolution.      Note: The study was marked in EPIC for significant notification. Imaging follow-up reminder notification was scheduled in the electronic medical record.      Resident: JESSICA Carolina I, the attending radiologist, have reviewed the images and agree with the final report above.      Workstation performed: [id number]         XR spine lumbar 2 or 3 views injury   ED Interpretation by Celia Schumacher MD (02/04 0752)   XR L/S spine; no osseous abnormalities      Final Result by Dell Martin MD (02/05 6656)      No acute osseous abnormality.      Mild facet arthropathy at L4-5, L5-S1.         Resident: JESSICA Carolina I, the attending radiologist, have reviewed the images and agree with the final report above.      Workstation performed: [id number]                    Procedures  ECG 12 Lead Documentation Only    Date/Time: 2/4/2024 7:22 AM    Performed by: Celia Schumacher MD  Authorized by: Celia Schumacher MD    Indications / Diagnosis:  Upper back pain  ECG reviewed by me, the ED Provider: yes    Patient location:  ED and bedside  Previous ECG:     Previous ECG:  Unavailable  Interpretation:     Interpretation: abnormal    Rate:     ECG rate:  65    ECG rate assessment: normal    Rhythm:     Rhythm: sinus rhythm    Ectopy:     Ectopy:  PAC    QRS:     QRS axis:  Normal    QRS intervals:  Normal  Conduction:     Conduction: normal    ST segments:     ST segments:  Normal  T waves:     T waves: normal             ED Course                                             Medical Decision Making  This is a 78 years old came for having back pain.  Patient pointed the back pain is on the left lower ribs.  The pain increases when she takes deep breaths.  Patient denies any cough.  Patient has no fever.  Patient has this pain in the middle of the night and this first time she has it.  Patient has no history of back pain.  Patient stated her urine is dark.  On the exam she has mild tenderness at the back left ribs #10 and 11.  No tenderness at the midline.  Mild spasm on the left paraspinal muscle.  EKG shows NSR rate 65/min, PAC, no ischemic changes.  CXR; no acute cardiopulmonary findings.  X-ray left ribs no fracture.  XR L/S; no acute osseous abnormalities.  Ct urogram;   No hydronephrosis   No left ureteric or renal calculi   Small calcification seen along the course of the right ureter measuring about 3 mm with no correlate on the previous CT of 2019. Probably new phleboliths, less likely ureteral stone in the context of patient having left-sided pain. A nonemergent CT   urogram can be considered for further evaluation   Mild nonspecific prominence of the jejunal loop in the left upper to mid abdomen with mild mesenteric infiltration without bowel obstruction.  Case discussed with the patient told her with finding of the imaging study.  Patient stated that she feels better but the pain comes when she takes deep breaths.  There is possibility that this could be musculoskeletal pain.  This may need some months.  And Lidoderm patch.  Differential diagnosis include but not limited to; musculoskeletal pain, ureteral colic, nephrolithiasis, pneumonia, pleurisy, fracture ribs.  Pt is feeling better and would like to go home. I rechecked the pt and undated on  the results of the ED findings, including physical exam,diagnosis, and all abnormal test results. I discussed the plan of discharge with the pt and advised to follow up with PCP or to return to the ED for any new or worsening symptoms.pt understands and agrees with the plan of care. All questions and concerns have been addressed at this time.           Amount and/or Complexity of Data Reviewed  Radiology: ordered and independent interpretation performed.     Details: X-ray lumbar spine; no fracture or subluxation no osseous abnormalities.  CXR no acute cardiopulmonary findings.  X-ray left ribs no fracture.  Ct urogram;   No hydronephrosis   No left ureteric or renal calculi   Small calcification seen along the course of the right ureter measuring about 3 mm with no correlate on the previous CT of 2019. Probably new phleboliths, less likely ureteral stone in the context of patient having left-sided pain. A nonemergent CT   urogram can be considered for further evaluation   Mild nonspecific prominence of the jejunal loop in the left upper to mid abdomen with mild mesenteric infiltration without bowel obstruction       ECG/medicine tests: ordered and independent interpretation performed.     Details: EKG; sinus rhythm rate 65/min, PAC, no ischemic changes.    Risk  Prescription drug management.             Disposition  Final diagnoses:   Musculoskeletal pain     Time reflects when diagnosis was documented in both MDM as applicable and the Disposition within this note       Time User Action Codes Description Comment    2/4/2024  9:03 AM Celia Schumacher Add [M79.18] Musculoskeletal pain           ED Disposition       ED Disposition   Discharge    Condition   Stable    Date/Time   Sun Feb 4, 2024  9:03 AM    Comment   Freda Canas discharge to home/self care.                   Follow-up Information       Follow up With Specialties Details Why Contact Info    Shayne Ennis MD Family Medicine In 1 week  [address]  [facility] [address]  [phone]              Discharge Medication List as of 2/4/2024  9:05 AM        START taking these medications    Details   lidocaine (Lidoderm) 5 % Apply 1 patch topically over 12 hours daily for 7 days Remove & Discard patch within 12 hours or as directed by MD, Starting Sun 2/4/2024, Until Sun 2/11/2024, Normal      naproxen (Naprosyn) 500 mg tablet Take 1 tablet (500 mg total) by mouth 2 (two) times a day with meals, Starting Sun 2/4/2024, Normal           CONTINUE these medications which have NOT CHANGED    Details   alendronate (Fosamax) 70 mg tablet Take 1 tablet (70 mg total) by mouth every 7 days, Starting Fri 1/19/2024, Normal      amLODIPine (NORVASC) 5 mg tablet TAKE ONE TABLET BY MOUTH ONCE DAILY, Normal      benazepril (LOTENSIN) 40 MG tablet TAKE TWO TABLETS EVERY DAY IN THE EARLY MORNING, Normal      calcium-vitamin D 250-100 MG-UNIT per tablet Take 1 tablet by mouth 2 (two) times a day, Historical Med      ciclopirox (LOPROX) 0.77 % cream Historical Med      Coenzyme Q10 (COQ-10) 100 MG CAPS Take 1 capsule by mouth daily, Historical Med      escitalopram (LEXAPRO) 10 mg tablet TAKE ONE TABLET BY MOUTH AT BEDTIME, Normal      ezetimibe (ZETIA) 10 mg tablet TAKE ONE TABLET BY MOUTH ONCE DAILY, Normal      Magnesium Oxide -Mg Supplement 400 MG CAPS Take 1 capsule by mouth daily, Historical Med      metoprolol succinate (TOPROL-XL) 25 mg 24 hr tablet TAKE ONE TABLET BY MOUTH AT BEDTIME, Normal      Omega-3 Fatty Acids (Omega-3 Fish Oil) 500 MG CAPS Take by mouth, Historical Med      Plant Sterols and Stanols (CHOLESTOFF PO) Take by mouth, Historical Med      Red Yeast Rice 600 MG CAPS Take 2 capsules by mouth daily, Starting Wed 3/12/2008, Historical Med             No discharge procedures on file.    PDMP Review       None            ED Provider  Electronically Signed by             Celia Schumacher MD  02/05/24 [id number]

## 2024-02-05 ENCOUNTER — TELEPHONE (OUTPATIENT)
Age: 79
End: 2024-02-05

## 2024-02-05 ENCOUNTER — VBI (OUTPATIENT)
Dept: FAMILY MEDICINE CLINIC | Facility: CLINIC | Age: 79
End: 2024-02-05

## 2024-02-05 NOTE — TELEPHONE ENCOUNTER
02/05/24 10:47 AM    Patient contacted post ED visit, [facility] department spoke with patient/caregiver and outreach was successful.    Thank you.  Norman Holloway MA  [facility]

## 2024-02-05 NOTE — TELEPHONE ENCOUNTER
Pt stated she went to ER on 2/4/24. Pt wanted to schedule a follow up within 1 week.  However, her PCP does not have appts until March.     Tried contacting office but no one was available.       Please schedule an appt for pt. Thank you for your help.

## 2024-02-11 NOTE — PROGRESS NOTES
Name: Freda Canas      : 1945      MRN: [id number]  Encounter Provider: Shayne Ennis MD  Encounter Date: 2024   Encounter department: [facility]    Assessment & Plan     1. Essential hypertension  Assessment & Plan:  Patient is stable with current anti-hypertensive medicine and continue to follow a low sodium diet and take current medication. All questions about this condition were answered today.       2. Anxiety disorder, unspecified type  Assessment & Plan:  Patient to continue utilizing medical therapy as well as counseling sources as applicable to condition. If suicidal thought or fear of suicide attempt, to call 911 and seek help immediately. Medications and therapy reviewed today and all patient  questions answered today.       3. Mixed hyperlipidemia  Assessment & Plan:  Patient  is stable with current medication and we discussed a low fat low cholesterol diet. Weight loss also discussed for this will help lower cholesterol also. Recheck lipids in 6 months.       4. Major depressive disorder with single episode, in remission (HCC)  Assessment & Plan:  Patient to continue utilizing medical therapy as well and counseling sources as applicable for condition. If  suicidal thought or fear of suicide to contact 911 and seek help immediately. Meds reviewed and patient questions answered today       5. Stage 3a chronic kidney disease (HCC)  Assessment & Plan:  Lab Results   Component Value Date    EGFR 60 2024    EGFR 54 2023    EGFR 54 2023    CREATININE 0.92 2024    CREATININE 1.01 2023    CREATININE 1.01 2023   Pt to avoid NSAIDs and any IV dyes. Patient to follow up eoither with nephrology or  with us for  further  monitoring of  renal function.       6. Abnormal CXR  -     CT chest w contrast; Future; Expected date: 2024      Falls Plan of Care: balance, strength, and gait training instructions were provided. Home  safety education provided.     Urinary Incontinence Plan of Care: counseling topics discussed: practice Kegel (pelvic floor strengthening) exercises, use restroom every 2 hours, limit alcohol, caffeine, spicy foods, and acidic foods, limiting fluid intake 3-4 hours before bed, weight loss, preventing constipation and limiting fluid intake to 60 oz. per day.         Subjective     Is a 78-year-old female here today for checkup on recent ER visit.  Patient was in with some left-sided chest discomfort and was density was found on the chest x-ray on the right side of her chest.  Patient did not have pneumonia but she is here for follow-up on this density.  Patient with no cough weight loss or shortness of breath no fever was recommended she have a CT scan of the chest with contrast which we will do for today.  Patient has very good kidney function she should have no problem taking any contrast she will drink plenty of water prior to the test and she will get this done and we will see her back after is completed to review that with her.      Review of Systems   Constitutional:  Negative for activity change, appetite change, fatigue and fever.   HENT:  Negative for congestion, ear pain, postnasal drip, rhinorrhea, sinus pressure, sinus pain, sneezing and sore throat.    Eyes:  Negative for pain and redness.   Respiratory:  Negative for apnea, cough, chest tightness, shortness of breath and wheezing.    Cardiovascular:  Negative for chest pain, palpitations and leg swelling.   Gastrointestinal:  Negative for abdominal pain, constipation, diarrhea, nausea and vomiting.   Endocrine: Negative for cold intolerance and heat intolerance.   Genitourinary:  Negative for difficulty urinating, dysuria, frequency, hematuria and urgency.   Musculoskeletal:  Negative for arthralgias, back pain, gait problem and myalgias.   Skin:  Negative for rash.   Neurological:  Negative for dizziness, speech difficulty, weakness, numbness and  headaches.   Hematological:  Does not bruise/bleed easily.   Psychiatric/Behavioral:  Negative for agitation, confusion and hallucinations.        Past Medical History:   Diagnosis Date   • Anxiety    • Colon polyps    • Essential hypertension 8/8/2018   • Gallbladder polyp    • Kidney cysts    • Kidney stones    • Lung disease      Past Surgical History:   Procedure Laterality Date   • COLONOSCOPY     • COLONOSCOPY W/ POLYPECTOMY     • [place] EDG US EXAM SURGICAL ALTER STOM DUODENUM/JEJUNUM N/A 3/14/2019    Procedure: LINEAR ENDOSCOPIC U/S;  Surgeon: Roscoe Malagon MD;  Location: [facility];  Service: Gastroenterology     Family History   Problem Relation Age of Onset   • Hyperlipidemia Mother    • Heart attack Father    • Other Sister         open heart surgery    • Heart murmur Sister    • Stroke Sister    • Hyperlipidemia Sister         all siblings    • Breast cancer Sister 70   • Parkinsonism Sister    • Cancer Sister    • Heart attack Brother         open heart surgery    • Deep vein thrombosis Brother    • Hyperlipidemia Brother    • Coronary artery disease Brother         stents placed    • Heart attack Paternal Aunt    • Heart attack Paternal Uncle    • Hypertension Family         all in the family both sides   • No Known Problems Daughter    • No Known Problems Maternal Grandmother    • No Known Problems Maternal Grandfather    • No Known Problems Paternal Grandmother    • No Known Problems Paternal Grandfather    • No Known Problems Sister    • No Known Problems Brother    • No Known Problems Son    • No Known Problems Son    • Anuerysm Neg Hx    • Heart failure Neg Hx      Social History     Socioeconomic History   • Marital status: /Civil Union     Spouse name: None   • Number of children: None   • Years of education: None   • Highest education level: Bachelor's degree (e.g., BA, AB, BS)   Occupational History   • Occupation: retired   Tobacco Use   • Smoking status: Never     Passive exposure: Never    • Smokeless tobacco: Never   Vaping Use   • Vaping status: Never Used   Substance and Sexual Activity   • Alcohol use: Yes     Comment: social - rare    • Drug use: No   • Sexual activity: Not Currently     Partners: Male   Other Topics Concern   • None   Social History Narrative    Most recent tobacco use screenin-      Do you currently or have you served in the [employer]:   No      Were you activated, into active duty, as a member of the [employer] or as a Reservist:   No      Occupation:   homemaker      Marital status:         Sexual orientation:   Heterosexual      Exercise level:   Occasional      Diet:   Regular      Alcohol intake:   Occasional      Caffeine intake:   Occasional      Chewing tobacco:   none      Illicit drugs:   denies      Guns present in home:   No      Seat belts used routinely:   Yes      Sunscreen used routinely:   No      stays out of sun; allergic to sunscreen    Live alone or with others:   with others      Smoke alarm in home:   Yes      Advance directive:   No      Has the Patient had a mammogram to screen for breast cancer within 24 months:   Yes      Please enter the date of the Patient's previous mammogram.:   08-      Sexually active:   Yes      Social Determinants of Health     Financial Resource Strain: Low Risk  (2023)    Overall Financial Resource Strain (CARDIA)    • Difficulty of Paying Living Expenses: Not hard at all   Food Insecurity: Not on file   Transportation Needs: No Transportation Needs (2023)    PRAPARE - Transportation    • Lack of Transportation (Medical): No    • Lack of Transportation (Non-Medical): No   Physical Activity: Not on file   Stress: Not on file   Social Connections: Not on file   Intimate Partner Violence: Not on file   Housing Stability: Not on file     Current Outpatient Medications on File Prior to Visit   Medication Sig   • alendronate (Fosamax) 70 mg tablet Take 1 tablet (70 mg total) by  mouth every 7 days   • amLODIPine (NORVASC) 5 mg tablet TAKE ONE TABLET BY MOUTH ONCE DAILY   • benazepril (LOTENSIN) 40 MG tablet TAKE TWO TABLETS EVERY DAY IN THE EARLY MORNING   • calcium-vitamin D 250-100 MG-UNIT per tablet Take 1 tablet by mouth 2 (two) times a day   • ciclopirox (LOPROX) 0.77 % cream    • Coenzyme Q10 (COQ-10) 100 MG CAPS Take 1 capsule by mouth daily   • escitalopram (LEXAPRO) 10 mg tablet TAKE ONE TABLET BY MOUTH AT BEDTIME   • ezetimibe (ZETIA) 10 mg tablet TAKE ONE TABLET BY MOUTH ONCE DAILY   • lidocaine (Lidoderm) 5 % Apply 1 patch topically over 12 hours daily for 7 days Remove & Discard patch within 12 hours or as directed by MD   • Magnesium Oxide -Mg Supplement 400 MG CAPS Take 1 capsule by mouth daily   • metoprolol succinate (TOPROL-XL) 25 mg 24 hr tablet TAKE ONE TABLET BY MOUTH AT BEDTIME   • naproxen (Naprosyn) 500 mg tablet Take 1 tablet (500 mg total) by mouth 2 (two) times a day with meals   • Omega-3 Fatty Acids (Omega-3 Fish Oil) 500 MG CAPS Take by mouth   • Plant Sterols and Stanols (CHOLESTOFF PO) Take by mouth   • Red Yeast Rice 600 MG CAPS Take 2 capsules by mouth daily     Allergies   Allergen Reactions   • Imdur [Isosorbide Nitrate] Headache   • Sulfa Antibiotics Hives   • Ciprofloxacin Rash and GI Intolerance   • Macrobid [Nitrofurantoin] Other (See Comments)     Pt does not recall reaction     Immunization History   Administered Date(s) Administered   • COVID-19 PFIZER VACCINE 0.3 ML IM 02/17/2021, 03/09/2021, 11/02/2021   • COVID-19 Pfizer mRNA vacc PF maxime-sucrose 12 yr and older (Comirnaty) 10/31/2023   • COVID-19 Pfizer vac (Maxime-sucrose, gray cap) 12 yr+ IM 07/21/2022   • INFLUENZA 11/11/2014, 11/18/2016, 11/01/2018, 10/07/2019   • Influenza, high dose seasonal 0.7 mL 12/17/2021   • Pneumococcal Conjugate 13-Valent 11/11/2014   • Pneumococcal Polysaccharide PPV23 11/10/2015       Objective     /84 (BP Location: Left arm, Patient Position: Sitting, Cuff  "Size: Standard)   Pulse 68   Temp (!) 97.4 °F (36.3 °C)   Ht 5' 2\" (1.575 m)   Wt 58.1 kg (128 lb)   SpO2 96%   BMI 23.41 kg/m²     Physical Exam  Vitals and nursing note reviewed.   Constitutional:       Appearance: She is well-developed.   HENT:      Head: Normocephalic and atraumatic.      Nose: Nose normal.      Mouth/Throat:      Mouth: Mucous membranes are moist.   Eyes:      General: No scleral icterus.     Conjunctiva/sclera: Conjunctivae normal.      Pupils: Pupils are equal, round, and reactive to light.   Neck:      Thyroid: No thyromegaly.   Cardiovascular:      Rate and Rhythm: Normal rate and regular rhythm.   Pulmonary:      Effort: Pulmonary effort is normal.      Breath sounds: Normal breath sounds. No wheezing.   Abdominal:      General: Bowel sounds are normal. There is no distension.      Palpations: Abdomen is soft.      Tenderness: There is no abdominal tenderness. There is no guarding or rebound.   Musculoskeletal:         General: No tenderness or deformity. Normal range of motion.      Cervical back: Normal range of motion and neck supple.   Skin:     General: Skin is warm and dry.      Findings: No erythema or rash.   Neurological:      Mental Status: She is alert and oriented to person, place, and time.      Sensory: No sensory deficit.   Psychiatric:         Mood and Affect: Mood normal.         Behavior: Behavior normal.         Thought Content: Thought content normal.         Judgment: Judgment normal.       Shayne Ennis MD    "

## 2024-02-12 ENCOUNTER — OFFICE VISIT (OUTPATIENT)
Dept: FAMILY MEDICINE CLINIC | Facility: CLINIC | Age: 79
End: 2024-02-12
Payer: COMMERCIAL

## 2024-02-12 VITALS
TEMPERATURE: 97.4 F | HEART RATE: 68 BPM | SYSTOLIC BLOOD PRESSURE: 136 MMHG | WEIGHT: 128 LBS | OXYGEN SATURATION: 96 % | DIASTOLIC BLOOD PRESSURE: 84 MMHG | BODY MASS INDEX: 23.55 KG/M2 | HEIGHT: 62 IN

## 2024-02-12 DIAGNOSIS — I10 ESSENTIAL HYPERTENSION: Primary | ICD-10-CM

## 2024-02-12 DIAGNOSIS — R93.89 ABNORMAL CXR: ICD-10-CM

## 2024-02-12 DIAGNOSIS — N18.31 STAGE 3A CHRONIC KIDNEY DISEASE (HCC): ICD-10-CM

## 2024-02-12 DIAGNOSIS — F41.9 ANXIETY DISORDER, UNSPECIFIED TYPE: ICD-10-CM

## 2024-02-12 DIAGNOSIS — E78.2 MIXED HYPERLIPIDEMIA: ICD-10-CM

## 2024-02-12 DIAGNOSIS — F32.5 MAJOR DEPRESSIVE DISORDER WITH SINGLE EPISODE, IN REMISSION (HCC): ICD-10-CM

## 2024-02-12 PROCEDURE — 99213 OFFICE O/P EST LOW 20 MIN: CPT | Performed by: FAMILY MEDICINE

## 2024-02-14 DIAGNOSIS — I10 ESSENTIAL HYPERTENSION: ICD-10-CM

## 2024-02-15 RX ORDER — AMLODIPINE BESYLATE 5 MG/1
TABLET ORAL
Qty: 90 TABLET | Refills: 0 | Status: SHIPPED | OUTPATIENT
Start: 2024-02-15

## 2024-02-21 ENCOUNTER — HOSPITAL ENCOUNTER (OUTPATIENT)
Dept: RADIOLOGY | Facility: MEDICAL CENTER | Age: 79
Discharge: HOME | End: 2024-02-21
Payer: COMMERCIAL

## 2024-02-21 DIAGNOSIS — R93.89 ABNORMAL CXR: ICD-10-CM

## 2024-02-21 PROCEDURE — G1004 CDSM NDSC: HCPCS

## 2024-02-21 PROCEDURE — 71260 CT THORAX DX C+: CPT

## 2024-02-21 RX ADMIN — IOHEXOL 85 ML: 350 INJECTION, SOLUTION INTRAVENOUS at 09:22

## 2024-02-23 ENCOUNTER — OFFICE VISIT (OUTPATIENT)
Dept: FAMILY MEDICINE CLINIC | Facility: CLINIC | Age: 79
End: 2024-02-23
Payer: COMMERCIAL

## 2024-02-23 ENCOUNTER — TELEPHONE (OUTPATIENT)
Dept: HEMATOLOGY ONCOLOGY | Facility: CLINIC | Age: 79
End: 2024-02-23

## 2024-02-23 ENCOUNTER — DOCUMENTATION (OUTPATIENT)
Dept: HEMATOLOGY ONCOLOGY | Facility: CLINIC | Age: 79
End: 2024-02-23

## 2024-02-23 VITALS
TEMPERATURE: 98 F | HEART RATE: 73 BPM | SYSTOLIC BLOOD PRESSURE: 130 MMHG | OXYGEN SATURATION: 99 % | HEIGHT: 62 IN | RESPIRATION RATE: 16 BRPM | DIASTOLIC BLOOD PRESSURE: 68 MMHG | BODY MASS INDEX: 23.19 KG/M2 | WEIGHT: 126 LBS

## 2024-02-23 DIAGNOSIS — N18.31 STAGE 3A CHRONIC KIDNEY DISEASE (HCC): ICD-10-CM

## 2024-02-23 DIAGNOSIS — I10 ESSENTIAL HYPERTENSION: ICD-10-CM

## 2024-02-23 DIAGNOSIS — F41.9 ANXIETY DISORDER, UNSPECIFIED TYPE: ICD-10-CM

## 2024-02-23 DIAGNOSIS — F32.5 MAJOR DEPRESSIVE DISORDER WITH SINGLE EPISODE, IN REMISSION (HCC): ICD-10-CM

## 2024-02-23 DIAGNOSIS — R93.89 ABNORMAL CT OF THE CHEST: Primary | ICD-10-CM

## 2024-02-23 DIAGNOSIS — E78.2 MIXED HYPERLIPIDEMIA: ICD-10-CM

## 2024-02-23 PROCEDURE — 99214 OFFICE O/P EST MOD 30 MIN: CPT | Performed by: FAMILY MEDICINE

## 2024-02-23 NOTE — PROGRESS NOTES
Intake received/ Chart reviewed for completion of workup     Pathology completed: No     Imaging completed: 02/21/24 CT chest      All records needed are in patients chart. No records retrieval needed at this time.     Forwarded to care coordinator for scheduling of Thoracic Surgery consult within 5 days.      NN outreach not needed at this time.  OCC to outreach with appointment.

## 2024-02-23 NOTE — PROGRESS NOTES
Name: Freda Canas      : 1945      MRN: [id number]  Encounter Provider: Shayne Ennis MD  Encounter Date: 2024   Encounter department: [facility]    Assessment & Plan     1. Abnormal CT of the chest  -     Ambulatory Referral to Thoracic Surgery; Future  -     Ambulatory Referral to Thoracic Oncology; Future    2. Essential hypertension  Assessment & Plan:  Patient is stable with current anti-hypertensive medicine and continue to follow a low sodium diet and take current medication. All questions about this condition were answered today.       3. Anxiety disorder, unspecified type  Assessment & Plan:  Patient to continue utilizing medical therapy as well as counseling sources as applicable to condition. If suicidal thought or fear of suicide attempt, to call 911 and seek help immediately. Medications and therapy reviewed today and all patient  questions answered today.       4. Mixed hyperlipidemia  Assessment & Plan:  Patient  is stable with current medication and we discussed a low fat low cholesterol diet. Weight loss also discussed for this will help lower cholesterol also. Recheck lipids in 6 months.       5. Major depressive disorder with single episode, in remission (HCC)  Assessment & Plan:  Patient to continue utilizing medical therapy as well and counseling sources as applicable for condition. If  suicidal thought or fear of suicide to contact 911 and seek help immediately. Meds reviewed and patient questions answered today       6. Stage 3a chronic kidney disease (HCC)  Assessment & Plan:  Lab Results   Component Value Date    EGFR 60 2024    EGFR 54 2023    EGFR 54 2023    CREATININE 0.92 2024    CREATININE 1.01 2023    CREATININE 1.01 2023   Pt to avoid NSAIDs and any IV dyes. Patient to follow up eoither with nephrology or  with us for  further  monitoring of  renal function.           Falls Plan of Care: balance,  strength, and gait training instructions were provided. Home safety education provided.     Urinary Incontinence Plan of Care: counseling topics discussed: practice Kegel (pelvic floor strengthening) exercises, use restroom every 2 hours, limit alcohol, caffeine, spicy foods, and acidic foods, limiting fluid intake 3-4 hours before bed, weight loss, preventing constipation and limiting fluid intake to 60 oz. per day.       Subjective     78-year-old female here today for review of her recent CT scan patient recently had a x-ray of her chest in regards to a problem she was having with her shoulder and when she was found to have a suspicious lesion on the right side.  Patient had a CAT scan which showed a suspicious lesion suspected of being a cancer on the right side.  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      Review of Systems   Constitutional:  Negative for activity change, appetite change, fatigue and fever.   HENT:  Negative for congestion, ear pain, postnasal drip, rhinorrhea, sinus pressure, sinus pain, sneezing and sore throat.    Eyes:  Negative for pain and redness.   Respiratory:  Negative for apnea, cough, chest tightness, shortness of breath and wheezing.    Cardiovascular:  Negative for chest pain, palpitations and leg swelling.   Gastrointestinal:  Negative for abdominal pain, constipation, diarrhea, nausea and vomiting.   Endocrine: Negative for cold intolerance and heat intolerance.   Genitourinary:  Negative for difficulty urinating, dysuria, frequency, hematuria and urgency.   Musculoskeletal:  Negative for arthralgias, back pain, gait problem and myalgias.   Skin:  Negative for rash.   Neurological:  Negative for dizziness,  speech difficulty, weakness, numbness and headaches.   Hematological:  Does not bruise/bleed easily.   Psychiatric/Behavioral:  Negative for agitation, confusion and hallucinations.        Past Medical History:   Diagnosis Date   • Anxiety    • Colon polyps    • Essential hypertension 8/8/2018   • Gallbladder polyp    • Kidney cysts    • Kidney stones    • Lung disease      Past Surgical History:   Procedure Laterality Date   • COLONOSCOPY     • COLONOSCOPY W/ POLYPECTOMY     • [place] EDG US EXAM SURGICAL ALTER STOM DUODENUM/JEJUNUM N/A 3/14/2019    Procedure: LINEAR ENDOSCOPIC U/S;  Surgeon: Roscoe Malagon MD;  Location: [facility];  Service: Gastroenterology     Family History   Problem Relation Age of Onset   • Hyperlipidemia Mother    • Heart attack Father    • Other Sister         open heart surgery    • Heart murmur Sister    • Stroke Sister    • Hyperlipidemia Sister         all siblings    • Breast cancer Sister 70   • Parkinsonism Sister    • Cancer Sister    • Heart attack Brother         open heart surgery    • Deep vein thrombosis Brother    • Hyperlipidemia Brother    • Coronary artery disease Brother         stents placed    • Heart attack Paternal Aunt    • Heart attack Paternal Uncle    • Hypertension Family         all in the family both sides   • No Known Problems Daughter    • No Known Problems Maternal Grandmother    • No Known Problems Maternal Grandfather    • No Known Problems Paternal Grandmother    • No Known Problems Paternal Grandfather    • No Known Problems Sister    • No Known Problems Brother    • No Known Problems Son    • No Known Problems Son    • Anuerysm Neg Hx    • Heart failure Neg Hx      Social History     Socioeconomic History   • Marital status: /Civil Union     Spouse name: None   • Number of children: None   • Years of education: None   • Highest education level: Bachelor's degree (e.g., BA, AB, BS)   Occupational History   • Occupation: retired   Tobacco Use   • Smoking  status: Never     Passive exposure: Never   • Smokeless tobacco: Never   Vaping Use   • Vaping status: Never Used   Substance and Sexual Activity   • Alcohol use: Yes     Comment: social - rare    • Drug use: No   • Sexual activity: Not Currently     Partners: Male   Other Topics Concern   • None   Social History Narrative    Most recent tobacco use screenin-      Do you currently or have you served in the [employer]:   No      Were you activated, into active duty, as a member of the [employer] or as a Reservist:   No      Occupation:   homemaker      Marital status:         Sexual orientation:   Heterosexual      Exercise level:   Occasional      Diet:   Regular      Alcohol intake:   Occasional      Caffeine intake:   Occasional      Chewing tobacco:   none      Illicit drugs:   denies      Guns present in home:   No      Seat belts used routinely:   Yes      Sunscreen used routinely:   No      stays out of sun; allergic to sunscreen    Live alone or with others:   with others      Smoke alarm in home:   Yes      Advance directive:   No      Has the Patient had a mammogram to screen for breast cancer within 24 months:   Yes      Please enter the date of the Patient's previous mammogram.:   08-      Sexually active:   Yes      Social Determinants of Health     Financial Resource Strain: Low Risk  (2023)    Overall Financial Resource Strain (CARDIA)    • Difficulty of Paying Living Expenses: Not hard at all   Food Insecurity: Not on file   Transportation Needs: No Transportation Needs (2023)    PRAPARE - Transportation    • Lack of Transportation (Medical): No    • Lack of Transportation (Non-Medical): No   Physical Activity: Not on file   Stress: Not on file   Social Connections: Not on file   Intimate Partner Violence: Not on file   Housing Stability: Not on file     Current Outpatient Medications on File Prior to Visit   Medication Sig   • alendronate (Fosamax) 70 mg  tablet Take 1 tablet (70 mg total) by mouth every 7 days   • amLODIPine (NORVASC) 5 mg tablet TAKE ONE TABLET BY MOUTH ONCE DAILY   • benazepril (LOTENSIN) 40 MG tablet TAKE TWO TABLETS EVERY DAY IN THE EARLY MORNING   • calcium-vitamin D 250-100 MG-UNIT per tablet Take 1 tablet by mouth 2 (two) times a day   • ciclopirox (LOPROX) 0.77 % cream    • Coenzyme Q10 (COQ-10) 100 MG CAPS Take 1 capsule by mouth daily   • escitalopram (LEXAPRO) 10 mg tablet TAKE ONE TABLET BY MOUTH AT BEDTIME   • ezetimibe (ZETIA) 10 mg tablet TAKE ONE TABLET BY MOUTH ONCE DAILY   • Magnesium Oxide -Mg Supplement 400 MG CAPS Take 1 capsule by mouth daily   • metoprolol succinate (TOPROL-XL) 25 mg 24 hr tablet TAKE ONE TABLET BY MOUTH AT BEDTIME   • naproxen (Naprosyn) 500 mg tablet Take 1 tablet (500 mg total) by mouth 2 (two) times a day with meals   • Omega-3 Fatty Acids (Omega-3 Fish Oil) 500 MG CAPS Take by mouth   • Plant Sterols and Stanols (CHOLESTOFF PO) Take by mouth   • Red Yeast Rice 600 MG CAPS Take 2 capsules by mouth daily   • lidocaine (Lidoderm) 5 % Apply 1 patch topically over 12 hours daily for 7 days Remove & Discard patch within 12 hours or as directed by MD     Allergies   Allergen Reactions   • Imdur [Isosorbide Nitrate] Headache   • Sulfa Antibiotics Hives   • Ciprofloxacin Rash and GI Intolerance   • Macrobid [Nitrofurantoin] Other (See Comments)     Pt does not recall reaction     Immunization History   Administered Date(s) Administered   • COVID-19 PFIZER VACCINE 0.3 ML IM 02/17/2021, 03/09/2021, 11/02/2021   • COVID-19 Pfizer mRNA vacc PF maxime-sucrose 12 yr and older (Comirnaty) 10/31/2023   • COVID-19 Pfizer vac (Maxime-sucrose, gray cap) 12 yr+ IM 07/21/2022   • INFLUENZA 11/11/2014, 11/18/2016, 11/01/2018, 10/07/2019, 09/09/2020, 10/18/2022, 10/05/2023   • Influenza, high dose seasonal 0.7 mL 12/17/2021   • Pneumococcal Conjugate 13-Valent 11/11/2014   • Pneumococcal Polysaccharide PPV23 11/10/2015   • Zoster  "Vaccine Recombinant 10/18/2022, 01/12/2023       Objective     /68 (BP Location: Left arm, Patient Position: Sitting, Cuff Size: Standard)   Pulse 73   Temp 98 °F (36.7 °C) (Temporal)   Resp 16   Ht 5' 2\" (1.575 m)   Wt 57.2 kg (126 lb)   SpO2 99%   BMI 23.05 kg/m²     Physical Exam  Vitals and nursing note reviewed.   Constitutional:       Appearance: She is well-developed.   HENT:      Head: Normocephalic and atraumatic.      Nose: Nose normal.      Mouth/Throat:      Mouth: Mucous membranes are moist.   Eyes:      General: No scleral icterus.     Conjunctiva/sclera: Conjunctivae normal.      Pupils: Pupils are equal, round, and reactive to light.   Neck:      Thyroid: No thyromegaly.   Cardiovascular:      Rate and Rhythm: Normal rate and regular rhythm.   Pulmonary:      Effort: Pulmonary effort is normal.      Breath sounds: Normal breath sounds. No wheezing.   Abdominal:      General: Bowel sounds are normal. There is no distension.      Palpations: Abdomen is soft.      Tenderness: There is no abdominal tenderness. There is no guarding or rebound.   Musculoskeletal:         General: No tenderness or deformity. Normal range of motion.      Cervical back: Normal range of motion and neck supple.   Skin:     General: Skin is warm and dry.      Findings: No erythema or rash.   Neurological:      Mental Status: She is alert and oriented to person, place, and time.      Sensory: No sensory deficit.   Psychiatric:         Mood and Affect: Mood normal.         Behavior: Behavior normal.         Thought Content: Thought content normal.         Judgment: Judgment normal.       Shayne Ennis MD    "

## 2024-02-26 ENCOUNTER — PATIENT OUTREACH (OUTPATIENT)
Dept: HEMATOLOGY ONCOLOGY | Facility: CLINIC | Age: 79
End: 2024-02-26

## 2024-02-26 ENCOUNTER — TELEPHONE (OUTPATIENT)
Dept: HEMATOLOGY ONCOLOGY | Facility: CLINIC | Age: 79
End: 2024-02-26

## 2024-02-26 NOTE — TELEPHONE ENCOUNTER
I called Freda in response to a referral that was received for patient to establish care with Thoracic Surgery.     Outreach was made to complete patient's intake questionnaire .    Patient's intake questionnaire was reviewed and complete. Patient's intake has been sent to the team for clinical review.

## 2024-02-26 NOTE — TELEPHONE ENCOUNTER
Appointment Change  Cancel, Reschedule, Change to Virtual      Who are you speaking with? Patient   If it is not the patient, is the caller listed on the communication consent form? N/A   Which provider is the appointment scheduled with? Dr. Pedraza   When was the original appointment scheduled?    Please list date and time 3/7/24 3pm   At which location is the appointment scheduled to take place? [place]   Was the appointment rescheduled?     Was the appointment changed from an in person visit to a virtual visit?    If so, please list the details of the change. 2/28/24 [facility] [place]   What is the reason for the appointment change? Pt needs to be seen within 7 days per RN       Was STAR transport scheduled? N/A   Does STAR transport need to be scheduled for the new visit (if applicable) N/A   Does the patient need an infusion appointment rescheduled? N/A   Does the patient have an upcoming infusion appointment scheduled? If so, when? No   Is the patient undergoing chemotherapy? N/A   For appointments cancelled with less than 24 hours:  Was the no-show policy reviewed? N/A

## 2024-02-28 ENCOUNTER — CONSULT (OUTPATIENT)
Dept: CARDIAC SURGERY | Facility: CLINIC | Age: 79
End: 2024-02-28
Payer: COMMERCIAL

## 2024-02-28 ENCOUNTER — DOCUMENTATION (OUTPATIENT)
Dept: CARDIAC SURGERY | Facility: CLINIC | Age: 79
End: 2024-02-28

## 2024-02-28 VITALS
DIASTOLIC BLOOD PRESSURE: 71 MMHG | HEIGHT: 62 IN | OXYGEN SATURATION: 98 % | BODY MASS INDEX: 23.25 KG/M2 | RESPIRATION RATE: 14 BRPM | HEART RATE: 75 BPM | WEIGHT: 126.32 LBS | SYSTOLIC BLOOD PRESSURE: 139 MMHG | TEMPERATURE: 98.3 F

## 2024-02-28 DIAGNOSIS — R91.8 MASS OF UPPER LOBE OF RIGHT LUNG: ICD-10-CM

## 2024-02-28 PROCEDURE — 99205 OFFICE O/P NEW HI 60 MIN: CPT | Performed by: THORACIC SURGERY (CARDIOTHORACIC VASCULAR SURGERY)

## 2024-02-28 NOTE — ASSESSMENT & PLAN NOTE
Ms. Canas presents with new findings of a right upper lobe lung mass. XR images reviewed. CT scan images reviewed demonstrating this right upper lobe mass and an enlarged lymph node. Dr. Ontiveros discussed that this is suspicious for a lung cancer but still could represent other pathology including infection. Dr. Ontiveros recommending biopsy to with navigational bronchoscopy, EBUS, flexible bronchoscopy. Procedure, risks, and benfits were described in detail. Consent signed. Additionally, Dr. Ontiveros is recommedning a PET scan and PFTs. These tests were described to the patient.  Patient has an upcoming appointment scheduled with medical oncologist Dr. Garcia scheduled for 3/15/2024.  We are tentatively scheduling patient's navigational bronchoscopy for 3/12/2024 and patient can maintain her appointment with Dr. Garcia.  She also has an appointment scheduled with her cardiologist on 3/12/2024; I will send cardiologist note today that patient will need to reschedule. Patient in agreement with plan.

## 2024-02-28 NOTE — PROGRESS NOTES
Thoracic Consult  Assessment/Plan:    Mass of upper lobe of right lung  Ms. Canas presents with new findings of a right upper lobe lung mass. XR images reviewed. CT scan images reviewed demonstrating this right upper lobe mass and an enlarged lymph node. Dr. Ontiveros discussed that this is suspicious for a lung cancer but still could represent other pathology including infection. Dr. Ontiveros recommending biopsy to with navigational bronchoscopy, EBUS, flexible bronchoscopy. Procedure, risks, and benfits were described in detail. Consent signed. Additionally, Dr. Ontiveros is recommedning a PET scan and PFTs. These tests were described to the patient.  Patient has an upcoming appointment scheduled with medical oncologist Dr. Garcia scheduled for 3/15/2024.  We are tentatively scheduling patient's navigational bronchoscopy for 3/12/2024 and patient can maintain her appointment with Dr. Garcia.  She also has an appointment scheduled with her cardiologist on 3/12/2024; I will send cardiologist note today that patient will need to reschedule. Patient in agreement with plan.         Diagnoses and all orders for this visit:    Mass of upper lobe of right lung  -     Ambulatory Referral to Thoracic Surgery  -     NM PET CT skull base to mid thigh; Future  -     Complete PFT with post bronchodilator; Future  -     Case request operating room: BRONCHOSCOPY NAVIGATIONAL, ENDOBRONCHIAL ULTRASOUND (EBUS), BRONCHOSCOPY FLEXIBLE; Standing  -     Case request operating room: BRONCHOSCOPY NAVIGATIONAL, ENDOBRONCHIAL ULTRASOUND (EBUS), BRONCHOSCOPY FLEXIBLE    Other orders  -     Diet NPO; Sips with meds; Standing  -     Void on call to OR; Standing  -     Insert peripheral IV; Standing  -     Place sequential compression device; Standing          Thoracic History   Diagnosis: Right upper lobe lung mass   Procedures/Surgeries:    Pathology:    Adjuvant Therapy:       Subjective:    Patient ID: Freda Canas is a 78 y.o.  female. ECOG 0    HPI    Freda Canas is a 78 y.o. female with a PMH of HTN, HLD, CKD3, and is a never smoker who was referred to our office by PCP for evaluation of a right upper lobe lung mass.     CT scan chest performed on 2/21/2024 was personally reviewed by myself and in PACS and reveals solid right upper lobe mass measures 3.6 x 3.8 cm. The mass has spiculated margins and contacts the lateral pleural surface. There is a 0.3 cm left lower lobe juxtapleural nodule, a 0.3 cm right upper lobe juxta fissural nodule, and a 0.3 cm calcified left lower lobe granuloma. There is a 1.3 x 1.3 cm right hilar lymph node.      On discussion today, patient reports she initially had left shoulder and back pain that promoted an XR of the ribs showing a right upper lobe mass. This left shoulder and back pain comes and goes. Reports her breathing does not typically affect her daily activities. She takes steps in her home many times each day. Goes to gym several times weekly. Bowls regularly. Denies cough, hemoptysis. Denies unintentional weight loss. Had recent post nasal drip that is resolving. Denies recent lung infections. Denies any history of significant lung infections requiring hospitalization.  No personal history of cancer. Sister had breast cancer. No family history of lung cancer. No family history of lung issues. Never smoker. No previous chest surgeries. Patient is not on any anticoagulation.      The following portions of the patient's history were reviewed and updated as appropriate: allergies, current medications, past family history, past medical history, past social history, past surgical history, and problem list.    Past Medical History:   Diagnosis Date    Anxiety     Colon polyps     Essential hypertension 8/8/2018    Gallbladder polyp     Kidney cysts     Kidney stones     Lung disease       Past Surgical History:   Procedure Laterality Date    COLONOSCOPY      COLONOSCOPY W/ POLYPECTOMY      MO EDG  US EXAM SURGICAL ALTER STOM DUODENUM/JEJUNUM N/A 3/14/2019    Procedure: LINEAR ENDOSCOPIC U/S;  Surgeon: Roscoe Malagon MD;  Location: [facility];  Service: Gastroenterology      Family History   Problem Relation Age of Onset    Hyperlipidemia Mother     Heart attack Father     Other Sister         open heart surgery     Heart murmur Sister     Stroke Sister     Hyperlipidemia Sister         all siblings     Breast cancer Sister 70    Parkinsonism Sister     Cancer Sister     Heart attack Brother         open heart surgery     Deep vein thrombosis Brother     Hyperlipidemia Brother     Coronary artery disease Brother         stents placed     Heart attack Paternal Aunt     Heart attack Paternal Uncle     Hypertension Family         all in the family both sides    No Known Problems Daughter     No Known Problems Maternal Grandmother     No Known Problems Maternal Grandfather     No Known Problems Paternal Grandmother     No Known Problems Paternal Grandfather     No Known Problems Sister     No Known Problems Brother     No Known Problems Son     No Known Problems Son     Anuerysm Neg Hx     Heart failure Neg Hx       Social History     Socioeconomic History    Marital status: /Civil Union     Spouse name: Not on file    Number of children: Not on file    Years of education: Not on file    Highest education level: Bachelor's degree (e.g., BA, AB, BS)   Occupational History    Occupation: retired   Tobacco Use    Smoking status: Never     Passive exposure: Never    Smokeless tobacco: Never   Vaping Use    Vaping status: Never Used   Substance and Sexual Activity    Alcohol use: Yes     Comment: social - rare     Drug use: No    Sexual activity: Not Currently     Partners: Male   Other Topics Concern    Not on file   Social History Narrative    Most recent tobacco use screenin-      Do you currently or have you served in the [employer]:   No      Were you activated, into active duty, as a member  of the [employer] or as a Reservist:   No      Occupation:   homemaker      Marital status:         Sexual orientation:   Heterosexual      Exercise level:   Occasional      Diet:   Regular      Alcohol intake:   Occasional      Caffeine intake:   Occasional      Chewing tobacco:   none      Illicit drugs:   denies      Guns present in home:   No      Seat belts used routinely:   Yes      Sunscreen used routinely:   No      stays out of sun; allergic to sunscreen    Live alone or with others:   with others      Smoke alarm in home:   Yes      Advance directive:   No      Has the Patient had a mammogram to screen for breast cancer within 24 months:   Yes      Please enter the date of the Patient's previous mammogram.:   08-      Sexually active:   Yes      Social Determinants of Health     Financial Resource Strain: Low Risk  (7/14/2023)    Overall Financial Resource Strain (CARDIA)     Difficulty of Paying Living Expenses: Not hard at all   Food Insecurity: Not on file   Transportation Needs: No Transportation Needs (7/14/2023)    PRAPARE - Transportation     Lack of Transportation (Medical): No     Lack of Transportation (Non-Medical): No   Physical Activity: Not on file   Stress: Not on file   Social Connections: Not on file   Intimate Partner Violence: Not on file   Housing Stability: Not on file      Review of Systems   Constitutional:  Negative for chills (nothing out of the norm but always cold), fever and unexpected weight change.   HENT:  Negative for sore throat.    Eyes:         Developing cataracts     Respiratory:  Negative for cough and shortness of breath.    Gastrointestinal:  Negative for abdominal pain.   Musculoskeletal:  Positive for arthralgias (left shoulder) and back pain.   Neurological:  Negative for headaches.         Objective:   Physical Exam  Constitutional:       General: She is not in acute distress.  HENT:      Head: Normocephalic and atraumatic.      Nose: Nose  "normal.   Eyes:      Extraocular Movements: Extraocular movements intact.   Cardiovascular:      Rate and Rhythm: Normal rate and regular rhythm.   Pulmonary:      Effort: Pulmonary effort is normal.      Breath sounds: Normal breath sounds.   Abdominal:      Palpations: Abdomen is soft.      Tenderness: There is no abdominal tenderness.   Musculoskeletal:      Right lower leg: No edema.      Left lower leg: No edema.   Skin:     General: Skin is warm and dry.     /71 (BP Location: Left arm, Patient Position: Sitting, Cuff Size: Standard)   Pulse 75   Temp 98.3 °F (36.8 °C) (Temporal)   Resp 14   Ht 5' 2\" (1.575 m)   Wt 57.3 kg (126 lb 5.2 oz)   SpO2 98%   BMI 23.10 kg/m²     No Chest XR results available for this patient.   CT chest w contrast    Result Date: 2/21/2024  Narrative CT CHEST WITH IV CONTRAST INDICATION: R93.89: Abnormal findings on diagnostic imaging of other specified body structures. COMPARISON: No prior CT chest. Correlation with radiographs of the left ribs 2/4/2024 TECHNIQUE: CT examination of the chest was performed. Multiplanar 2D reformatted images were created from the source data. This examination, like all CT scans performed in the [facility], was performed utilizing techniques to minimize radiation dose exposure, including the use of iterative reconstruction and automated exposure control. Radiation dose length product (DLP) for this visit: 175 mGy-cm IV Contrast: 85 mL of iohexol (OMNIPAQUE) FINDINGS: LUNGS: Solid right upper lobe mass measures 3.6 x 3.8 cm (series 3, image 79). There are air bronchograms and ectatic airways (for example series 4, image 42). The mass has spiculated margins and contacts the lateral pleural surface. 0.3 cm left lower lobe juxtapleural nodule (series 4, image 67). 0.3 cm right upper lobe juxta fissural nodule (series 4, image 51; series 602, image 44). 0.3 cm calcified left lower lobe granuloma (series 3, image 172) There is no " tracheal or endobronchial lesion. PLEURA: Unremarkable. HEART/GREAT VESSELS: Heart is normal size. Coronary artery calcifications. No thoracic aortic aneurysm. Atherosclerotic calcifications of the thoracic aorta. MEDIASTINUM AND DUNCAN: 1.3 x 1.3 cm right hilar lymph node (series 2, image 53). CHEST WALL AND LOWER NECK: Unremarkable. VISUALIZED STRUCTURES IN THE UPPER ABDOMEN: Unremarkable. OSSEOUS STRUCTURES: No acute fracture or destructive osseous lesion.     Impression Right upper lobe mass is concerning for a primary lung cancer. Mildly enlarged right hilar lymph node may be metastatic or reactive. Tissue sampling is advised. I personally discussed this study with PRINCESS PENA on 2/21/2024 12:12 PM. Resident: LEWIS DAVEY I, the attending radiologist, have reviewed the images and agree with the final report above. Workstation performed: [id number]     No CT Chest,Abdomen,Pelvis results available for this patient.   No NM PET CT results available for this patient.   No Barium Swallow results available for this patient.

## 2024-02-28 NOTE — H&P (VIEW-ONLY)
Thoracic Consult  Assessment/Plan:    Mass of upper lobe of right lung  Ms. Canas presents with new findings of a right upper lobe lung mass. XR images reviewed. CT scan images reviewed demonstrating this right upper lobe mass and an enlarged lymph node. Dr. Ontiveros discussed that this is suspicious for a lung cancer but still could represent other pathology including infection. Dr. Ontiveros recommending biopsy to with navigational bronchoscopy, EBUS, flexible bronchoscopy. Procedure, risks, and benfits were described in detail. Consent signed. Additionally, Dr. Ontiveros is recommedning a PET scan and PFTs. These tests were described to the patient.  Patient has an upcoming appointment scheduled with medical oncologist Dr. Garcia scheduled for 3/15/2024.  We are tentatively scheduling patient's navigational bronchoscopy for 3/12/2024 and patient can maintain her appointment with Dr. Garcia.  She also has an appointment scheduled with her cardiologist on 3/12/2024; I will send cardiologist note today that patient will need to reschedule. Patient in agreement with plan.         Diagnoses and all orders for this visit:    Mass of upper lobe of right lung  -     Ambulatory Referral to Thoracic Surgery  -     NM PET CT skull base to mid thigh; Future  -     Complete PFT with post bronchodilator; Future  -     Case request operating room: BRONCHOSCOPY NAVIGATIONAL, ENDOBRONCHIAL ULTRASOUND (EBUS), BRONCHOSCOPY FLEXIBLE; Standing  -     Case request operating room: BRONCHOSCOPY NAVIGATIONAL, ENDOBRONCHIAL ULTRASOUND (EBUS), BRONCHOSCOPY FLEXIBLE    Other orders  -     Diet NPO; Sips with meds; Standing  -     Void on call to OR; Standing  -     Insert peripheral IV; Standing  -     Place sequential compression device; Standing          Thoracic History   Diagnosis: Right upper lobe lung mass   Procedures/Surgeries:    Pathology:    Adjuvant Therapy:       Subjective:    Patient ID: Freda Canas is a 78 y.o.  female. ECOG 0    HPI    Freda Canas is a 78 y.o. female with a PMH of HTN, HLD, CKD3, and is a never smoker who was referred to our office by PCP for evaluation of a right upper lobe lung mass.     CT scan chest performed on 2/21/2024 was personally reviewed by myself and in PACS and reveals solid right upper lobe mass measures 3.6 x 3.8 cm. The mass has spiculated margins and contacts the lateral pleural surface. There is a 0.3 cm left lower lobe juxtapleural nodule, a 0.3 cm right upper lobe juxta fissural nodule, and a 0.3 cm calcified left lower lobe granuloma. There is a 1.3 x 1.3 cm right hilar lymph node.      On discussion today, patient reports she initially had left shoulder and back pain that promoted an XR of the ribs showing a right upper lobe mass. This left shoulder and back pain comes and goes. Reports her breathing does not typically affect her daily activities. She takes steps in her home many times each day. Goes to gym several times weekly. Bowls regularly. Denies cough, hemoptysis. Denies unintentional weight loss. Had recent post nasal drip that is resolving. Denies recent lung infections. Denies any history of significant lung infections requiring hospitalization.  No personal history of cancer. Sister had breast cancer. No family history of lung cancer. No family history of lung issues. Never smoker. No previous chest surgeries. Patient is not on any anticoagulation.      The following portions of the patient's history were reviewed and updated as appropriate: allergies, current medications, past family history, past medical history, past social history, past surgical history, and problem list.    Past Medical History:   Diagnosis Date    Anxiety     Colon polyps     Essential hypertension 8/8/2018    Gallbladder polyp     Kidney cysts     Kidney stones     Lung disease       Past Surgical History:   Procedure Laterality Date    COLONOSCOPY      COLONOSCOPY W/ POLYPECTOMY      NE EDG  US EXAM SURGICAL ALTER STOM DUODENUM/JEJUNUM N/A 3/14/2019    Procedure: LINEAR ENDOSCOPIC U/S;  Surgeon: Roscoe Malagon MD;  Location: [facility];  Service: Gastroenterology      Family History   Problem Relation Age of Onset    Hyperlipidemia Mother     Heart attack Father     Other Sister         open heart surgery     Heart murmur Sister     Stroke Sister     Hyperlipidemia Sister         all siblings     Breast cancer Sister 70    Parkinsonism Sister     Cancer Sister     Heart attack Brother         open heart surgery     Deep vein thrombosis Brother     Hyperlipidemia Brother     Coronary artery disease Brother         stents placed     Heart attack Paternal Aunt     Heart attack Paternal Uncle     Hypertension Family         all in the family both sides    No Known Problems Daughter     No Known Problems Maternal Grandmother     No Known Problems Maternal Grandfather     No Known Problems Paternal Grandmother     No Known Problems Paternal Grandfather     No Known Problems Sister     No Known Problems Brother     No Known Problems Son     No Known Problems Son     Anuerysm Neg Hx     Heart failure Neg Hx       Social History     Socioeconomic History    Marital status: /Civil Union     Spouse name: Not on file    Number of children: Not on file    Years of education: Not on file    Highest education level: Bachelor's degree (e.g., BA, AB, BS)   Occupational History    Occupation: retired   Tobacco Use    Smoking status: Never     Passive exposure: Never    Smokeless tobacco: Never   Vaping Use    Vaping status: Never Used   Substance and Sexual Activity    Alcohol use: Yes     Comment: social - rare     Drug use: No    Sexual activity: Not Currently     Partners: Male   Other Topics Concern    Not on file   Social History Narrative    Most recent tobacco use screenin-      Do you currently or have you served in the [employer]:   No      Were you activated, into active duty, as a member  of the [employer] or as a Reservist:   No      Occupation:   homemaker      Marital status:         Sexual orientation:   Heterosexual      Exercise level:   Occasional      Diet:   Regular      Alcohol intake:   Occasional      Caffeine intake:   Occasional      Chewing tobacco:   none      Illicit drugs:   denies      Guns present in home:   No      Seat belts used routinely:   Yes      Sunscreen used routinely:   No      stays out of sun; allergic to sunscreen    Live alone or with others:   with others      Smoke alarm in home:   Yes      Advance directive:   No      Has the Patient had a mammogram to screen for breast cancer within 24 months:   Yes      Please enter the date of the Patient's previous mammogram.:   08-      Sexually active:   Yes      Social Determinants of Health     Financial Resource Strain: Low Risk  (7/14/2023)    Overall Financial Resource Strain (CARDIA)     Difficulty of Paying Living Expenses: Not hard at all   Food Insecurity: Not on file   Transportation Needs: No Transportation Needs (7/14/2023)    PRAPARE - Transportation     Lack of Transportation (Medical): No     Lack of Transportation (Non-Medical): No   Physical Activity: Not on file   Stress: Not on file   Social Connections: Not on file   Intimate Partner Violence: Not on file   Housing Stability: Not on file      Review of Systems   Constitutional:  Negative for chills (nothing out of the norm but always cold), fever and unexpected weight change.   HENT:  Negative for sore throat.    Eyes:         Developing cataracts     Respiratory:  Negative for cough and shortness of breath.    Gastrointestinal:  Negative for abdominal pain.   Musculoskeletal:  Positive for arthralgias (left shoulder) and back pain.   Neurological:  Negative for headaches.         Objective:   Physical Exam  Constitutional:       General: She is not in acute distress.  HENT:      Head: Normocephalic and atraumatic.      Nose: Nose  "normal.   Eyes:      Extraocular Movements: Extraocular movements intact.   Cardiovascular:      Rate and Rhythm: Normal rate and regular rhythm.   Pulmonary:      Effort: Pulmonary effort is normal.      Breath sounds: Normal breath sounds.   Abdominal:      Palpations: Abdomen is soft.      Tenderness: There is no abdominal tenderness.   Musculoskeletal:      Right lower leg: No edema.      Left lower leg: No edema.   Skin:     General: Skin is warm and dry.     /71 (BP Location: Left arm, Patient Position: Sitting, Cuff Size: Standard)   Pulse 75   Temp 98.3 °F (36.8 °C) (Temporal)   Resp 14   Ht 5' 2\" (1.575 m)   Wt 57.3 kg (126 lb 5.2 oz)   SpO2 98%   BMI 23.10 kg/m²     No Chest XR results available for this patient.   CT chest w contrast    Result Date: 2/21/2024  Narrative CT CHEST WITH IV CONTRAST INDICATION: R93.89: Abnormal findings on diagnostic imaging of other specified body structures. COMPARISON: No prior CT chest. Correlation with radiographs of the left ribs 2/4/2024 TECHNIQUE: CT examination of the chest was performed. Multiplanar 2D reformatted images were created from the source data. This examination, like all CT scans performed in the [facility], was performed utilizing techniques to minimize radiation dose exposure, including the use of iterative reconstruction and automated exposure control. Radiation dose length product (DLP) for this visit: 175 mGy-cm IV Contrast: 85 mL of iohexol (OMNIPAQUE) FINDINGS: LUNGS: Solid right upper lobe mass measures 3.6 x 3.8 cm (series 3, image 79). There are air bronchograms and ectatic airways (for example series 4, image 42). The mass has spiculated margins and contacts the lateral pleural surface. 0.3 cm left lower lobe juxtapleural nodule (series 4, image 67). 0.3 cm right upper lobe juxta fissural nodule (series 4, image 51; series 602, image 44). 0.3 cm calcified left lower lobe granuloma (series 3, image 172) There is no " tracheal or endobronchial lesion. PLEURA: Unremarkable. HEART/GREAT VESSELS: Heart is normal size. Coronary artery calcifications. No thoracic aortic aneurysm. Atherosclerotic calcifications of the thoracic aorta. MEDIASTINUM AND DUNCAN: 1.3 x 1.3 cm right hilar lymph node (series 2, image 53). CHEST WALL AND LOWER NECK: Unremarkable. VISUALIZED STRUCTURES IN THE UPPER ABDOMEN: Unremarkable. OSSEOUS STRUCTURES: No acute fracture or destructive osseous lesion.     Impression Right upper lobe mass is concerning for a primary lung cancer. Mildly enlarged right hilar lymph node may be metastatic or reactive. Tissue sampling is advised. I personally discussed this study with PRINCESS PENA on 2/21/2024 12:12 PM. Resident: LEWIS DAVEY I, the attending radiologist, have reviewed the images and agree with the final report above. Workstation performed: [id number]     No CT Chest,Abdomen,Pelvis results available for this patient.   No NM PET CT results available for this patient.   No Barium Swallow results available for this patient.

## 2024-02-28 NOTE — LETTER
February 28, 2024     Apolinar Monte MD  [address] [facility] [address]    Patient: Freda Canas   YOB: 1945   Date of Visit: 2/28/2024       Dear Dr. Monet:    Freda Canas was seen in evaluation today in thoracic surgery office for right upper lobe lung mass.  We are planning for biopsy of this mass on 3/12/2024 which is when patient has an upcoming appointment with you. I am sending this to coordinate rescheduling patient's appointment with your office.  Below are my notes for this consultation.    If you have questions, please do not hesitate to call me. I look forward to following your patient along with you.         Sincerely,        Brayan Ontiveros MD        CC: No Recipients    Rhiannon Londono PA-C  2/28/2024 11:02 AM  Sign when Signing Visit  Thoracic Consult  Assessment/Plan:    Mass of upper lobe of right lung  Ms. Canas presents with new findings of a right upper lobe lung mass. XR images reviewed. CT scan images reviewed demonstrating this right upper lobe mass and an enlarged lymph node. Dr. Ontiveros discussed that this is suspicious for a lung cancer but still could represent other pathology including infection. Dr. Ontiveros recommending biopsy to with navigational bronchoscopy, EBUS, flexible bronchoscopy. Procedure, risks, and benfits were described in detail. Consent signed. Additionally, Dr. Ontiveros is recommedning a PET scan and PFTs. These tests were described to the patient.  Patient has an upcoming appointment scheduled with medical oncologist Dr. Garcia scheduled for 3/15/2024.  We are tentatively scheduling patient's navigational bronchoscopy for 3/12/2024 and patient can maintain her appointment with Dr. Garcia.  She also has an appointment scheduled with her cardiologist on 3/12/2024; I will send cardiologist note today that patient will need to reschedule. Patient in agreement with plan.         Diagnoses and all orders for this  visit:    Mass of upper lobe of right lung  -     Ambulatory Referral to Thoracic Surgery  -     NM PET CT skull base to mid thigh; Future  -     Complete PFT with post bronchodilator; Future  -     Case request operating room: BRONCHOSCOPY NAVIGATIONAL, ENDOBRONCHIAL ULTRASOUND (EBUS), BRONCHOSCOPY FLEXIBLE; Standing  -     Case request operating room: BRONCHOSCOPY NAVIGATIONAL, ENDOBRONCHIAL ULTRASOUND (EBUS), BRONCHOSCOPY FLEXIBLE    Other orders  -     Diet NPO; Sips with meds; Standing  -     Void on call to OR; Standing  -     Insert peripheral IV; Standing  -     Place sequential compression device; Standing          Thoracic History  Diagnosis: Right upper lobe lung mass   Procedures/Surgeries:    Pathology:    Adjuvant Therapy:       Subjective:    Patient ID: Freda Canas is a 78 y.o. female. ECOG 0    HPI    Freda Canas is a 78 y.o. female with a PMH of HTN, HLD, CKD3, and is a never smoker who was referred to our office by PCP for evaluation of a right upper lobe lung mass.     CT scan chest performed on 2/21/2024 was personally reviewed by myself and in PACS and reveals solid right upper lobe mass measures 3.6 x 3.8 cm. The mass has spiculated margins and contacts the lateral pleural surface. There is a 0.3 cm left lower lobe juxtapleural nodule, a 0.3 cm right upper lobe juxta fissural nodule, and a 0.3 cm calcified left lower lobe granuloma. There is a 1.3 x 1.3 cm right hilar lymph node.      On discussion today, patient reports she initially had left shoulder and back pain that promoted an XR of the ribs showing a right upper lobe mass. This left shoulder and back pain comes and goes. Reports her breathing does not typically affect her daily activities. She takes steps in her home many times each day. Goes to gym several times weekly. Bowls regularly. Denies cough, hemoptysis. Denies unintentional weight loss. Had recent post nasal drip that is resolving. Denies recent lung infections.  Denies any history of significant lung infections requiring hospitalization.  No personal history of cancer. Sister had breast cancer. No family history of lung cancer. No family history of lung issues. Never smoker. No previous chest surgeries. Patient is not on any anticoagulation.      The following portions of the patient's history were reviewed and updated as appropriate: allergies, current medications, past family history, past medical history, past social history, past surgical history, and problem list.    Past Medical History:   Diagnosis Date   • Anxiety    • Colon polyps    • Essential hypertension 8/8/2018   • Gallbladder polyp    • Kidney cysts    • Kidney stones    • Lung disease       Past Surgical History:   Procedure Laterality Date   • COLONOSCOPY     • COLONOSCOPY W/ POLYPECTOMY     • CO EDG US EXAM SURGICAL ALTER STOM DUODENUM/JEJUNUM N/A 3/14/2019    Procedure: LINEAR ENDOSCOPIC U/S;  Surgeon: Roscoe Malagon MD;  Location:  GI LAB;  Service: Gastroenterology      Family History   Problem Relation Age of Onset   • Hyperlipidemia Mother    • Heart attack Father    • Other Sister         open heart surgery    • Heart murmur Sister    • Stroke Sister    • Hyperlipidemia Sister         all siblings    • Breast cancer Sister 70   • Parkinsonism Sister    • Cancer Sister    • Heart attack Brother         open heart surgery    • Deep vein thrombosis Brother    • Hyperlipidemia Brother    • Coronary artery disease Brother         stents placed    • Heart attack Paternal Aunt    • Heart attack Paternal Uncle    • Hypertension Family         all in the family both sides   • No Known Problems Daughter    • No Known Problems Maternal Grandmother    • No Known Problems Maternal Grandfather    • No Known Problems Paternal Grandmother    • No Known Problems Paternal Grandfather    • No Known Problems Sister    • No Known Problems Brother    • No Known Problems Son    • No Known Problems Son    • Anuerysm Neg Hx     • Heart failure Neg Hx       Social History     Socioeconomic History   • Marital status: /Civil Union     Spouse name: Not on file   • Number of children: Not on file   • Years of education: Not on file   • Highest education level: Bachelor's degree (e.g., BA, AB, BS)   Occupational History   • Occupation: retired   Tobacco Use   • Smoking status: Never     Passive exposure: Never   • Smokeless tobacco: Never   Vaping Use   • Vaping status: Never Used   Substance and Sexual Activity   • Alcohol use: Yes     Comment: social - rare    • Drug use: No   • Sexual activity: Not Currently     Partners: Male   Other Topics Concern   • Not on file   Social History Narrative    Most recent tobacco use screenin-      Do you currently or have you served in the [employer]:   No      Were you activated, into active duty, as a member of the [employer] or as a Reservist:   No      Occupation:   homemaker      Marital status:         Sexual orientation:   Heterosexual      Exercise level:   Occasional      Diet:   Regular      Alcohol intake:   Occasional      Caffeine intake:   Occasional      Chewing tobacco:   none      Illicit drugs:   denies      Guns present in home:   No      Seat belts used routinely:   Yes      Sunscreen used routinely:   No      stays out of sun; allergic to sunscreen    Live alone or with others:   with others      Smoke alarm in home:   Yes      Advance directive:   No      Has the Patient had a mammogram to screen for breast cancer within 24 months:   Yes      Please enter the date of the Patient's previous mammogram.:   08-      Sexually active:   Yes      Social Determinants of Health     Financial Resource Strain: Low Risk  (2023)    Overall Financial Resource Strain (CARDIA)    • Difficulty of Paying Living Expenses: Not hard at all   Food Insecurity: Not on file   Transportation Needs: No Transportation Needs (2023)    PRAPARE -  "Transportation    • Lack of Transportation (Medical): No    • Lack of Transportation (Non-Medical): No   Physical Activity: Not on file   Stress: Not on file   Social Connections: Not on file   Intimate Partner Violence: Not on file   Housing Stability: Not on file      Review of Systems   Constitutional:  Negative for chills (nothing out of the norm but always cold), fever and unexpected weight change.   HENT:  Negative for sore throat.    Eyes:         Developing cataracts     Respiratory:  Negative for cough and shortness of breath.    Gastrointestinal:  Negative for abdominal pain.   Musculoskeletal:  Positive for arthralgias (left shoulder) and back pain.   Neurological:  Negative for headaches.         Objective:   Physical Exam  Constitutional:       General: She is not in acute distress.  HENT:      Head: Normocephalic and atraumatic.      Nose: Nose normal.   Eyes:      Extraocular Movements: Extraocular movements intact.   Cardiovascular:      Rate and Rhythm: Normal rate and regular rhythm.   Pulmonary:      Effort: Pulmonary effort is normal.      Breath sounds: Normal breath sounds.   Abdominal:      Palpations: Abdomen is soft.      Tenderness: There is no abdominal tenderness.   Musculoskeletal:      Right lower leg: No edema.      Left lower leg: No edema.   Skin:     General: Skin is warm and dry.     /71 (BP Location: Left arm, Patient Position: Sitting, Cuff Size: Standard)   Pulse 75   Temp 98.3 °F (36.8 °C) (Temporal)   Resp 14   Ht 5' 2\" (1.575 m)   Wt 57.3 kg (126 lb 5.2 oz)   SpO2 98%   BMI 23.10 kg/m²     No Chest XR results available for this patient.   CT chest w contrast    Result Date: 2/21/2024  Narrative CT CHEST WITH IV CONTRAST INDICATION: R93.89: Abnormal findings on diagnostic imaging of other specified body structures. COMPARISON: No prior CT chest. Correlation with radiographs of the left ribs 2/4/2024 TECHNIQUE: CT examination of the chest was performed. Multiplanar " 2D reformatted images were created from the source data. This examination, like all CT scans performed in the [facility], was performed utilizing techniques to minimize radiation dose exposure, including the use of iterative reconstruction and automated exposure control. Radiation dose length product (DLP) for this visit: 175 mGy-cm IV Contrast: 85 mL of iohexol (OMNIPAQUE) FINDINGS: LUNGS: Solid right upper lobe mass measures 3.6 x 3.8 cm (series 3, image 79). There are air bronchograms and ectatic airways (for example series 4, image 42). The mass has spiculated margins and contacts the lateral pleural surface. 0.3 cm left lower lobe juxtapleural nodule (series 4, image 67). 0.3 cm right upper lobe juxta fissural nodule (series 4, image 51; series 602, image 44). 0.3 cm calcified left lower lobe granuloma (series 3, image 172) There is no tracheal or endobronchial lesion. PLEURA: Unremarkable. HEART/GREAT VESSELS: Heart is normal size. Coronary artery calcifications. No thoracic aortic aneurysm. Atherosclerotic calcifications of the thoracic aorta. MEDIASTINUM AND DUNCAN: 1.3 x 1.3 cm right hilar lymph node (series 2, image 53). CHEST WALL AND LOWER NECK: Unremarkable. VISUALIZED STRUCTURES IN THE UPPER ABDOMEN: Unremarkable. OSSEOUS STRUCTURES: No acute fracture or destructive osseous lesion.     Impression Right upper lobe mass is concerning for a primary lung cancer. Mildly enlarged right hilar lymph node may be metastatic or reactive. Tissue sampling is advised. I personally discussed this study with PRINCESS PENA on 2/21/2024 12:12 PM. Resident: LEWIS DAVEY I, the attending radiologist, have reviewed the images and agree with the final report above. Workstation performed: [id number]     No CT Chest,Abdomen,Pelvis results available for this patient.   No NM PET CT results available for this patient.   No Barium Swallow results available for this patient.

## 2024-02-28 NOTE — PROGRESS NOTES
I met with Freda, her daughter and  at her visit today. I introduced myself and explained my role to her. Questions answered, support provided. She was given my business card for any future questions.

## 2024-03-05 RX ORDER — BIOTIN 5 MG
TABLET ORAL
COMMUNITY

## 2024-03-05 NOTE — PRE-PROCEDURE INSTRUCTIONS
Pre-Surgery Instructions:   Medication Instructions    amLODIPine (NORVASC) 5 mg tablet Take Day of Surgery; unless usually taken at night     benazepril (LOTENSIN) 40 MG tablet Do not take day of surgery; continue as prescribed excluding DOS     Biotin 5 MG TABS Avoid 1 week prior to surgery      calcium-vitamin D 250-100 MG-UNIT per tablet Avoid 1 week prior to surgery      Coenzyme Q10 (COQ-10) 100 MG CAPS Avoid 1 week prior to surgery      escitalopram (LEXAPRO) 10 mg tablet Avoid 1 week prior to surgery      ezetimibe (ZETIA) 10 mg tablet Take Day of Surgery; unless usually taken at night     Magnesium Oxide -Mg Supplement 400 MG CAPS Avoid 1 week prior to surgery      metoprolol succinate (TOPROL-XL) 25 mg 24 hr tablet Take Day of Surgery; unless usually taken at night     Omega-3 Fatty Acids (Omega-3 Fish Oil) 500 MG CAPS Avoid 1 week prior to surgery      Plant Sterols and Stanols (CHOLESTOFF PO) Avoid 1 week prior to surgery      Red Yeast Rice 600 MG CAPS Avoid 1 week prior to surgery      Medication instructions for day surgery reviewed. Please use only a sip of water to take your instructed medications. Avoid all over the counter vitamins, supplements and NSAIDS for one week prior to surgery per anesthesia guidelines. Tylenol is ok to take as needed.     You will receive a call one business day prior to surgery with an arrival time and hospital directions. If your surgery is scheduled on a Monday, the hospital will be calling you on the Friday prior to your surgery. If you have not heard from anyone by 8pm, please call the hospital supervisor through the hospital  at [phone]. ([place] [phone] or [place] [phone]).    Do not eat or drink anything after midnight the night before your surgery, including candy, mints, lifesavers, or chewing gum. Do not drink alcohol 24hrs before your surgery. Try not to smoke at least 24hrs before your surgery.       Follow the pre surgery showering  instructions as listed in the “My Surgical Experience Booklet” or otherwise provided by your surgeon's office. Do not use a blade to shave the surgical area 1 week before surgery. It is okay to use a clean electric clippers up to 24 hours before surgery. Do not apply any lotions, creams, including makeup, cologne, deodorant, or perfumes after showering on the day of your surgery. Do not use dry shampoo, hair spray, hair gel, or any type of hair products.     No contact lenses, eye make-up, or artificial eyelashes. Remove nail polish, including gel polish, and any artificial, gel, or acrylic nails if possible. Remove all jewelry including rings and body piercing jewelry.     Wear causal clothing that is easy to take on and off. Consider your type of surgery.    Keep any valuables, jewelry, piercings at home. Please bring any specially ordered equipment (sling, braces) if indicated.    Arrange for a responsible person to drive you to and from the hospital on the day of your surgery. Please confirm the visitor policy for the day of your procedure when you receive your phone call with an arrival time.     Call the surgeon's office with any new illnesses, exposures, or additional questions prior to surgery.    Please reference your “My Surgical Experience Booklet” for additional information to prepare for your upcoming surgery.

## 2024-03-07 ENCOUNTER — PATIENT OUTREACH (OUTPATIENT)
Dept: HEMATOLOGY ONCOLOGY | Facility: CLINIC | Age: 79
End: 2024-03-07

## 2024-03-08 ENCOUNTER — DOCUMENTATION (OUTPATIENT)
Dept: HEMATOLOGY ONCOLOGY | Facility: CLINIC | Age: 79
End: 2024-03-08

## 2024-03-08 NOTE — PROGRESS NOTES
I received a call from G[employer] Ashley to assist with moving patient's medical oncology consult that was scheduled on 3- to 3- with Dr. Garcia for after her NavBronch scheduled on 3-.

## 2024-03-12 ENCOUNTER — ANESTHESIA EVENT (OUTPATIENT)
Dept: PERIOP | Facility: HOSPITAL | Age: 79
End: 2024-03-12
Payer: COMMERCIAL

## 2024-03-12 ENCOUNTER — HOSPITAL ENCOUNTER (OUTPATIENT)
Facility: HOSPITAL | Age: 79
Setting detail: OUTPATIENT SURGERY
Discharge: HOME | End: 2024-03-12
Attending: THORACIC SURGERY (CARDIOTHORACIC VASCULAR SURGERY) | Admitting: THORACIC SURGERY (CARDIOTHORACIC VASCULAR SURGERY)
Payer: COMMERCIAL

## 2024-03-12 ENCOUNTER — ANESTHESIA (OUTPATIENT)
Dept: PERIOP | Facility: HOSPITAL | Age: 79
End: 2024-03-12
Payer: COMMERCIAL

## 2024-03-12 ENCOUNTER — HOSPITAL ENCOUNTER (OUTPATIENT)
Dept: RADIOLOGY | Facility: HOSPITAL | Age: 79
Setting detail: OUTPATIENT SURGERY
Discharge: HOME | End: 2024-03-12
Payer: COMMERCIAL

## 2024-03-12 ENCOUNTER — APPOINTMENT (OUTPATIENT)
Dept: RADIOLOGY | Facility: HOSPITAL | Age: 79
End: 2024-03-12
Payer: COMMERCIAL

## 2024-03-12 VITALS
SYSTOLIC BLOOD PRESSURE: 127 MMHG | HEIGHT: 62 IN | BODY MASS INDEX: 23.19 KG/M2 | OXYGEN SATURATION: 95 % | RESPIRATION RATE: 18 BRPM | HEART RATE: 81 BPM | TEMPERATURE: 98.8 F | DIASTOLIC BLOOD PRESSURE: 56 MMHG | WEIGHT: 126 LBS

## 2024-03-12 DIAGNOSIS — R91.8 MASS OF UPPER LOBE OF RIGHT LUNG: ICD-10-CM

## 2024-03-12 PROCEDURE — 88305 TISSUE EXAM BY PATHOLOGIST: CPT | Performed by: PATHOLOGY

## 2024-03-12 PROCEDURE — 88172 CYTP DX EVAL FNA 1ST EA SITE: CPT | Performed by: PATHOLOGY

## 2024-03-12 PROCEDURE — 71045 X-RAY EXAM CHEST 1 VIEW: CPT

## 2024-03-12 PROCEDURE — 88173 CYTOPATH EVAL FNA REPORT: CPT | Performed by: PATHOLOGY

## 2024-03-12 PROCEDURE — 31654 BRONCH EBUS IVNTJ PERPH LES: CPT | Performed by: THORACIC SURGERY (CARDIOTHORACIC VASCULAR SURGERY)

## 2024-03-12 PROCEDURE — 87102 FUNGUS ISOLATION CULTURE: CPT | Performed by: THORACIC SURGERY (CARDIOTHORACIC VASCULAR SURGERY)

## 2024-03-12 PROCEDURE — 87205 SMEAR GRAM STAIN: CPT | Performed by: THORACIC SURGERY (CARDIOTHORACIC VASCULAR SURGERY)

## 2024-03-12 PROCEDURE — 88333 PATH CONSLTJ SURG CYTO XM 1: CPT | Performed by: PATHOLOGY

## 2024-03-12 PROCEDURE — 87070 CULTURE OTHR SPECIMN AEROBIC: CPT | Performed by: THORACIC SURGERY (CARDIOTHORACIC VASCULAR SURGERY)

## 2024-03-12 PROCEDURE — 87206 SMEAR FLUORESCENT/ACID STAI: CPT | Performed by: THORACIC SURGERY (CARDIOTHORACIC VASCULAR SURGERY)

## 2024-03-12 PROCEDURE — 31627 NAVIGATIONAL BRONCHOSCOPY: CPT | Performed by: THORACIC SURGERY (CARDIOTHORACIC VASCULAR SURGERY)

## 2024-03-12 PROCEDURE — 31628 BRONCHOSCOPY/LUNG BX EACH: CPT | Performed by: THORACIC SURGERY (CARDIOTHORACIC VASCULAR SURGERY)

## 2024-03-12 PROCEDURE — 31623 DX BRONCHOSCOPE/BRUSH: CPT | Performed by: THORACIC SURGERY (CARDIOTHORACIC VASCULAR SURGERY)

## 2024-03-12 PROCEDURE — 88112 CYTOPATH CELL ENHANCE TECH: CPT | Performed by: PATHOLOGY

## 2024-03-12 PROCEDURE — 87116 MYCOBACTERIA CULTURE: CPT | Performed by: THORACIC SURGERY (CARDIOTHORACIC VASCULAR SURGERY)

## 2024-03-12 PROCEDURE — 31629 BRONCHOSCOPY/NEEDLE BX EACH: CPT | Performed by: THORACIC SURGERY (CARDIOTHORACIC VASCULAR SURGERY)

## 2024-03-12 PROCEDURE — 31624 DX BRONCHOSCOPE/LAVAGE: CPT | Performed by: THORACIC SURGERY (CARDIOTHORACIC VASCULAR SURGERY)

## 2024-03-12 RX ORDER — PROPOFOL 10 MG/ML
INJECTION, EMULSION INTRAVENOUS CONTINUOUS PRN
Status: DISCONTINUED | OUTPATIENT
Start: 2024-03-12 | End: 2024-03-12

## 2024-03-12 RX ORDER — PROPOFOL 10 MG/ML
INJECTION, EMULSION INTRAVENOUS AS NEEDED
Status: DISCONTINUED | OUTPATIENT
Start: 2024-03-12 | End: 2024-03-12

## 2024-03-12 RX ORDER — LIDOCAINE HYDROCHLORIDE 10 MG/ML
INJECTION, SOLUTION EPIDURAL; INFILTRATION; INTRACAUDAL; PERINEURAL AS NEEDED
Status: DISCONTINUED | OUTPATIENT
Start: 2024-03-12 | End: 2024-03-12

## 2024-03-12 RX ORDER — ESMOLOL HYDROCHLORIDE 10 MG/ML
INJECTION INTRAVENOUS AS NEEDED
Status: DISCONTINUED | OUTPATIENT
Start: 2024-03-12 | End: 2024-03-12

## 2024-03-12 RX ORDER — ALBUTEROL SULFATE 2.5 MG/3ML
2.5 SOLUTION RESPIRATORY (INHALATION) ONCE
Status: COMPLETED | OUTPATIENT
Start: 2024-03-12 | End: 2024-03-12

## 2024-03-12 RX ORDER — ROCURONIUM BROMIDE 10 MG/ML
INJECTION, SOLUTION INTRAVENOUS AS NEEDED
Status: DISCONTINUED | OUTPATIENT
Start: 2024-03-12 | End: 2024-03-12

## 2024-03-12 RX ORDER — METOPROLOL TARTRATE 1 MG/ML
INJECTION, SOLUTION INTRAVENOUS AS NEEDED
Status: DISCONTINUED | OUTPATIENT
Start: 2024-03-12 | End: 2024-03-12

## 2024-03-12 RX ORDER — FENTANYL CITRATE/PF 50 MCG/ML
25 SYRINGE (ML) INJECTION
Status: DISCONTINUED | OUTPATIENT
Start: 2024-03-12 | End: 2024-03-12 | Stop reason: HOSPADM

## 2024-03-12 RX ORDER — ONDANSETRON 2 MG/ML
4 INJECTION INTRAMUSCULAR; INTRAVENOUS ONCE AS NEEDED
Status: DISCONTINUED | OUTPATIENT
Start: 2024-03-12 | End: 2024-03-12 | Stop reason: HOSPADM

## 2024-03-12 RX ORDER — FENTANYL CITRATE 50 UG/ML
INJECTION, SOLUTION INTRAMUSCULAR; INTRAVENOUS AS NEEDED
Status: DISCONTINUED | OUTPATIENT
Start: 2024-03-12 | End: 2024-03-12

## 2024-03-12 RX ORDER — DEXAMETHASONE SODIUM PHOSPHATE 10 MG/ML
INJECTION, SOLUTION INTRAMUSCULAR; INTRAVENOUS AS NEEDED
Status: DISCONTINUED | OUTPATIENT
Start: 2024-03-12 | End: 2024-03-12

## 2024-03-12 RX ORDER — SODIUM CHLORIDE, SODIUM LACTATE, POTASSIUM CHLORIDE, CALCIUM CHLORIDE 600; 310; 30; 20 MG/100ML; MG/100ML; MG/100ML; MG/100ML
125 INJECTION, SOLUTION INTRAVENOUS CONTINUOUS
Status: DISCONTINUED | OUTPATIENT
Start: 2024-03-12 | End: 2024-03-12 | Stop reason: HOSPADM

## 2024-03-12 RX ORDER — ONDANSETRON 2 MG/ML
INJECTION INTRAMUSCULAR; INTRAVENOUS AS NEEDED
Status: DISCONTINUED | OUTPATIENT
Start: 2024-03-12 | End: 2024-03-12

## 2024-03-12 RX ORDER — SODIUM CHLORIDE, SODIUM LACTATE, POTASSIUM CHLORIDE, CALCIUM CHLORIDE 600; 310; 30; 20 MG/100ML; MG/100ML; MG/100ML; MG/100ML
INJECTION, SOLUTION INTRAVENOUS CONTINUOUS PRN
Status: DISCONTINUED | OUTPATIENT
Start: 2024-03-12 | End: 2024-03-12

## 2024-03-12 RX ADMIN — METOROPROLOL TARTRATE 1 MG: 5 INJECTION, SOLUTION INTRAVENOUS at 15:28

## 2024-03-12 RX ADMIN — LIDOCAINE HYDROCHLORIDE 50 MG: 10 INJECTION, SOLUTION EPIDURAL; INFILTRATION; INTRACAUDAL; PERINEURAL at 14:16

## 2024-03-12 RX ADMIN — ROCURONIUM BROMIDE 10 MG: 10 INJECTION, SOLUTION INTRAVENOUS at 15:06

## 2024-03-12 RX ADMIN — ESMOLOL HYDROCHLORIDE 20 MG: 100 INJECTION, SOLUTION INTRAVENOUS at 14:28

## 2024-03-12 RX ADMIN — PROPOFOL 150 MG: 10 INJECTION, EMULSION INTRAVENOUS at 14:16

## 2024-03-12 RX ADMIN — FENTANYL CITRATE 50 MCG: 50 INJECTION INTRAMUSCULAR; INTRAVENOUS at 15:34

## 2024-03-12 RX ADMIN — FENTANYL CITRATE 50 MCG: 50 INJECTION INTRAMUSCULAR; INTRAVENOUS at 14:16

## 2024-03-12 RX ADMIN — ROCURONIUM BROMIDE 30 MG: 10 INJECTION, SOLUTION INTRAVENOUS at 14:16

## 2024-03-12 RX ADMIN — METOROPROLOL TARTRATE 1 MG: 5 INJECTION, SOLUTION INTRAVENOUS at 15:37

## 2024-03-12 RX ADMIN — PROPOFOL 100 MCG/KG/MIN: 10 INJECTION, EMULSION INTRAVENOUS at 14:18

## 2024-03-12 RX ADMIN — ESMOLOL HYDROCHLORIDE 10 MG: 100 INJECTION, SOLUTION INTRAVENOUS at 14:50

## 2024-03-12 RX ADMIN — FENTANYL CITRATE 50 MCG: 50 INJECTION INTRAMUSCULAR; INTRAVENOUS at 14:25

## 2024-03-12 RX ADMIN — ALBUTEROL SULFATE 2.5 MG: 2.5 SOLUTION RESPIRATORY (INHALATION) at 13:38

## 2024-03-12 RX ADMIN — DEXAMETHASONE SODIUM PHOSPHATE 10 MG: 10 INJECTION, SOLUTION INTRAMUSCULAR; INTRAVENOUS at 14:16

## 2024-03-12 RX ADMIN — ONDANSETRON 4 MG: 2 INJECTION INTRAMUSCULAR; INTRAVENOUS at 15:35

## 2024-03-12 RX ADMIN — SODIUM CHLORIDE, SODIUM LACTATE, POTASSIUM CHLORIDE, AND CALCIUM CHLORIDE: .6; .31; .03; .02 INJECTION, SOLUTION INTRAVENOUS at 14:10

## 2024-03-12 RX ADMIN — SUGAMMADEX 200 MG: 100 INJECTION, SOLUTION INTRAVENOUS at 15:44

## 2024-03-12 RX ADMIN — PROPOFOL 30 MG: 10 INJECTION, EMULSION INTRAVENOUS at 14:20

## 2024-03-12 RX ADMIN — METOROPROLOL TARTRATE 1 MG: 5 INJECTION, SOLUTION INTRAVENOUS at 14:56

## 2024-03-12 RX ADMIN — ESMOLOL HYDROCHLORIDE 10 MG: 100 INJECTION, SOLUTION INTRAVENOUS at 14:45

## 2024-03-12 RX ADMIN — ROCURONIUM BROMIDE 10 MG: 10 INJECTION, SOLUTION INTRAVENOUS at 14:39

## 2024-03-12 RX ADMIN — SODIUM CHLORIDE, SODIUM LACTATE, POTASSIUM CHLORIDE, AND CALCIUM CHLORIDE 125 ML/HR: .6; .31; .03; .02 INJECTION, SOLUTION INTRAVENOUS at 13:25

## 2024-03-12 NOTE — OP NOTE
OPERATIVE REPORT  PATIENT NAME: Freda Canas    :  1945  MRN: [id number]  Pt Location: [facility] OR ROOM 08    SURGERY DATE: 3/12/2024    Surgeons and Role:     * Brayan Ontiveros MD - Primary     * Faye Han DO - Observing    Preop Diagnosis:  Mass of upper lobe of right lung [R91.8]    Post-Op Diagnosis Codes:     * Mass of upper lobe of right lung [R91.8]    Procedure(s):  1. Navigational bronchoscopy with transbronchial biopsy of a right upper lobe mass, transbronchial brushing of a right upper lobe mass, and transbronchial forceps biopsy of a right upper lobe mass.  2. Endobronchial ultrasound but no biopsy   3. Radial EBUS to verify mass location   4. BAL    Specimen(s):  ID Type Source Tests Collected by Time Destination   1 :  Brushing Lung, Right Upper Lobe Bronchial Brushing NON-GYNECOLOGIC CYTOLOGY Brayan Ontiveros MD 3/12/2024 1440    2 : RUL Mass FNA Lung, Right Upper Lobe FINE NEEDLE ASPIRATION/BIOPSY Brayan Ontiveros MD 3/12/2024 1458    3 : RUL Mass Tissue Lung, Right Upper Lobe TISSUE EXAM Brayan Ontiveros MD 3/12/2024 1517    4 : RUL BAL Lavage Lung, Right Upper Lobe Bronchoalveolar Lavage NON-GYNECOLOGIC CYTOLOGY Brayan Ontiveros MD 3/12/2024 1530    A : RUL BAL Bronchial Lung, Right Upper Lobe Bronchial Brushing FUNGAL CULTURE, BRONCHIAL CULTURE AND GRAM STAIN, CULTURE, TISSUE AND GRAM STAIN, AFB CULTURE WITH STAIN Brayan Ontiveros MD 3/12/2024 1531        Estimated Blood Loss:   Minimal    Drains:  * No LDAs found *    Anesthesia Type:   General    Operative Indications:  Mass of upper lobe of right lung [R91.8]  78-year-old with a 3 cm right upper lobe consolidation and paratracheal adenopathy somewhat suspicious for malignancy    Operative Findings:  Able to find the solid right upper lobe mass.  Able to see on x-ray.  Concentric view on radial EBUS.  Performed 17 separate biopsies.  No definite malignancy so far.  Awaiting final pathology    Complications:    None    Procedure and Technique:  After obtaining informed consent from the patient, they were transported to the operating room and placed supine on the OR table.  General anesthesia was administered without issue and an endotracheal tube was placed. A formal time-out was performed at this time including patient, date of birth, intended procedure, antibiotic usage as appropriate, beta-blocker usage as appropriate and plans for specimen handling.    An adult bronchoscope was inserted into the endotracheal tube and a thorough bronchoscopy was then performed examining the trachea, mainstem bronchi, sigmoid segmental and subsegmental bronchi.  All mucus was suctioned out to improve visualization.  There is no suspicion or identified risk for TB or other air born infectious disease.      During our bronchoscopy we positioned the patient's endotracheal tube at least 4 cm above the main tia.  The endotracheal tube was then cut just above the patient's nose and secured in place.  The  Cog[employer]ion endotracheal tube barajas and sheath was then attached to the endotracheal tube and secured to the bed in the appropriate position.  The [employer] field sensor was then positioned on the same side as the target lesion 3 patient pads were placed in the appropriate position.  We verified that these were within our field that included the main trachea.   The patient's bilateral arms were then tucked and we made sure to minimize any metal within our field.    Prior to our procedure the patient had undergone a planning  CT of the chest, navigational protocol with thin slices.   We had previously uploaded this to a [employer] computer where our navigational pathway/ plan was determined.  This was uploaded to our system prior to initiation of this procedure. We then started the robotic navigational bronchoscopy portion of our procedure.   The robotic bronchoscope was inserted through the sheath barajas and into the endotracheal tube.  We then  went through our initialization/registration process.   Once synched we began to navigate our to our target lesion.   There was good correlation between robotic mapping and bronchoscopic imaging.  With the assistance of the fused imaging and navigation plan we navigated out to our target mass.    Our target lesion was in the right upper lobe.  Once we identified our target lesion via navigation this  was verified with radial EBUS.  Our radial EBUS image was a concentric view.  Then using fluoroscopy we obtained a baseline image.  The target lesion was able to be seen with fluoroscopy.  Then using continuous fluoroscopy we began to take biopsies the target lesion.      We started withtransbronchial brushing of the target lesion using the [employer] brush.  A touch prep was performed on the brush and then the remainder was cut in CytoLyt.  In total 8  trans bronchial brushings were performed at our target.    Then, multiple transbronchial biopsies were taken using the Olympus 21 gauge PeriView needle.   Transbronchial needle biopsies were used to create slides that were evaluated via IMANI with our pathologist in the room. In total 3  transbronchial needle biopsies were taken at our target lesion.    Finally, we then performed transbronchial forceps biopsies of the target lesion using the [employer] biopsy forceps.  We performed a touch prep analysis  of this specimen and put the rest in formalin.  In total 6  transbronchial forceps biopsies were performed at our target.    Our specimens were immediately analyzed via IMANI pathology in the room showing fungal cells but no evidence of malignancy.      The remainder of the specimens were sent in cell block.  We then performed a BAL.  Using 20 mL of sterile saline we flushed this down the robotic bronchoscope.  The scope was carefully drawn back with suction to extract our BAL specimen.  This was sent for culture and cytology.  Once we confirmed hemostasis the robotic  bronchoscope was safely withdrawn through the airway and our robotic navigational portion of the procedure was completed at this time.    The Olympus EBUS scope was then inserted and used to identify mediastinal lymph node stations. Mediastinal stations 7, 10R, 4R, 4L, and 11L were visualized using ultrasound guidance.  I could not visualize any lymph nodes greater than 5 mm. therefore no biopsy was performed..    There was no significant bleeding seen from the airway. This portion of the procedure was completed at this time.   An adult bronchoscope was again reintroduced into the airway and the airway was fully suctioned out.  Once hemostasis was verified the patient awoke and was extubated without issue.  They were transferred to the PACU in stable condition.      All specimen, needle, and instrument counts were verified at the conclusion of the procedure and were correct.  We ordered a chest x-ray to evaluate for a pneumothorax in the PACU.      I was present for the entire procedure.    Patient Disposition:  PACU         SIGNATURE: Brayan Ontiveros MD  DATE: March 12, 2024  TIME: 3:52 PM

## 2024-03-12 NOTE — INTERVAL H&P NOTE
H&P reviewed. After examining the patient I find no changes in the patients condition since the H&P had been written.    Vitals:    03/12/24 1319   BP: 147/67   Pulse: 64   Resp: 16   Temp: 98.6 °F (37 °C)   SpO2: 99%

## 2024-03-12 NOTE — ANESTHESIA POSTPROCEDURE EVALUATION
Post-Op Assessment Note    CV Status:  Stable    Pain management: adequate       Mental Status:  Alert and lethargic   Hydration Status:  Stable   PONV Controlled:  None   Airway Patency:  Patent     Post Op Vitals Reviewed: Yes    No anethesia notable event occurred.    Staff: CRNA               /54 (03/12/24 1610)    Temp 98.7 °F (37.1 °C) (03/12/24 1610)    Pulse 84 (03/12/24 1610)   Resp 20 (03/12/24 1610)    SpO2   99% on RA

## 2024-03-12 NOTE — ANESTHESIA PREPROCEDURE EVALUATION
Procedure:  NAVIGATIONAL BRONCHOSCOPY ([facility])  ENDOBRONCHIAL ULTRASOUND (EBUS) with biopsy ([facility])  FLEXIBLE BRONCHOSCOPY ([facility])    Relevant Problems   CARDIO   (+) Essential hypertension   (+) Hyperlipidemia   (+) Other chest pain      /RENAL   (+) Stage 3a chronic kidney disease (HCC)      NEURO/PSYCH   (+) Anxiety disorder   (+) Major depressive disorder with single episode, in remission (HCC)             Anesthesia Plan  ASA Score- 3     Anesthesia Type- general with ASA Monitors.         Additional Monitors:     Airway Plan: ETT.           Plan Factors-    Chart reviewed.                      Induction- intravenous.    Postoperative Plan-     Informed Consent- Anesthetic plan and risks discussed with patient.  I personally reviewed this patient with the CRNA. Discussed and agreed on the Anesthesia Plan with the CRNA..

## 2024-03-13 LAB — RHODAMINE-AURAMINE STN SPEC: NORMAL

## 2024-03-14 LAB
BACTERIA BRONCH AEROBE CULT: NO GROWTH
GRAM STN SPEC: NORMAL
GRAM STN SPEC: NORMAL

## 2024-03-15 PROCEDURE — 88112 CYTOPATH CELL ENHANCE TECH: CPT | Performed by: PATHOLOGY

## 2024-03-15 PROCEDURE — 88305 TISSUE EXAM BY PATHOLOGIST: CPT | Performed by: PATHOLOGY

## 2024-03-15 PROCEDURE — 88173 CYTOPATH EVAL FNA REPORT: CPT | Performed by: PATHOLOGY

## 2024-03-15 PROCEDURE — 88172 CYTP DX EVAL FNA 1ST EA SITE: CPT | Performed by: PATHOLOGY

## 2024-03-18 ENCOUNTER — HOSPITAL ENCOUNTER (OUTPATIENT)
Dept: PULMONOLOGY | Facility: HOSPITAL | Age: 79
Discharge: HOME | End: 2024-03-18
Payer: COMMERCIAL

## 2024-03-18 ENCOUNTER — HOSPITAL ENCOUNTER (OUTPATIENT)
Dept: RADIOLOGY | Age: 79
Discharge: HOME | End: 2024-03-18
Payer: COMMERCIAL

## 2024-03-18 DIAGNOSIS — R91.8 MASS OF UPPER LOBE OF RIGHT LUNG: ICD-10-CM

## 2024-03-18 DIAGNOSIS — D38.1 NEOPLASM OF UNCERTAIN BEHAVIOR OF BRONCHUS OF RIGHT UPPER LOBE: ICD-10-CM

## 2024-03-18 LAB
FUNGUS SPEC CULT: NORMAL
GLUCOSE SERPL-MCNC: 95 MG/DL (ref 65–140)

## 2024-03-18 PROCEDURE — A9552 F18 FDG: HCPCS

## 2024-03-18 PROCEDURE — 94729 DIFFUSING CAPACITY: CPT

## 2024-03-18 PROCEDURE — 94729 DIFFUSING CAPACITY: CPT | Performed by: INTERNAL MEDICINE

## 2024-03-18 PROCEDURE — 94760 N-INVAS EAR/PLS OXIMETRY 1: CPT

## 2024-03-18 PROCEDURE — 78815 PET IMAGE W/CT SKULL-THIGH: CPT

## 2024-03-18 PROCEDURE — 94060 EVALUATION OF WHEEZING: CPT | Performed by: INTERNAL MEDICINE

## 2024-03-18 PROCEDURE — 94726 PLETHYSMOGRAPHY LUNG VOLUMES: CPT | Performed by: INTERNAL MEDICINE

## 2024-03-18 PROCEDURE — 94726 PLETHYSMOGRAPHY LUNG VOLUMES: CPT

## 2024-03-18 PROCEDURE — 82948 REAGENT STRIP/BLOOD GLUCOSE: CPT

## 2024-03-18 PROCEDURE — 94060 EVALUATION OF WHEEZING: CPT

## 2024-03-18 RX ORDER — ALBUTEROL SULFATE 2.5 MG/3ML
2.5 SOLUTION RESPIRATORY (INHALATION) ONCE
Status: COMPLETED | OUTPATIENT
Start: 2024-03-18 | End: 2024-03-18

## 2024-03-18 RX ADMIN — ALBUTEROL SULFATE 2.5 MG: 2.5 SOLUTION RESPIRATORY (INHALATION) at 07:39

## 2024-03-18 NOTE — LETTER
[facility]  [address] [address]      March 21, 2024    MRN: [id number]     Phone: [phone]     Dear Ms. Canas,    You recently had a(n) Nuclear Medicine performed on 3/18/2024 at  [facility] that was requested by Rhiannon Londono PA-C. The study was reviewed by a radiologist, which is a physician who specializes in medical imaging. The radiologist issued a report describing his or her findings. In that report there was a finding that the radiologist felt warranted further discussion with your health care provider and that discussion would be beneficial to you.      The results were sent to Rhiannon Londono PA-C on 03/18/2024  3:25 PM. We recommend that you contact Rhiannon Londono PA-C at [phone] or set up an appointment to discuss the results of the imaging test. If you have already heard from Rhiannon Londono PA-C regarding the results of your study, you can disregard this letter.     This letter is not meant to alarm you, but intended to encourage you to follow-up on your results with the provider that sent you for the imaging study. In addition, we have enclosed answers to frequently asked questions by other patients who have also received a letter to review results with their health care provider (see page two).      Thank you for choosing [facility] for your medical imaging needs.                                                                                                                                                        FREQUENTLY ASKED QUESTIONS    Why am I receiving this letter?  [place] State Law requires us to notify patients who have findings on imaging exams that may require more testing or follow-up with a health professional within the next 3 months.        How serious is the finding on the imaging test?  This letter is sent to all patients who may need follow-up or more testing  within the next 3 months.  Receiving this letter does not necessarily mean you have a life-threatening imaging finding or disease.  Recommendations in the radiologist’s imaging report are general in nature and it is up to your healthcare provider to say whether those recommendations make sense for your situation.  You are strongly encouraged to talk to your health care provider about the results and ask whether additional steps need to be taken.    Where can I get a copy of the final report for my recent radiology exam?  To get a full copy of the report you can access your records online at [email or web address] or please contact [facility] Medical Records Department at [phone] Monday through Friday between 8 am and 6 pm.         What do I need to do now?           Please contact your health care provider who requested the imaging study to discuss what further actions (if any) are needed.  You may have already heard from (your ordering provider) in regard to this test in which case you can disregard this letter.        NOTICE IN ACCORDANCE WITH THE [place] STATE “PATIENT TEST RESULT INFORMATION ACT OF 2018”    You are receiving this notice as a result of a determination by your diagnostic imaging service that further discussions of your test results are warranted and would be beneficial to you.    The complete results of your test or tests have been or will be sent to the health care practitioner that ordered the test or tests. It is recommended that you contact your health care practitioner to discuss your results as soon as possible.

## 2024-03-19 ENCOUNTER — TELEPHONE (OUTPATIENT)
Dept: HEMATOLOGY ONCOLOGY | Facility: CLINIC | Age: 79
End: 2024-03-19

## 2024-03-19 ENCOUNTER — TELEPHONE (OUTPATIENT)
Dept: CARDIAC SURGERY | Facility: CLINIC | Age: 79
End: 2024-03-19

## 2024-03-19 DIAGNOSIS — R91.8 MASS OF UPPER LOBE OF RIGHT LUNG: Primary | ICD-10-CM

## 2024-03-19 LAB
MYCOBACTERIUM SPEC CULT: NORMAL
RHODAMINE-AURAMINE STN SPEC: NORMAL

## 2024-03-19 PROCEDURE — 88333 PATH CONSLTJ SURG CYTO XM 1: CPT | Performed by: PATHOLOGY

## 2024-03-19 PROCEDURE — 88305 TISSUE EXAM BY PATHOLOGIST: CPT | Performed by: PATHOLOGY

## 2024-03-19 NOTE — TELEPHONE ENCOUNTER
I called and spoke with Freda's .  She had just stepped out.  I reviewed biopsy results with them.  There was no evidence of malignancy on any of the 17 biopsies.  All culture data was negative.  I reviewed her PET scan from 3/18.  This showed no significant uptake.  From my standpoint I would recommend following with a repeat noncontrast CT in 3 months.  I will order this now and then schedule follow-up with her after.  He voiced understanding and appreciation.  I will reach out to my office to have them schedule the scan as well as schedule her follow-up.  I will review her case with the oncology team to see if it is necessary to see her in the office.    Baryan Ontiveros

## 2024-03-19 NOTE — TELEPHONE ENCOUNTER
Patient Call    Who are you speaking with? Patient    If it is not the patient, are they listed on an active communication consent form? Yes   What is the reason for this call? Is an appmt with Dr Garcia at hem/onc still needed?   Does this require a call back? Yes   If a call back is required, please list [facility] call back number [phone]    If a call back is required, advise that a message will be forwarded to their care team and someone will return their call as soon as possible.   Did you relay this information to the patient? Yes

## 2024-03-20 ENCOUNTER — TELEPHONE (OUTPATIENT)
Dept: GASTROENTEROLOGY | Facility: CLINIC | Age: 79
End: 2024-03-20

## 2024-03-20 ENCOUNTER — OFFICE VISIT (OUTPATIENT)
Dept: GASTROENTEROLOGY | Facility: CLINIC | Age: 79
End: 2024-03-20
Payer: COMMERCIAL

## 2024-03-20 ENCOUNTER — PREP FOR PROCEDURE (OUTPATIENT)
Dept: GASTROENTEROLOGY | Facility: CLINIC | Age: 79
End: 2024-03-20

## 2024-03-20 VITALS
HEIGHT: 62 IN | HEART RATE: 67 BPM | SYSTOLIC BLOOD PRESSURE: 122 MMHG | DIASTOLIC BLOOD PRESSURE: 74 MMHG | WEIGHT: 125.4 LBS | BODY MASS INDEX: 23.08 KG/M2

## 2024-03-20 DIAGNOSIS — K57.90 DIVERTICULOSIS: ICD-10-CM

## 2024-03-20 DIAGNOSIS — K64.8 INTERNAL HEMORRHOIDS: ICD-10-CM

## 2024-03-20 DIAGNOSIS — Z86.010 HISTORY OF COLON POLYPS: Primary | ICD-10-CM

## 2024-03-20 PROCEDURE — 99203 OFFICE O/P NEW LOW 30 MIN: CPT | Performed by: NURSE PRACTITIONER

## 2024-03-20 NOTE — PROGRESS NOTES
[facility] [place] - Outpatient Consultation  Freda Canas 78 y.o. female MRN: [id number]  Encounter: [id number]          ASSESSMENT AND PLAN:      1. History of colon polyps  History of polyps, none seen on last colonoscopy in 2018, now due for surveillance. Denies any change in bowel habits, rectal bleeding, abdominal pain, or unintended weight loss. Recently undergoing evaluation of RUL mass. Prep and procedure explained. Will use OTC prep per patient preference. Risk/benefit discussed.  -     Colonoscopy; Future; Expected date: 03/20/2024    2. Diverticulosis  3. Internal hemorrhoids  Continue high fiber diet      ______________________________________________________________________    HPI:  Freda Canas is a 78 y.o. female  with past medical history hypothyroidism, hypertension, PAD, hyperlipidemia, MDD, and recent finding of RUL mass s/p EBUS presenting for consultation to colonoscopy. Deneis any HB, dysphagia/odynophagia, N/V, abdominal pain, changes in bowel habit, black/bloody stool, changes in appetite or weight. Eats prunes daily to help move her bowels. Denies any known family history of colon cancer. Last colonoscopy was in 2018 due to history of polyps but none were seen at that time. She also had diverticulosis and hemorrhoids.      REVIEW OF SYSTEMS:    CONSTITUTIONAL: Denies any fever, chills, rigors, and weight loss.  HEENT: No earache or tinnitus.  CARDIOVASCULAR: No chest pain or palpitations.   RESPIRATORY: Denies any cough, hemoptysis, shortness of breath or dyspnea on exertion.  GASTROINTESTINAL: As noted in the History of Present Illness.   GENITOURINARY: Denies any hematuria or dysuria.  NEUROLOGIC: No dizziness or vertigo.   MUSCULOSKELETAL: Denies any joint swellings.  SKIN: Denies skin rashes or itching.   ENDOCRINE: Denies excessive thirst. Denies intolerance to heat or cold.  PSYCHOSOCIAL: Denies depression or anxiety. Denies any recent memory loss.        Historical Information   Past Medical History:   Diagnosis Date    Anxiety     Colon polyps     Essential hypertension 08/08/2018    Gallbladder polyp     Hyperlipidemia     Hypertension     Kidney cysts     Kidney stones     Lung disease      Past Surgical History:   Procedure Laterality Date    BRONCHOSCOPY N/A 03/12/2024    Procedure: NAVIGATIONAL BRONCHOSCOPY;  Surgeon: Brayan Ontiveros MD;  Location: [facility];  Service: Thoracic    COLONOSCOPY      COLONOSCOPY W/ POLYPECTOMY      ENDOBRONCHIAL ULTRASOUND (EBUS) N/A 03/12/2024    Procedure: ENDOBRONCHIAL ULTRASOUND (EBUS) with biopsy;  Surgeon: Brayan Ontiveros MD;  Location: [facility];  Service: Thoracic    HEMORRHOID SURGERY  1971    [place] [facility] INCL FLUOR GDNCE DX W/CELL WASHG SPX N/A 03/12/2024    Procedure: FLEXIBLE BRONCHOSCOPY;  Surgeon: Brayan Ontiveros MD;  Location: [facility];  Service: Thoracic    [place] EDG US EXAM SURGICAL ALTER STOM DUODENUM/JEJUNUM N/A 03/14/2019    Procedure: LINEAR ENDOSCOPIC U/S;  Surgeon: Roscoe Malagon MD;  Location: [facility];  Service: Gastroenterology     Social History   Social History     Substance and Sexual Activity   Alcohol Use Yes    Comment: social - rare      Social History     Substance and Sexual Activity   Drug Use No     Social History     Tobacco Use   Smoking Status Never    Passive exposure: Never   Smokeless Tobacco Never     Family History   Problem Relation Age of Onset    Hyperlipidemia Mother     Heart attack Father     Other Sister         open heart surgery     Heart murmur Sister     Stroke Sister     Hyperlipidemia Sister         all siblings     Breast cancer Sister 70    Parkinsonism Sister     Cancer Sister     Heart attack Brother         open heart surgery     Deep vein thrombosis Brother     Hyperlipidemia Brother     Coronary artery disease Brother         stents placed     Heart attack Paternal Aunt     Heart attack Paternal Uncle     Hypertension Family         all in the family  "both sides    No Known Problems Daughter     No Known Problems Maternal Grandmother     No Known Problems Maternal Grandfather     No Known Problems Paternal Grandmother     No Known Problems Paternal Grandfather     No Known Problems Sister     No Known Problems Brother     No Known Problems Son     No Known Problems Son     Anuerysm Neg Hx     Heart failure Neg Hx        Meds/Allergies       Current Outpatient Medications:     amLODIPine (NORVASC) 5 mg tablet    benazepril (LOTENSIN) 40 MG tablet    Biotin 5 MG TABS    calcium-vitamin D 250-100 MG-UNIT per tablet    ciclopirox (LOPROX) 0.77 % cream    Coenzyme Q10 (COQ-10) 100 MG CAPS    escitalopram (LEXAPRO) 10 mg tablet    ezetimibe (ZETIA) 10 mg tablet    Magnesium Oxide -Mg Supplement 400 MG CAPS    metoprolol succinate (TOPROL-XL) 25 mg 24 hr tablet    Omega-3 Fatty Acids (Omega-3 Fish Oil) 500 MG CAPS    Plant Sterols and Stanols (CHOLESTOFF PO)    Red Yeast Rice 600 MG CAPS    alendronate (Fosamax) 70 mg tablet    lidocaine (Lidoderm) 5 %    naproxen (Naprosyn) 500 mg tablet    Current Facility-Administered Medications:     cyanocobalamin injection 1,000 mcg, 1,000 mcg, Intramuscular, Q30 Days, 1,000 mcg at 06/12/23 1312    cyanocobalamin injection 1,000 mcg, 1,000 mcg, Intramuscular, Q30 Days, 1,000 mcg at 10/19/23 0928    Allergies   Allergen Reactions    Imdur [Isosorbide Nitrate] Headache    Sulfa Antibiotics Hives    Ciprofloxacin Rash and GI Intolerance    Macrobid [Nitrofurantoin] Other (See Comments)     Pt does not recall reaction           Objective     Blood pressure 122/74, pulse 67, height 5' 2\" (1.575 m), weight 56.9 kg (125 lb 6.4 oz), not currently breastfeeding. Body mass index is 22.94 kg/m².        PHYSICAL EXAM:      General Appearance:   Alert, cooperative, no distress   HEENT:   Normocephalic, atraumatic, anicteric.     Neck:  Supple, symmetrical, trachea midline   Lungs:   Clear to auscultation bilaterally; no rales, rhonchi or " wheezing; respirations unlabored    Heart::   Regular rate and rhythm; no murmur.   Abdomen:   Soft, non-tender, non-distended; normal bowel sounds; no masses, no organomegaly    Genitalia:   Deferred    Rectal:   Deferred    Extremities:  No cyanosis, clubbing or edema    Skin:  No jaundice, rashes, or lesions    Lymph nodes:  No palpable cervical lymphadenopathy        Lab Results:   No visits with results within 1 Day(s) from this visit.   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 CT renal protocol dated 1/21/2019. CELL TYPE: Not applicable TECHNIQUE:   8.6 mCi F-18-FDG administered IV. Multiplanar attenuation corrected and non attenuation corrected PET images are available for interpretation, and contiguous, low dose, axial CT sections were obtained from the skull base through the femurs. Intravenous contrast material was not utilized. This examination, like all CT scans performed in the [facility], was performed utilizing techniques to minimize radiation dose exposure, including the use of iterative reconstruction and automated exposure control. Fasting serum glucose: 95 mg/dl FINDINGS: VISUALIZED BRAIN: No acute abnormalities are seen. HEAD/NECK: There is a physiologic distribution of FDG. No FDG avid cervical adenopathy is seen. CT images: Unremarkable. CHEST: Patient's known right upper lobe masslike opacity is mildly FDG avid and essentially stable in size but appears less dense than prior CT  dated 2/21/2024, currently measuring approximately 4.0 x 2.8 cm on image 82 series 3 with max SUV of 2.7. The interval decrease in density could reflect an inflammatory etiology although underlying hypermetabolic malignancy remains the diagnosis of exclusion. On PET image 90 and poorly characterized on low-dose unenhanced CT is subtle right hilar activity in the expected location of mildly enlarged right hilar lymph node seen on recent contrast-enhanced CT of chest with max SUV of 2.2 in this region which could reflect inflammatory/physiologic activity with early asael metastatic disease not excluded. CT images: Atherosclerotic vascular calcifications including those of the coronary arteries are noted. On image 99 of series 3 there is a subpleural 2 mm left lower lobe lung nodule. Patient's known additional tiny lung nodules were better characterized on prior dedicated CT of chest and are beyond the limits of resolution for PET/CT. ABDOMEN: No FDG avid soft tissue lesions are seen. CT images: Atherosclerotic vascular calcifications are noted. On image 137 of series 3 again noted is an indeterminant 1.9 cm isodense left lower pole renal mass, which was previously characterized to reflect a hyperdense cyst on CT renal protocol dated 1/21/2019. PELVIS: No FDG avid soft tissue lesions are seen. CT images: Unremarkable. OSSEOUS STRUCTURES: No FDG avid lesions are seen. CT images: No significant findings.     Impression: 1. Hypermetabolic right upper lobe masslike opacity is essentially stable in size since prior CT but is decreased in density. While the mild interval decrease in density could reflect an improving inflammatory process, underlying hypermetabolic malignancy remains the diagnosis of exclusion. Further evaluation with tissue sampling is recommended as clinically indicated. If tissue sampling is not performed, continued short interval follow-up with CT of chest in 3 months is recommended to ensure stability.  2. Subtle low-level activity in the right hilar region and expected location of mildly enlarged right hilar lymph nodes seen on recent CT. Findings are nonspecific and could reflect inflammatory activity with underlying asael metastatic disease not excluded. 3. Given possible underlying malignancy, short interval follow-up with CT of chest in 3 months is recommended to ensure stability patient's known 3 mm lung nodules which are better characterized on prior dedicated CT of chest dated 2/21/2024. Please see above for details and additional findings. The study was marked in EPIC for significant notification. The study was marked in Epic for follow-up. Workstation performed: [id number]     XR chest portable    Result Date: 3/13/2024  Narrative: XR CHEST PORTABLE INDICATION: s/p robert bronch. COMPARISON: Left rib series 2/4/2024, CT chest 2/21/2024 FINDINGS: Right upper lobe masslike opacity again evident. The left lung is clear. No pneumothorax or pleural effusion. Normal cardiomediastinal silhouette. Bones are unremarkable for age. Normal upper abdomen.     Impression: No pneumothorax. Persistent right upper lobe masslike opacity. Workstation performed: [id number]     XR chest 1 view    Result Date: 3/13/2024  Narrative: C-ARM - XR CHEST 1 VIEW INDICATION: R91.8: Other nonspecific abnormal finding of lung field. Procedure guidance. TECHNIQUE: Fluoroscopic guidance provided. COMPARISON: Left rib series 2/4/2024 FLUOROSCOPY TIME: 29.50SEC 10 FLUOROSCOPIC IMAGES FINDINGS: Fluoroscopy provided for procedure guidance. Images demonstrate ongoing navigational bronchoscopy and biopsy of known right upper lobe lung lesion. Osseous and soft tissue detail limited by technique.     Impression: Fluoroscopy provided for procedure guidance. Please refer to the separate procedure note for additional details. Workstation performed: [id number]     CT chest w contrast    Result Date: 2/21/2024  Narrative: CT CHEST WITH IV CONTRAST  INDICATION: R93.89: Abnormal findings on diagnostic imaging of other specified body structures. COMPARISON: No prior CT chest. Correlation with radiographs of the left ribs 2/4/2024 TECHNIQUE: CT examination of the chest was performed. Multiplanar 2D reformatted images were created from the source data. This examination, like all CT scans performed in the [facility], was performed utilizing techniques to minimize radiation dose exposure, including the use of iterative reconstruction and automated exposure control. Radiation dose length product (DLP) for this visit: 175 mGy-cm IV Contrast: 85 mL of iohexol (OMNIPAQUE) FINDINGS: LUNGS: Solid right upper lobe mass measures 3.6 x 3.8 cm (series 3, image 79). There are air bronchograms and ectatic airways (for example series 4, image 42). The mass has spiculated margins and contacts the lateral pleural surface. 0.3 cm left lower lobe juxtapleural nodule (series 4, image 67). 0.3 cm right upper lobe juxta fissural nodule (series 4, image 51; series 602, image 44). 0.3 cm calcified left lower lobe granuloma (series 3, image 172) There is no tracheal or endobronchial lesion. PLEURA: Unremarkable. HEART/GREAT VESSELS: Heart is normal size. Coronary artery calcifications. No thoracic aortic aneurysm. Atherosclerotic calcifications of the thoracic aorta. MEDIASTINUM AND DUNCAN: 1.3 x 1.3 cm right hilar lymph node (series 2, image 53). CHEST WALL AND LOWER NECK: Unremarkable. VISUALIZED STRUCTURES IN THE UPPER ABDOMEN: Unremarkable. OSSEOUS STRUCTURES: No acute fracture or destructive osseous lesion.     Impression: Right upper lobe mass is concerning for a primary lung cancer. Mildly enlarged right hilar lymph node may be metastatic or reactive. Tissue sampling is advised. I personally discussed this study with PRINCESS PENA on 2/21/2024 12:12 PM. Resident: LEWIS DAVEY I, the attending radiologist, have reviewed the images and agree with the final report  above. Workstation performed: [id number]

## 2024-03-20 NOTE — TELEPHONE ENCOUNTER
Scheduled date of colonoscopy (as of today): 5/9/24  Physician performing colonoscopy: Dr. Sheth  Location of colonoscopy:   Bowel prep reviewed with patient: miralax  Instructions reviewed with patient by: tl given at appt  Clearances:n/a

## 2024-03-25 LAB — FUNGUS SPEC CULT: NORMAL

## 2024-03-26 LAB
MYCOBACTERIUM SPEC CULT: NORMAL
RHODAMINE-AURAMINE STN SPEC: NORMAL

## 2024-04-01 LAB — FUNGUS SPEC CULT: NORMAL

## 2024-04-02 DIAGNOSIS — E78.5 HYPERLIPIDEMIA, UNSPECIFIED HYPERLIPIDEMIA TYPE: ICD-10-CM

## 2024-04-02 DIAGNOSIS — I10 ESSENTIAL HYPERTENSION: ICD-10-CM

## 2024-04-02 LAB
MYCOBACTERIUM SPEC CULT: NORMAL
RHODAMINE-AURAMINE STN SPEC: NORMAL

## 2024-04-02 RX ORDER — METOPROLOL SUCCINATE 25 MG/1
TABLET, EXTENDED RELEASE ORAL
Qty: 90 TABLET | Refills: 1 | Status: SHIPPED | OUTPATIENT
Start: 2024-04-02

## 2024-04-02 RX ORDER — EZETIMIBE 10 MG/1
TABLET ORAL
Qty: 90 TABLET | Refills: 1 | Status: SHIPPED | OUTPATIENT
Start: 2024-04-02

## 2024-04-08 LAB — FUNGUS SPEC CULT: NORMAL

## 2024-04-09 LAB
MYCOBACTERIUM SPEC CULT: NORMAL
RHODAMINE-AURAMINE STN SPEC: NORMAL

## 2024-04-15 LAB — FUNGUS SPEC CULT: NORMAL

## 2024-04-16 ENCOUNTER — OFFICE VISIT (OUTPATIENT)
Dept: CARDIOLOGY CLINIC | Facility: MEDICAL CENTER | Age: 79
End: 2024-04-16
Payer: COMMERCIAL

## 2024-04-16 VITALS
OXYGEN SATURATION: 97 % | DIASTOLIC BLOOD PRESSURE: 70 MMHG | WEIGHT: 126 LBS | HEIGHT: 62 IN | BODY MASS INDEX: 23.19 KG/M2 | SYSTOLIC BLOOD PRESSURE: 128 MMHG | HEART RATE: 94 BPM

## 2024-04-16 DIAGNOSIS — R07.89 OTHER CHEST PAIN: Primary | ICD-10-CM

## 2024-04-16 DIAGNOSIS — I10 ESSENTIAL HYPERTENSION: ICD-10-CM

## 2024-04-16 DIAGNOSIS — I73.9 PAD (PERIPHERAL ARTERY DISEASE) (HCC): ICD-10-CM

## 2024-04-16 DIAGNOSIS — E78.2 MIXED HYPERLIPIDEMIA: ICD-10-CM

## 2024-04-16 PROCEDURE — 99214 OFFICE O/P EST MOD 30 MIN: CPT | Performed by: INTERNAL MEDICINE

## 2024-04-16 NOTE — PROGRESS NOTES
Cardiology   Freda Canas 78 y.o. female MRN: [id number]        Impression:  1. Chest pain - no evidence of myocardial ischemia. No further episodes.  2. Hypertension - continue current medications.    3. Dyslipidemia - slightly elevated on Zetia.  4. Moderate Bilateral LE PAD by vascular study - no PAD by vascular surgeon.     Recommendations:  1. Continue current medications.  2. Follow up in 12 months.         HPI: Freda Canas is a 78 y.o. year old female with PAD, hypertension and dyslipidemia who presents for follow up.  Had pharmacologic stress test instead of an exercise stress test b/c of elevated blood pressure - no ischemia.  Recently had R lung mass biopsied - no malignancy.           Review of Systems   Constitutional: Negative.    HENT: Negative.     Eyes: Negative.    Respiratory:  Negative for chest tightness and shortness of breath.    Cardiovascular:  Negative for chest pain, palpitations and leg swelling.   Gastrointestinal: Negative.    Endocrine: Negative.    Genitourinary: Negative.    Musculoskeletal: Negative.    Skin: Negative.    Allergic/Immunologic: Negative.    Neurological: Negative.    Hematological: Negative.    Psychiatric/Behavioral: Negative.     All other systems reviewed and are negative.        Past Medical History:   Diagnosis Date    Anxiety     Colon polyps     Essential hypertension 08/08/2018    Gallbladder polyp     Hyperlipidemia     Hypertension     Kidney cysts     Kidney stones     Lung disease      Past Surgical History:   Procedure Laterality Date    BRONCHOSCOPY N/A 3/12/2024    Procedure: NAVIGATIONAL BRONCHOSCOPY;  Surgeon: Brayan Ontiveros MD;  [facility]: [facility];  Service: Thoracic    COLONOSCOPY      COLONOSCOPY W/ POLYPECTOMY      ENDOBRONCHIAL ULTRASOUND (EBUS) N/A 3/12/2024    Procedure: ENDOBRONCHIAL ULTRASOUND (EBUS) with biopsy;  Surgeon: Brayan Ontiveros MD;  Location: [facility];  Service: Thoracic    HEMORRHOID SURGERY  1971    [place]  [facility] INCL FLUOR GDNCE DX W/CELL WASHG SPX N/A 3/12/2024    Procedure: FLEXIBLE BRONCHOSCOPY;  Surgeon: Brayan Ontiveros MD;  Location: [facility];  Service: Thoracic    WA EDG US EXAM SURGICAL ALTER STOM DUODENUM/JEJUNUM N/A 03/14/2019    Procedure: LINEAR ENDOSCOPIC U/S;  Surgeon: Roscoe Malagon MD;  Location: [facility];  Service: Gastroenterology     Social History     Substance and Sexual Activity   Alcohol Use Yes    Comment: social - rare      Social History     Substance and Sexual Activity   Drug Use No     Social History     Tobacco Use   Smoking Status Never    Passive exposure: Never   Smokeless Tobacco Never     Family History   Problem Relation Age of Onset    Hyperlipidemia Mother     Heart attack Father     Other Sister         open heart surgery     Heart murmur Sister     Stroke Sister     Hyperlipidemia Sister         all siblings     Breast cancer Sister 70    Parkinsonism Sister     Cancer Sister     Heart attack Brother         open heart surgery     Deep vein thrombosis Brother     Hyperlipidemia Brother     Coronary artery disease Brother         stents placed     Heart attack Paternal Aunt     Heart attack Paternal Uncle     Hypertension Family         all in the family both sides    No Known Problems Daughter     No Known Problems Maternal Grandmother     No Known Problems Maternal Grandfather     No Known Problems Paternal Grandmother     No Known Problems Paternal Grandfather     No Known Problems Sister     No Known Problems Brother     No Known Problems Son     No Known Problems Son     Anuerysm Neg Hx     Heart failure Neg Hx        Allergies:  Allergies   Allergen Reactions    Imdur [Isosorbide Nitrate] Headache    Sulfa Antibiotics Hives    Ciprofloxacin Rash and GI Intolerance    Macrobid [Nitrofurantoin] Other (See Comments)     Pt does not recall reaction       Medications:     Current Outpatient Medications:     amLODIPine (NORVASC) 5 mg tablet, TAKE ONE TABLET BY MOUTH ONCE  DAILY, Disp: 90 tablet, Rfl: 0    benazepril (LOTENSIN) 40 MG tablet, TAKE TWO TABLETS EVERY DAY IN THE EARLY MORNING, Disp: 180 tablet, Rfl: 1    Biotin 5 MG TABS, Take by mouth, Disp: , Rfl:     calcium-vitamin D 250-100 MG-UNIT per tablet, Take 1 tablet by mouth 2 (two) times a day, Disp: , Rfl:     ciclopirox (LOPROX) 0.77 % cream, , Disp: , Rfl:     Coenzyme Q10 (COQ-10) 100 MG CAPS, Take 1 capsule by mouth daily, Disp: , Rfl:     escitalopram (LEXAPRO) 10 mg tablet, TAKE ONE TABLET BY MOUTH AT BEDTIME, Disp: 90 tablet, Rfl: 1    ezetimibe (ZETIA) 10 mg tablet, TAKE ONE TABLET BY MOUTH ONCE DAILY, Disp: 90 tablet, Rfl: 1    Magnesium Oxide -Mg Supplement 400 MG CAPS, Take 1 capsule by mouth daily, Disp: , Rfl:     metoprolol succinate (TOPROL-XL) 25 mg 24 hr tablet, TAKE ONE TABLET BY MOUTH AT BEDTIME, Disp: 90 tablet, Rfl: 1    Omega-3 Fatty Acids (Omega-3 Fish Oil) 500 MG CAPS, Take by mouth, Disp: , Rfl:     Plant Sterols and Stanols (CHOLESTOFF PO), Take by mouth, Disp: , Rfl:     Red Yeast Rice 600 MG CAPS, Take 2 capsules by mouth daily, Disp: , Rfl:     alendronate (Fosamax) 70 mg tablet, Take 1 tablet (70 mg total) by mouth every 7 days, Disp: 12 tablet, Rfl: 3    lidocaine (Lidoderm) 5 %, Apply 1 patch topically over 12 hours daily for 7 days Remove & Discard patch within 12 hours or as directed by MD, Disp: 7 patch, Rfl: 0    naproxen (Naprosyn) 500 mg tablet, Take 1 tablet (500 mg total) by mouth 2 (two) times a day with meals, Disp: 30 tablet, Rfl: 0    Current Facility-Administered Medications:     cyanocobalamin injection 1,000 mcg, 1,000 mcg, Intramuscular, Q30 Days, DONALD Velásquez, 1,000 mcg at 06/12/23 1312    cyanocobalamin injection 1,000 mcg, 1,000 mcg, Intramuscular, Q30 Days, Shayne Ennis MD, 1,000 mcg at 10/19/23 0928       Wt Readings from Last 3 Encounters:   04/16/24 57.2 kg (126 lb)   03/20/24 56.9 kg (125 lb 6.4 oz)   03/12/24 57.2 kg (126 lb)     Temp Readings from Last 3  "Encounters:   03/12/24 98.8 °F (37.1 °C) (Temporal)   02/28/24 98.3 °F (36.8 °C) (Temporal)   02/23/24 98 °F (36.7 °C) (Temporal)     BP Readings from Last 3 Encounters:   04/16/24 128/70   03/20/24 122/74   03/12/24 127/56     Pulse Readings from Last 3 Encounters:   04/16/24 94   03/20/24 67   03/12/24 81         Physical Exam  HENT:      Head: Atraumatic.      Mouth/Throat:      Mouth: Mucous membranes are moist.   Eyes:      Extraocular Movements: Extraocular movements intact.   Cardiovascular:      Rate and Rhythm: Normal rate and regular rhythm.      Heart sounds: Normal heart sounds.   Pulmonary:      Effort: Pulmonary effort is normal.      Breath sounds: Normal breath sounds.   Abdominal:      General: Abdomen is flat.   Musculoskeletal:         General: Normal range of motion.      Cervical back: Normal range of motion.   Neurological:      General: No focal deficit present.      Mental Status: She is alert and oriented to person, place, and time.   Psychiatric:         Mood and Affect: Mood normal.         Behavior: Behavior normal.           Laboratory Studies:  CMP:  Lab Results   Component Value Date    K 4.3 01/06/2024     01/06/2024    CO2 28 01/06/2024    BUN 27 (H) 01/06/2024    CREATININE 0.92 01/06/2024    AST 24 01/06/2024    ALT 18 01/06/2024    EGFR 60 01/06/2024       Lipid Profile:   No results found for: \"CHOL\"  Lab Results   Component Value Date    HDL 46 (L) 01/06/2024     Lab Results   Component Value Date    LDLCALC 118 (H) 01/06/2024     Lab Results   Component Value Date    TRIG 88 01/06/2024               "

## 2024-04-23 LAB
MYCOBACTERIUM SPEC CULT: NORMAL
RHODAMINE-AURAMINE STN SPEC: NORMAL

## 2024-04-25 ENCOUNTER — OFFICE VISIT (OUTPATIENT)
Dept: FAMILY MEDICINE CLINIC | Facility: CLINIC | Age: 79
End: 2024-04-25
Payer: COMMERCIAL

## 2024-04-25 VITALS
DIASTOLIC BLOOD PRESSURE: 74 MMHG | HEART RATE: 72 BPM | OXYGEN SATURATION: 98 % | HEIGHT: 62 IN | WEIGHT: 124 LBS | SYSTOLIC BLOOD PRESSURE: 130 MMHG | BODY MASS INDEX: 22.82 KG/M2 | TEMPERATURE: 97.9 F
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
## 2024-04-25 PROCEDURE — 99213 OFFICE O/P EST LOW 20 MIN: CPT | Performed by: NURSE PRACTITIONER

## 2024-04-25 RX ORDER — BETAMETHASONE DIPROPIONATE 0.5 MG/G
CREAM TOPICAL 2 TIMES DAILY
Qty: 30 G | Refills: 1 | Status: SHIPPED | OUTPATIENT
Start: 2024-04-25

## 2024-04-25 NOTE — PROGRESS NOTES
Assessment/Plan:      Diagnoses and all orders for this visit:    Bug bite, initial encounter  -     betamethasone, augmented, (DIPROLENE-AF) 0.05 % cream; Apply topically 2 (two) times a day        Uncomplicated non-tick insect bite L side back neck. Dosing all possible side effects of the prescribed medications or medications that had been prescribed in the past were reviewed and all questions were answered.  Patient verbalized agreement and understanding of the plan of care as outlined during the office visit today return to office as indicated or sooner if a problem arises.    Subjective:     Patient ID: Freda Canas is a 78 y.o. female.    Patient has a red spot on the back of her neck and wants to make sure it is not a tick bite.        Review of Systems   Skin:  Positive for rash.         Objective:     Physical Exam  Skin:     Findings: Rash present.

## 2024-04-30 LAB
MYCOBACTERIUM SPEC CULT: NORMAL
RHODAMINE-AURAMINE STN SPEC: NORMAL

## 2024-05-09 ENCOUNTER — ANESTHESIA (OUTPATIENT)
Dept: GASTROENTEROLOGY | Facility: HOSPITAL | Age: 79
End: 2024-05-09

## 2024-05-09 ENCOUNTER — ANESTHESIA EVENT (OUTPATIENT)
Dept: GASTROENTEROLOGY | Facility: HOSPITAL | Age: 79
End: 2024-05-09

## 2024-05-09 ENCOUNTER — HOSPITAL ENCOUNTER (OUTPATIENT)
Dept: GASTROENTEROLOGY | Facility: HOSPITAL | Age: 79
Setting detail: OUTPATIENT SURGERY
End: 2024-05-09
Payer: COMMERCIAL

## 2024-05-09 VITALS
DIASTOLIC BLOOD PRESSURE: 72 MMHG | SYSTOLIC BLOOD PRESSURE: 140 MMHG | TEMPERATURE: 97.9 F | BODY MASS INDEX: 22.5 KG/M2 | HEART RATE: 65 BPM | RESPIRATION RATE: 16 BRPM | WEIGHT: 122.3 LBS | HEIGHT: 62 IN | OXYGEN SATURATION: 100 %

## 2024-05-09 DIAGNOSIS — Z86.010 HISTORY OF COLON POLYPS: ICD-10-CM

## 2024-05-09 PROCEDURE — 45385 COLONOSCOPY W/LESION REMOVAL: CPT | Performed by: INTERNAL MEDICINE

## 2024-05-09 RX ORDER — PROPOFOL 10 MG/ML
INJECTION, EMULSION INTRAVENOUS CONTINUOUS PRN
Status: DISCONTINUED | OUTPATIENT
Start: 2024-05-09 | End: 2024-05-09

## 2024-05-09 RX ORDER — SODIUM CHLORIDE, SODIUM LACTATE, POTASSIUM CHLORIDE, CALCIUM CHLORIDE 600; 310; 30; 20 MG/100ML; MG/100ML; MG/100ML; MG/100ML
INJECTION, SOLUTION INTRAVENOUS CONTINUOUS PRN
Status: DISCONTINUED | OUTPATIENT
Start: 2024-05-09 | End: 2024-05-09

## 2024-05-09 RX ORDER — PROPOFOL 10 MG/ML
INJECTION, EMULSION INTRAVENOUS AS NEEDED
Status: DISCONTINUED | OUTPATIENT
Start: 2024-05-09 | End: 2024-05-09

## 2024-05-09 RX ADMIN — PROPOFOL 50 MG: 10 INJECTION, EMULSION INTRAVENOUS at 15:07

## 2024-05-09 RX ADMIN — PROPOFOL 100 MCG/KG/MIN: 10 INJECTION, EMULSION INTRAVENOUS at 15:08

## 2024-05-09 RX ADMIN — PROPOFOL 25 MG: 10 INJECTION, EMULSION INTRAVENOUS at 15:08

## 2024-05-09 RX ADMIN — PROPOFOL 25 MG: 10 INJECTION, EMULSION INTRAVENOUS at 15:10

## 2024-05-09 RX ADMIN — SODIUM CHLORIDE, SODIUM LACTATE, POTASSIUM CHLORIDE, AND CALCIUM CHLORIDE: .6; .31; .03; .02 INJECTION, SOLUTION INTRAVENOUS at 13:26

## 2024-05-09 RX ADMIN — PROPOFOL 25 MG: 10 INJECTION, EMULSION INTRAVENOUS at 15:11

## 2024-05-09 NOTE — ANESTHESIA PREPROCEDURE EVALUATION
Procedure:  COLONOSCOPY    Relevant Problems   ANESTHESIA (within normal limits)      CARDIO   (+) Essential hypertension   (+) Hyperlipidemia   (+) Other chest pain   (+) PAD (peripheral artery disease) (HCC)      /RENAL   (+) Stage 3a chronic kidney disease (HCC)      NEURO/PSYCH   (+) Anxiety disorder   (+) Major depressive disorder with single episode, in remission (HCC)      Other   (+) History of colon polyps        Physical Exam    Airway    Mallampati score: II  TM Distance: >3 FB  Neck ROM: full     Dental   No notable dental hx     Cardiovascular  Rhythm: regular, Rate: normal, Cardiovascular exam normal    Pulmonary  Pulmonary exam normal Breath sounds clear to auscultation    Other Findings  post-pubertal.      Anesthesia Plan  ASA Score- 3     Anesthesia Type- IV sedation with anesthesia with ASA Monitors.         Additional Monitors:     Airway Plan:            Plan Factors-Exercise tolerance (METS): >4 METS.    Chart reviewed.   Existing labs reviewed. Patient summary reviewed.    Patient is not a current smoker.              Induction-     Postoperative Plan-     Informed Consent- Anesthetic plan and risks discussed with patient.  I personally reviewed this patient with the CRNA. Discussed and agreed on the Anesthesia Plan with the CRNA..

## 2024-05-09 NOTE — PERIOPERATIVE NURSING NOTE
"Pt called in from the ronna op waiting room. This RN asked pt about ride home details. Pt reported \"My  is in the ED\". \"He was sent there by his primary doctor\". Pt educated on the need to have a  after having anaesthesia. Pt inform  this RN  that if her  is admitted, he cannot provide her a ride home. Pt became upset and asked this RN to contact her  for the daughters phone number.     This RN reported situation to Dr. Sheth & GI care team. Meryl referring this RN to contact the direct supervisor. This RN notifying P[facility], Mayram & ALEJANDRINA Enrique [facility] charge nurse notified of the situation. Maryam recommending this RN contact the ED and see if we could obtain a phone number for emergency contact. This RN called the ED @ [facility] and spoke with Ashanti Charge RN. Ashanti providing phone number for daughter Radha.    This RN contacting Radha. Radha notified of her parents situation. Radha able to provide mom with a ride home. Radha stating \" me or my daughter Ara will be there to pick her up\". Anesthesia made aware  "

## 2024-05-09 NOTE — ANESTHESIA POSTPROCEDURE EVALUATION
Post-Op Assessment Note    CV Status:  Stable    Pain management: adequate       Mental Status:  Sleepy   Hydration Status:  Euvolemic   PONV Controlled:  Controlled   Airway Patency:  Patent     Post Op Vitals Reviewed: Yes    No anethesia notable event occurred.    Staff: Anesthesiologist, CRNA               BP   137/63   Temp   98   Pulse  69   Resp   16   SpO2   97

## 2024-05-09 NOTE — PERIOPERATIVE NURSING NOTE
Pt awake & alert and expressing understanding of discharge instructions, no complaints of pain. Pt tolerated snack& beverage uneventfully. IV site removed. Pt getting dressed

## 2024-05-11 DIAGNOSIS — I10 ESSENTIAL HYPERTENSION: ICD-10-CM

## 2024-05-13 RX ORDER — AMLODIPINE BESYLATE 5 MG/1
TABLET ORAL
Qty: 90 TABLET | Refills: 1 | Status: SHIPPED | OUTPATIENT
Start: 2024-05-13

## 2024-06-03 ENCOUNTER — HOSPITAL ENCOUNTER (OUTPATIENT)
Dept: RADIOLOGY | Facility: MEDICAL CENTER | Age: 79
Discharge: HOME | End: 2024-06-03
Payer: COMMERCIAL

## 2024-06-03 DIAGNOSIS — R91.8 MASS OF UPPER LOBE OF RIGHT LUNG: ICD-10-CM

## 2024-06-03 PROCEDURE — 71250 CT THORAX DX C-: CPT

## 2024-06-10 DIAGNOSIS — F32.5 MAJOR DEPRESSIVE DISORDER WITH SINGLE EPISODE, IN REMISSION (HCC): ICD-10-CM

## 2024-06-10 RX ORDER — ESCITALOPRAM OXALATE 10 MG/1
TABLET ORAL
Qty: 90 TABLET | Refills: 1 | Status: SHIPPED | OUTPATIENT
Start: 2024-06-10

## 2024-06-12 ENCOUNTER — OFFICE VISIT (OUTPATIENT)
Dept: CARDIAC SURGERY | Facility: CLINIC | Age: 79
End: 2024-06-12
Payer: COMMERCIAL

## 2024-06-12 VITALS
SYSTOLIC BLOOD PRESSURE: 128 MMHG | HEIGHT: 62 IN | OXYGEN SATURATION: 99 % | DIASTOLIC BLOOD PRESSURE: 64 MMHG | RESPIRATION RATE: 14 BRPM | HEART RATE: 66 BPM | BODY MASS INDEX: 22.88 KG/M2 | TEMPERATURE: 98.2 F | WEIGHT: 124.34 LBS

## 2024-06-12 DIAGNOSIS — R91.8 MASS OF UPPER LOBE OF RIGHT LUNG: Primary | ICD-10-CM

## 2024-06-12 PROCEDURE — 99213 OFFICE O/P EST LOW 20 MIN: CPT | Performed by: THORACIC SURGERY (CARDIOTHORACIC VASCULAR SURGERY)

## 2024-06-12 NOTE — PROGRESS NOTES
Assessment/Plan:    Mass of upper lobe of right lung  Since being last seen, the workup of Ms. Canas's right upper lobe mass has been negative.  Pathology was negative for malignancy on navigational bronchoscopy, the area was mildly PET avid, and most recent CT scan completed on 6/3/2024 demonstrates resolution of the mass.  Dr. Ontiveros discussed that on recent CT there is a tiny residual amount of inflammation in the area of the wants 3.8 cm right upper lobe mass.  Dr. Ontiveros discussed that this was likely a lung infection that has now resolved.  Patient recommended to have a repeat CT scan of the chest in 6 months to ensure complete resolution. Patient instructed to call the office sooner if she experiences any major changes in breathing, persistent cough, or hemoptysis.        Diagnoses and all orders for this visit:    Mass of upper lobe of right lung  -     CT chest wo contrast; Future          Thoracic History     Diagnosis: Right upper lobe mass   Procedures/Surgeries: 3/12/24 navigational bronchoscopy, EBUS, flexible bronchoscopy   Pathology: 3/12/24 mild chronic inflammation and reactive type II pneumocyte hyperplasia, no dysplasia or neoplasm identified; brushings and bronchioloalveolar lavage negative for malignancy    Cancer Staging   No matching staging information was found for the patient.    Oncology History    No history exists.            Subjective:    Patient ID: Freda Canas is a 78 y.o. female.    HPI    Freda Canas is a 78 y.o. female who returns in evaluation of a right upper lobe lung mass.  At time of last office visit which was a consultation visit on 2/28/2024, patient was recommended to have a biopsy on this right upper lobe lung mass, PET CT scan, and PFTs.    Pathology from navigational bronchoscopy performed on 3/12/2024 was negative for malignancy.    PET CT scan was completed on 3/18/2024 was personally reviewed by myself and in PACS with mild FDG avidity of  right upper lobe mass with an SUV max of 2.7.  Right hilar activity is subtle and the mild enlarged right hilar lymph node with an SUV max of 2.2.    PFTs completed on 3/18/24 with FEV1 of 2.35 L, 123% predicted, FVC 3.03 L, 2022% predicted, corrected DLCO 72% predicted.    Given the results of negative pathology on the navigational bronchoscopy and decrease in density of the mass on PET scan, repeat CT scan in 3 months was recommended.  CT scan chest performed on 6/3/24 was personally reviewed by myself and in PACS and reveals resolved right upper lobe consolidation with small amount of residual groundglass and bronchiolectasis measuring 0.4 x 0.4 cm opacity at site of prior consolidation.    On discussion today, patient reports she is doing well. States that her breathing does not affect her activity. She goes to the gym 4-5 times weekly. Notes occasional pressure making breathing more difficult if going uphill, for which she takes her time when performing ambulation uphill. Denies cough, fevers, chills.  Denies recent lung infections.    The following portions of the patient's history were reviewed and updated as appropriate: allergies, current medications, past family history, past medical history, past social history, past surgical history and problem list.    Review of Systems   Constitutional:  Negative for chills, fever and unexpected weight change.   Respiratory:  Negative for cough and shortness of breath.          Objective:   Physical Exam  Constitutional:       General: She is not in acute distress.  HENT:      Head: Normocephalic and atraumatic.      Nose: Nose normal.   Eyes:      Extraocular Movements: Extraocular movements intact.   Cardiovascular:      Rate and Rhythm: Normal rate and regular rhythm.   Pulmonary:      Effort: Pulmonary effort is normal.      Breath sounds: Normal breath sounds.   Abdominal:      Palpations: Abdomen is soft.      Tenderness: There is no abdominal tenderness.  "  Musculoskeletal:      Right lower leg: No edema.      Left lower leg: No edema.   Skin:     General: Skin is warm and dry.     /64 (BP Location: Right arm, Patient Position: Sitting, Cuff Size: Standard)   Pulse 66   Temp 98.2 °F (36.8 °C) (Temporal)   Resp 14   Ht 5' 2\" (1.575 m)   Wt 56.4 kg (124 lb 5.4 oz)   SpO2 99%   BMI 22.74 kg/m²     No Chest XR results available for this patient.   CT chest wo contrast    Result Date: 6/11/2024  Narrative CT CHEST WITHOUT IV CONTRAST INDICATION: R91.8: Other nonspecific abnormal finding of lung field. Transbronchial biopsy of the right upper lobe on March 12, 2024 yielded benign findings. COMPARISON: CT chest February 21, 2024. Correlation with PET/CT March 18, 2024 TECHNIQUE: CT examination of the chest was performed without intravenous contrast. Multiplanar 2D reformatted images were created from the source data. This examination, like all CT scans performed in the [facility], was performed utilizing techniques to minimize radiation dose exposure, including the use of iterative reconstruction and automated exposure control. Radiation dose length product (DLP) for this visit: 146 mGy-cm FINDINGS: LUNGS: Resolved right upper lobe consolidation with small amount of residual groundglass and bronchiolectasis (for example series 3, image 74). 0.4 x 0.4 cm opacity at site of prior consolidation (series 3, image 73; series 601, image 61). Unchanged 0.3 cm juxtapleural left lower lobe nodule (series 3, image 139 PLEURA: Unremarkable. HEART/GREAT VESSELS: Normal heart size. Coronary calcifications and atherosclerotic calcifications of the aorta. No thoracic aortic aneurysm. MEDIASTINUM AND DUNCAN: Unremarkable. CHEST WALL AND LOWER NECK: Unremarkable. VISUALIZED STRUCTURES IN THE UPPER ABDOMEN: Unremarkable. OSSEOUS STRUCTURES: No acute fracture or destructive osseous lesion.     Impression Since February 21, 2024, resolved right upper lobe pneumonia " with small amount of residual scarring. A 0.4 cm opacity at the site is more likely scarring than a nodule. Follow-up noncontrast chest CT is advised in 6-12 months. Workstation performed: [id number]     CT chest w contrast    Result Date: 2/21/2024  Narrative CT CHEST WITH IV CONTRAST INDICATION: R93.89: Abnormal findings on diagnostic imaging of other specified body structures. COMPARISON: No prior CT chest. Correlation with radiographs of the left ribs 2/4/2024 TECHNIQUE: CT examination of the chest was performed. Multiplanar 2D reformatted images were created from the source data. This examination, like all CT scans performed in the [facility], was performed utilizing techniques to minimize radiation dose exposure, including the use of iterative reconstruction and automated exposure control. Radiation dose length product (DLP) for this visit: 175 mGy-cm IV Contrast: 85 mL of iohexol (OMNIPAQUE) FINDINGS: LUNGS: Solid right upper lobe mass measures 3.6 x 3.8 cm (series 3, image 79). There are air bronchograms and ectatic airways (for example series 4, image 42). The mass has spiculated margins and contacts the lateral pleural surface. 0.3 cm left lower lobe juxtapleural nodule (series 4, image 67). 0.3 cm right upper lobe juxta fissural nodule (series 4, image 51; series 602, image 44). 0.3 cm calcified left lower lobe granuloma (series 3, image 172) There is no tracheal or endobronchial lesion. PLEURA: Unremarkable. HEART/GREAT VESSELS: Heart is normal size. Coronary artery calcifications. No thoracic aortic aneurysm. Atherosclerotic calcifications of the thoracic aorta. MEDIASTINUM AND DUNCAN: 1.3 x 1.3 cm right hilar lymph node (series 2, image 53). CHEST WALL AND LOWER NECK: Unremarkable. VISUALIZED STRUCTURES IN THE UPPER ABDOMEN: Unremarkable. OSSEOUS STRUCTURES: No acute fracture or destructive osseous lesion.     Impression Right upper lobe mass is concerning for a primary lung cancer.  Mildly enlarged right hilar lymph node may be metastatic or reactive. Tissue sampling is advised. I personally discussed this study with PRINCESS MARTELDOMINIQUEJACQUELYN on 2/21/2024 12:12 PM. Resident: LEWIS DAVEY I, the attending radiologist, have reviewed the images and agree with the final report above. Workstation performed: [id number]     No CT Chest,Abdomen,Pelvis results available for this patient.   NM PET CT skull base to mid thigh    Result Date: 3/18/2024  Narrative PET/CT SCAN INDICATION: D38.1: Neoplasm of uncertain behavior of trachea, bronchus and lung R91.8: Other nonspecific abnormal finding of lung field, right upper lobe lung mass and mildly enlarged right hilar lymph node MODIFIER: PI COMPARISON: CT of chest dated 2/21/2024 and CT renal stone study dated 2/4/2024. CT renal protocol dated 1/21/2019. CELL TYPE: Not applicable TECHNIQUE:   8.6 mCi F-18-FDG administered IV. Multiplanar attenuation corrected and non attenuation corrected PET images are available for interpretation, and contiguous, low dose, axial CT sections were obtained from the skull base through the femurs. Intravenous contrast material was not utilized. This examination, like all CT scans performed in the [facility], was performed utilizing techniques to minimize radiation dose exposure, including the use of iterative reconstruction and automated exposure control. Fasting serum glucose: 95 mg/dl FINDINGS: VISUALIZED BRAIN: No acute abnormalities are seen. HEAD/NECK: There is a physiologic distribution of FDG. No FDG avid cervical adenopathy is seen. CT images: Unremarkable. CHEST: Patient's known right upper lobe masslike opacity is mildly FDG avid and essentially stable in size but appears less dense than prior CT dated 2/21/2024, currently measuring approximately 4.0 x 2.8 cm on image 82 series 3 with max SUV of 2.7. The interval decrease in density could reflect an inflammatory etiology although underlying hypermetabolic  malignancy remains the diagnosis of exclusion. On PET image 90 and poorly characterized on low-dose unenhanced CT is subtle right hilar activity in the expected location of mildly enlarged right hilar lymph node seen on recent contrast-enhanced CT of chest with max SUV of 2.2 in this region which could reflect inflammatory/physiologic activity with early asael metastatic disease not excluded. CT images: Atherosclerotic vascular calcifications including those of the coronary arteries are noted. On image 99 of series 3 there is a subpleural 2 mm left lower lobe lung nodule. Patient's known additional tiny lung nodules were better characterized on prior dedicated CT of chest and are beyond the limits of resolution for PET/CT. ABDOMEN: No FDG avid soft tissue lesions are seen. CT images: Atherosclerotic vascular calcifications are noted. On image 137 of series 3 again noted is an indeterminant 1.9 cm isodense left lower pole renal mass, which was previously characterized to reflect a hyperdense cyst on CT renal protocol dated 1/21/2019. PELVIS: No FDG avid soft tissue lesions are seen. CT images: Unremarkable. OSSEOUS STRUCTURES: No FDG avid lesions are seen. CT images: No significant findings.     Impression 1. Hypermetabolic right upper lobe masslike opacity is essentially stable in size since prior CT but is decreased in density. While the mild interval decrease in density could reflect an improving inflammatory process, underlying hypermetabolic malignancy remains the diagnosis of exclusion. Further evaluation with tissue sampling is recommended as clinically indicated. If tissue sampling is not performed, continued short interval follow-up with CT of chest in 3 months is recommended to ensure stability. 2. Subtle low-level activity in the right hilar region and expected location of mildly enlarged right hilar lymph nodes seen on recent CT. Findings are nonspecific and could reflect inflammatory activity with  underlying asael metastatic disease not excluded. 3. Given possible underlying malignancy, short interval follow-up with CT of chest in 3 months is recommended to ensure stability patient's known 3 mm lung nodules which are better characterized on prior dedicated CT of chest dated 2/21/2024. Please see above for details and additional findings. The study was marked in EPIC for significant notification. The study was marked in Epic for follow-up. Workstation performed: [id number]      No Barium Swallow results available for this patient.

## 2024-06-13 NOTE — ASSESSMENT & PLAN NOTE
Since being last seen, the workup of Ms. Canas's right upper lobe mass has been negative.  Pathology was negative for malignancy on navigational bronchoscopy, the area was mildly PET avid, and most recent CT scan completed on 6/3/2024 demonstrates resolution of the mass.  Dr. Ontiveros discussed that on recent CT there is a tiny residual amount of inflammation in the area of the wants 3.8 cm right upper lobe mass.  Dr. Ontiveros discussed that this was likely a lung infection that has now resolved.  Patient recommended to have a repeat CT scan of the chest in 6 months to ensure complete resolution. Patient instructed to call the office sooner if she experiences any major changes in breathing, persistent cough, or hemoptysis.

## 2024-07-15 ENCOUNTER — LAB (OUTPATIENT)
Dept: LAB | Facility: MEDICAL CENTER | Age: 79
End: 2024-07-15
Payer: COMMERCIAL

## 2024-07-15 DIAGNOSIS — E53.8 B12 DEFICIENCY: ICD-10-CM

## 2024-07-15 DIAGNOSIS — E78.2 MIXED HYPERLIPIDEMIA: ICD-10-CM

## 2024-07-15 LAB
ALBUMIN SERPL BCG-MCNC: 3.9 G/DL (ref 3.5–5)
ALP SERPL-CCNC: 44 U/L (ref 34–104)
ALT SERPL W P-5'-P-CCNC: 14 U/L (ref 7–52)
ANION GAP SERPL CALCULATED.3IONS-SCNC: 7 MMOL/L (ref 4–13)
AST SERPL W P-5'-P-CCNC: 22 U/L (ref 13–39)
BILIRUB SERPL-MCNC: 0.38 MG/DL (ref 0.2–1)
BUN SERPL-MCNC: 25 MG/DL (ref 5–25)
CALCIUM SERPL-MCNC: 9.1 MG/DL (ref 8.4–10.2)
CHLORIDE SERPL-SCNC: 105 MMOL/L (ref 96–108)
CHOLEST SERPL-MCNC: 177 MG/DL
CO2 SERPL-SCNC: 29 MMOL/L (ref 21–32)
CREAT SERPL-MCNC: 0.84 MG/DL (ref 0.6–1.3)
GFR SERPL CREATININE-BSD FRML MDRD: 67 ML/MIN/1.73SQ M
GLUCOSE P FAST SERPL-MCNC: 86 MG/DL (ref 65–99)
HDLC SERPL-MCNC: 41 MG/DL
LDLC SERPL CALC-MCNC: 113 MG/DL (ref 0–100)
POTASSIUM SERPL-SCNC: 4 MMOL/L (ref 3.5–5.3)
PROT SERPL-MCNC: 6.7 G/DL (ref 6.4–8.4)
SODIUM SERPL-SCNC: 141 MMOL/L (ref 135–147)
TRIGL SERPL-MCNC: 114 MG/DL
VIT B12 SERPL-MCNC: 569 PG/ML (ref 180–914)

## 2024-07-15 PROCEDURE — 82607 VITAMIN B-12: CPT

## 2024-07-15 PROCEDURE — 80061 LIPID PANEL: CPT

## 2024-07-15 PROCEDURE — 80053 COMPREHEN METABOLIC PANEL: CPT

## 2024-07-15 PROCEDURE — 36415 COLL VENOUS BLD VENIPUNCTURE: CPT

## 2024-07-18 NOTE — ASSESSMENT & PLAN NOTE
Lab Results   Component Value Date    EGFR 67 07/15/2024    EGFR 60 01/06/2024    EGFR 54 07/06/2023    CREATININE 0.84 07/15/2024    CREATININE 0.92 01/06/2024    CREATININE 1.01 07/06/2023   Pt to avoid NSAIDs and any IV dyes. Patient to follow up eoither with nephrology or  with us for  further  monitoring of  renal function.

## 2024-07-18 NOTE — PROGRESS NOTES
Ambulatory Visit  Name: Freda Canas      : 1945      MRN: [id number]  Encounter Provider: Shayne Ennis MD  Encounter Date: 2024   Encounter department: [facility]    Assessment & Plan   1. Well adult exam  2. Anxiety disorder, unspecified type  Assessment & Plan:  Patient to continue utilizing medical therapy as well as counseling sources as applicable to condition. If suicidal thought or fear of suicide attempt, to call 911 and seek help immediately. Medications and therapy reviewed today and all patient  questions answered today.   3. Essential hypertension  Assessment & Plan:  Patient is stable with current anti-hypertensive medicine and continue to follow a low sodium diet and take current medication. All questions about this condition were answered today.   Orders:  -     benazepril (LOTENSIN) 40 MG tablet; Take 1 tablet (40 mg total) by mouth daily  -     amLODIPine (NORVASC) 5 mg tablet; Take 1 tablet (5 mg total) by mouth daily  -     metoprolol succinate (TOPROL-XL) 25 mg 24 hr tablet; Take 1 tablet (25 mg total) by mouth daily at bedtime  -     Comprehensive metabolic panel; Future  -     CBC and differential; Future  -     TSH, 3rd generation with Free T4 reflex; Future  -     Magnesium; Future  -     Uric acid; Future  -     UA/M w/rflx Culture, Comp  -     Comprehensive metabolic panel  -     CBC and differential  -     TSH, 3rd generation with Free T4 reflex  -     Magnesium  -     Uric acid  4. Mixed hyperlipidemia  Assessment & Plan:  Patient  is stable with current medication and we discussed a low fat low cholesterol diet. Weight loss also discussed for this will help lower cholesterol also. Recheck lipids in 6 months.   Orders:  -     Lipid Panel with Direct LDL reflex; Future  -     Lipid Panel with Direct LDL reflex  5. Major depressive disorder with single episode, in remission (HCC)  Assessment & Plan:  Patient to continue utilizing medical  therapy as well and counseling sources as applicable for condition. If  suicidal thought or fear of suicide to contact 911 and seek help immediately. Meds reviewed and patient questions answered today   Orders:  -     escitalopram (LEXAPRO) 10 mg tablet; Take 1 tablet (10 mg total) by mouth daily at bedtime  6. Stage 3a chronic kidney disease (HCC)  Assessment & Plan:  Lab Results   Component Value Date    EGFR 67 07/15/2024    EGFR 60 01/06/2024    EGFR 54 07/06/2023    CREATININE 0.84 07/15/2024    CREATININE 0.92 01/06/2024    CREATININE 1.01 07/06/2023   Pt to avoid NSAIDs and any IV dyes. Patient to follow up eoither with nephrology or  with us for  further  monitoring of  renal function.   7. Hyperlipidemia, unspecified hyperlipidemia type  Assessment & Plan:  Patient  is stable with current medication and we discussed a low fat low cholesterol diet. Weight loss also discussed for this will help lower cholesterol also. Recheck lipids in 6 months.   Orders:  -     ezetimibe (ZETIA) 10 mg tablet; Take 1 tablet (10 mg total) by mouth daily      Falls Plan of Care: balance, strength, and gait training instructions were provided. Home safety education provided.     Urinary Incontinence Plan of Care: counseling topics discussed: practice Kegel (pelvic floor strengthening) exercises, use restroom every 2 hours, limit alcohol, caffeine, spicy foods, and acidic foods, limiting fluid intake 3-4 hours before bed, weight loss, preventing constipation and limiting fluid intake to 60 oz. per day.       History of Present Illness     Pt here for medicare wellness and checkup on multiple medical problems. Pt with HTN. Lipids, anxiety and recently had R lung mass diagnosed as a pneumonia and  not as a carcinoma. Great news. Pt  does not need refills at this point.       Review of Systems  Past Medical History:   Diagnosis Date   • Anxiety    • Colon polyps    • Essential hypertension 08/08/2018   • Gallbladder polyp    •  Hyperlipidemia    • Hypertension    • Kidney cysts    • Kidney stones    • Lung disease      Past Surgical History:   Procedure Laterality Date   • BRONCHOSCOPY N/A 3/12/2024    Procedure: NAVIGATIONAL BRONCHOSCOPY;  Surgeon: Brayan Ontiveros MD;  Location: [facility];  Service: Thoracic   • COLONOSCOPY     • COLONOSCOPY W/ POLYPECTOMY     • ENDOBRONCHIAL ULTRASOUND (EBUS) N/A 3/12/2024    Procedure: ENDOBRONCHIAL ULTRASOUND (EBUS) with biopsy;  Surgeon: Brayan Ontiveros MD;  Location: [facility];  Service: Thoracic   • HEMORRHOID SURGERY  1971   • AL BRNCHSC INCL FLUOR GDNCE DX W/CELL WASHG SPX N/A 3/12/2024    Procedure: FLEXIBLE BRONCHOSCOPY;  Surgeon: Brayan Ontiveros MD;  Location: [facility];  Service: Thoracic   • AL EDG US EXAM SURGICAL ALTER STOM DUODENUM/JEJUNUM N/A 03/14/2019    Procedure: LINEAR ENDOSCOPIC U/S;  Surgeon: Roscoe Malagon MD;  Location: [facility];  Service: Gastroenterology     Family History   Problem Relation Age of Onset   • Hyperlipidemia Mother    • Heart attack Father    • Other Sister         open heart surgery    • Heart murmur Sister    • Stroke Sister    • Hyperlipidemia Sister         all siblings    • Breast cancer Sister 70   • Parkinsonism Sister    • Cancer Sister    • Heart attack Brother         open heart surgery    • Deep vein thrombosis Brother    • Hyperlipidemia Brother    • Coronary artery disease Brother         stents placed    • Heart attack Paternal Aunt    • Heart attack Paternal Uncle    • Hypertension Family         all in the family both sides   • No Known Problems Daughter    • No Known Problems Maternal Grandmother    • No Known Problems Maternal Grandfather    • No Known Problems Paternal Grandmother    • No Known Problems Paternal Grandfather    • No Known Problems Sister    • No Known Problems Brother    • No Known Problems Son    • No Known Problems Son    • Anuerysm Neg Hx    • Heart failure Neg Hx      Social History     Tobacco Use   • Smoking  status: Never     Passive exposure: Never   • Smokeless tobacco: Never   Vaping Use   • Vaping status: Never Used   Substance and Sexual Activity   • Alcohol use: Yes     Comment: social - rare    • Drug use: No   • Sexual activity: Yes     Partners: Male     Birth control/protection: None     Current Outpatient Medications on File Prior to Visit   Medication Sig   • betamethasone, augmented, (DIPROLENE-AF) 0.05 % cream Apply topically 2 (two) times a day   • Biotin 5 MG TABS Take by mouth   • calcium-vitamin D 250-100 MG-UNIT per tablet Take 1 tablet by mouth 2 (two) times a day   • ciclopirox (LOPROX) 0.77 % cream    • Coenzyme Q10 (COQ-10) 100 MG CAPS Take 1 capsule by mouth daily   • Magnesium Oxide -Mg Supplement 400 MG CAPS Take 1 capsule by mouth daily   • Omega-3 Fatty Acids (Omega-3 Fish Oil) 500 MG CAPS Take by mouth   • Plant Sterols and Stanols (CHOLESTOFF PO) Take by mouth   • Red Yeast Rice 600 MG CAPS Take 2 capsules by mouth daily   • [DISCONTINUED] amLODIPine (NORVASC) 5 mg tablet TAKE ONE TABLET BY MOUTH ONCE DAILY   • [DISCONTINUED] escitalopram (LEXAPRO) 10 mg tablet TAKE ONE TABLET BY MOUTH AT BEDTIME   • [DISCONTINUED] ezetimibe (ZETIA) 10 mg tablet TAKE ONE TABLET BY MOUTH ONCE DAILY   • [DISCONTINUED] metoprolol succinate (TOPROL-XL) 25 mg 24 hr tablet TAKE ONE TABLET BY MOUTH AT BEDTIME   • lidocaine (Lidoderm) 5 % Apply 1 patch topically over 12 hours daily for 7 days Remove & Discard patch within 12 hours or as directed by MD   • naproxen (Naprosyn) 500 mg tablet Take 1 tablet (500 mg total) by mouth 2 (two) times a day with meals   • [DISCONTINUED] alendronate (Fosamax) 70 mg tablet Take 1 tablet (70 mg total) by mouth every 7 days   • [DISCONTINUED] benazepril (LOTENSIN) 40 MG tablet TAKE TWO TABLETS EVERY DAY IN THE EARLY MORNING     Allergies   Allergen Reactions   • Imdur [Isosorbide Nitrate] Headache   • Sulfa Antibiotics Hives   • Ciprofloxacin Rash and GI Intolerance   • Macrobid  "[Nitrofurantoin] Other (See Comments)     Pt does not recall reaction     Immunization History   Administered Date(s) Administered   • COVID-19 PFIZER VACCINE 0.3 ML IM 02/17/2021, 03/09/2021, 11/02/2021   • COVID-19 Pfizer mRNA vacc PF maxime-sucrose 12 yr and older (Comirnaty) 10/31/2023   • COVID-19 Pfizer vac (Maxime-sucrose, gray cap) 12 yr+ IM 07/21/2022   • INFLUENZA 11/11/2014, 11/18/2016, 11/01/2018, 10/07/2019, 09/09/2020, 10/18/2022, 10/05/2023   • Influenza, high dose seasonal 0.7 mL 12/17/2021   • Pneumococcal Conjugate 13-Valent 11/11/2014   • Pneumococcal Polysaccharide PPV23 11/10/2015   • Zoster Vaccine Recombinant 10/18/2022, 01/12/2023     Objective     /68 (BP Location: Left arm, Patient Position: Sitting, Cuff Size: Standard)   Pulse 74   Temp 97.5 °F (36.4 °C)   Resp 18   Ht 5' 2\" (1.575 m)   Wt 56.8 kg (125 lb 3.2 oz)   SpO2 94%   BMI 22.90 kg/m²     Physical Exam  Constitutional:       Appearance: Normal appearance. She is not ill-appearing.   HENT:      Head: Normocephalic and atraumatic.      Right Ear: Tympanic membrane normal.      Left Ear: Tympanic membrane normal.      Nose: Nose normal.      Mouth/Throat:      Mouth: Mucous membranes are moist.   Eyes:      Extraocular Movements: Extraocular movements intact.      Conjunctiva/sclera: Conjunctivae normal.      Pupils: Pupils are equal, round, and reactive to light.   Cardiovascular:      Rate and Rhythm: Normal rate and regular rhythm.   Pulmonary:      Effort: Pulmonary effort is normal. No respiratory distress.      Breath sounds: Normal breath sounds. No wheezing.   Abdominal:      General: Bowel sounds are normal.      Palpations: Abdomen is soft.      Tenderness: There is no abdominal tenderness.   Musculoskeletal:         General: No tenderness. Normal range of motion.      Cervical back: Normal range of motion and neck supple.      Right lower leg: No edema.      Left lower leg: No edema.   Skin:     General: Skin is " warm and dry.   Neurological:      Mental Status: She is alert and oriented to person, place, and time.   Psychiatric:         Mood and Affect: Mood normal.         Behavior: Behavior normal.         Thought Content: Thought content normal.         Judgment: Judgment normal.

## 2024-07-19 ENCOUNTER — OFFICE VISIT (OUTPATIENT)
Dept: FAMILY MEDICINE CLINIC | Facility: CLINIC | Age: 79
End: 2024-07-19
Payer: COMMERCIAL

## 2024-07-19 VITALS
WEIGHT: 125.2 LBS | DIASTOLIC BLOOD PRESSURE: 68 MMHG | TEMPERATURE: 97.5 F | RESPIRATION RATE: 18 BRPM | BODY MASS INDEX: 23.04 KG/M2 | HEIGHT: 62 IN | SYSTOLIC BLOOD PRESSURE: 128 MMHG | OXYGEN SATURATION: 94 % | HEART RATE: 74 BPM

## 2024-07-19 DIAGNOSIS — E78.5 HYPERLIPIDEMIA, UNSPECIFIED HYPERLIPIDEMIA TYPE: ICD-10-CM

## 2024-07-19 DIAGNOSIS — F41.9 ANXIETY DISORDER, UNSPECIFIED TYPE: ICD-10-CM

## 2024-07-19 DIAGNOSIS — Z00.00 WELL ADULT EXAM: Primary | ICD-10-CM

## 2024-07-19 DIAGNOSIS — E78.2 MIXED HYPERLIPIDEMIA: ICD-10-CM

## 2024-07-19 DIAGNOSIS — I10 ESSENTIAL HYPERTENSION: ICD-10-CM

## 2024-07-19 DIAGNOSIS — N18.31 STAGE 3A CHRONIC KIDNEY DISEASE (HCC): ICD-10-CM

## 2024-07-19 DIAGNOSIS — F32.5 MAJOR DEPRESSIVE DISORDER WITH SINGLE EPISODE, IN REMISSION (HCC): ICD-10-CM

## 2024-07-19 PROCEDURE — 99214 OFFICE O/P EST MOD 30 MIN: CPT | Performed by: FAMILY MEDICINE

## 2024-07-19 PROCEDURE — G0439 PPPS, SUBSEQ VISIT: HCPCS | Performed by: FAMILY MEDICINE

## 2024-07-19 RX ORDER — AMLODIPINE BESYLATE 5 MG/1
5 TABLET ORAL DAILY
Start: 2024-07-19

## 2024-07-19 RX ORDER — BENAZEPRIL HYDROCHLORIDE 40 MG/1
TABLET ORAL
Qty: 180 TABLET | Refills: 1 | Status: SHIPPED | OUTPATIENT
Start: 2024-07-19 | End: 2024-07-19 | Stop reason: SDUPTHER

## 2024-07-19 RX ORDER — BENAZEPRIL HYDROCHLORIDE 40 MG/1
40 TABLET ORAL DAILY
Start: 2024-07-19

## 2024-07-19 RX ORDER — EZETIMIBE 10 MG/1
10 TABLET ORAL DAILY
Start: 2024-07-19

## 2024-07-19 RX ORDER — ESCITALOPRAM OXALATE 10 MG/1
10 TABLET ORAL
Start: 2024-07-19

## 2024-07-19 RX ORDER — METOPROLOL SUCCINATE 25 MG/1
25 TABLET, EXTENDED RELEASE ORAL
Start: 2024-07-19

## 2024-07-19 NOTE — PROGRESS NOTES
Ambulatory Visit  Name: Freda Canas      : 1945      MRN: [id number]  Encounter Provider: Shayne Ennis MD  Encounter Date: 2024   Encounter department: [facility]    Assessment & Plan   1. Well adult exam  2. Anxiety disorder, unspecified type  Assessment & Plan:  Patient to continue utilizing medical therapy as well as counseling sources as applicable to condition. If suicidal thought or fear of suicide attempt, to call 911 and seek help immediately. Medications and therapy reviewed today and all patient  questions answered today.   3. Essential hypertension  Assessment & Plan:  Patient is stable with current anti-hypertensive medicine and continue to follow a low sodium diet and take current medication. All questions about this condition were answered today.   Orders:  -     benazepril (LOTENSIN) 40 MG tablet; Take 1 tablet (40 mg total) by mouth daily  -     amLODIPine (NORVASC) 5 mg tablet; Take 1 tablet (5 mg total) by mouth daily  -     metoprolol succinate (TOPROL-XL) 25 mg 24 hr tablet; Take 1 tablet (25 mg total) by mouth daily at bedtime  -     Comprehensive metabolic panel; Future  -     CBC and differential; Future  -     TSH, 3rd generation with Free T4 reflex; Future  -     Magnesium; Future  -     Uric acid; Future  -     UA/M w/rflx Culture, Comp  -     Comprehensive metabolic panel  -     CBC and differential  -     TSH, 3rd generation with Free T4 reflex  -     Magnesium  -     Uric acid  4. Mixed hyperlipidemia  Assessment & Plan:  Patient  is stable with current medication and we discussed a low fat low cholesterol diet. Weight loss also discussed for this will help lower cholesterol also. Recheck lipids in 6 months.   Orders:  -     Lipid Panel with Direct LDL reflex; Future  -     Lipid Panel with Direct LDL reflex  5. Major depressive disorder with single episode, in remission (HCC)  Assessment & Plan:  Patient to continue utilizing medical  therapy as well and counseling sources as applicable for condition. If  suicidal thought or fear of suicide to contact 911 and seek help immediately. Meds reviewed and patient questions answered today   Orders:  -     escitalopram (LEXAPRO) 10 mg tablet; Take 1 tablet (10 mg total) by mouth daily at bedtime  6. Stage 3a chronic kidney disease (HCC)  Assessment & Plan:  Lab Results   Component Value Date    EGFR 67 07/15/2024    EGFR 60 01/06/2024    EGFR 54 07/06/2023    CREATININE 0.84 07/15/2024    CREATININE 0.92 01/06/2024    CREATININE 1.01 07/06/2023   Pt to avoid NSAIDs and any IV dyes. Patient to follow up eoither with nephrology or  with us for  further  monitoring of  renal function.   7. Hyperlipidemia, unspecified hyperlipidemia type  Assessment & Plan:  Patient  is stable with current medication and we discussed a low fat low cholesterol diet. Weight loss also discussed for this will help lower cholesterol also. Recheck lipids in 6 months.   Orders:  -     ezetimibe (ZETIA) 10 mg tablet; Take 1 tablet (10 mg total) by mouth daily       Preventive health issues were discussed with patient, and age appropriate screening tests were ordered as noted in patient's After Visit Summary. Personalized health advice and appropriate referrals for health education or preventive services given if needed, as noted in patient's After Visit Summary.    History of Present Illness     HPI   Patient Care Team:  Shayne Ennis MD as PCP - General (Family Medicine)  MD Shayne Soto MD Darius Desai, MD (Oncology)  Magy Arango PA-C (Vascular Surgery)  Mcaky Mendoza MD (Gastroenterology)  Brayan Ontiveros MD (Thoracic Surgery)    Review of Systems  Medical History Reviewed by provider this encounter:       Annual Wellness Visit Questionnaire   Freda is here for her Subsequent Wellness visit. Last Medicare Wellness visit information reviewed, patient interviewed and updates made to the record today.       Health Risk Assessment:   Patient rates overall health as good. Patient feels that their physical health rating is same. Patient is satisfied with their life. Eyesight was rated as slightly worse. Hearing was rated as same. Patient feels that their emotional and mental health rating is same. Patients states they are never, rarely angry. Patient states they are often unusually tired/fatigued. Pain experienced in the last 7 days has been some. Patient's pain rating has been 4/10. Patient states that she has experienced no weight loss or gain in last 6 months.     Depression Screening:   PHQ-9 Score: 2      Fall Risk Screening:   In the past year, patient has experienced: no history of falling in past year      Urinary Incontinence Screening:   Patient has not leaked urine accidently in the last six months.     Home Safety:  Patient does not have trouble with stairs inside or outside of their home. Patient has working smoke alarms and has working carbon monoxide detector. Home safety hazards include: none.     Nutrition:   Current diet is Regular.     Medications:   Patient is not currently taking any over-the-counter supplements. Patient is not able to manage medications.     Activities of Daily Living (ADLs)/Instrumental Activities of Daily Living (IADLs):   Walk and transfer into and out of bed and chair?: Yes  Dress and groom yourself?: Yes    Bathe or shower yourself?: Yes    Feed yourself? Yes  Do your laundry/housekeeping?: Yes  Manage your money, pay your bills and track your expenses?: Yes  Make your own meals?: Yes    Do your own shopping?: Yes    Previous Hospitalizations:   Any hospitalizations or ED visits within the last 12 months?: No      Advance Care Planning:   Living will: Yes    Advanced directive: Yes      Cognitive Screening:   Provider or family/friend/caregiver concerned regarding cognition?: Yes    PREVENTIVE SCREENINGS      Cardiovascular Screening:    General: Screening Not Indicated and  History Lipid Disorder      Diabetes Screening:     General: Screening Current      Breast Cancer Screening:     General: Screening Current      Cervical Cancer Screening:    General: Screening Not Indicated      Osteoporosis Screening:    General: Screening Not Indicated and History Osteoporosis      Lung Cancer Screening:     General: Screening Not Indicated and History Lung Cancer      Hepatitis C Screening:    General: Screening Current    Screening, Brief Intervention, and Referral to Treatment (SBIRT)    Screening  Typical number of drinks in a day: 0  Typical number of drinks in a week: 0  Interpretation: Low risk drinking behavior.    SDOH Risk Assessment  Social determinants of health (SDOH) risk assesment tool was completed. The tool at a minimum covered housing stability, food insecurity, transportation needs, and utility difficulty. Patient had at risk responses for the following SDOH domains: housing stability and utilities.     Social Determinants of Health     Financial Resource Strain: Low Risk  (7/14/2023)    Overall Financial Resource Strain (CARDIA)    • Difficulty of Paying Living Expenses: Not hard at all   Food Insecurity: No Food Insecurity (7/19/2024)    Hunger Vital Sign    • Worried About Running Out of Food in the Last Year: Never true    • Ran Out of Food in the Last Year: Never true   Transportation Needs: No Transportation Needs (7/19/2024)    PRAPARE - Transportation    • Lack of Transportation (Medical): No    • Lack of Transportation (Non-Medical): No   Housing Stability: High Risk (7/19/2024)    Housing Stability Vital Sign    • Unable to Pay for Housing in the Last Year: Yes    • Number of Times Moved in the Last Year: 1    • Homeless in the Last Year: No   Utilities: At Risk (7/19/2024)    [facility] Utilities    • Threatened with loss of utilities: Yes     No results found.    Objective     /68 (BP Location: Left arm, Patient Position: Sitting, Cuff Size: Standard)   Pulse 74    "Temp 97.5 °F (36.4 °C)   Resp 18   Ht 5' 2\" (1.575 m)   Wt 56.8 kg (125 lb 3.2 oz)   SpO2 94%   BMI 22.90 kg/m²     Physical Exam      "

## 2024-07-19 NOTE — PATIENT INSTRUCTIONS
Medicare Preventive Visit Patient Instructions  Thank you for completing your Welcome to Medicare Visit or Medicare Annual Wellness Visit today. Your next wellness visit will be due in one year (7/20/2025).  The screening/preventive services that you may require over the next 5-10 years are detailed below. Some tests may not apply to you based off risk factors and/or age. Screening tests ordered at today's visit but not completed yet may show as past due. Also, please note that scanned in results may not display below.  Preventive Screenings:  Service Recommendations Previous Testing/Comments   Colorectal Cancer Screening  * Colonoscopy    * Fecal Occult Blood Test (FOBT)/Fecal Immunochemical Test (FIT)  * Fecal DNA/Cologuard Test  * Flexible Sigmoidoscopy Age: 45-75 years old   Colonoscopy: every 10 years (may be performed more frequently if at higher risk)  OR  FOBT/FIT: every 1 year  OR  Cologuard: every 3 years  OR  Sigmoidoscopy: every 5 years  Screening may be recommended earlier than age 45 if at higher risk for colorectal cancer. Also, an individualized decision between you and your healthcare provider will decide whether screening between the ages of 76-85 would be appropriate. Colonoscopy: 05/09/2024  FOBT/FIT: Not on file  Cologuard: Not on file  Sigmoidoscopy: Not on file          Breast Cancer Screening Age: 40+ years old  Frequency: every 1-2 years  Not required if history of left and right mastectomy Mammogram: 12/20/2023    Screening Current   Cervical Cancer Screening Between the ages of 21-29, pap smear recommended once every 3 years.   Between the ages of 30-65, can perform pap smear with HPV co-testing every 5 years.   Recommendations may differ for women with a history of total hysterectomy, cervical cancer, or abnormal pap smears in past. Pap Smear: 12/22/2023    Screening Not Indicated   Hepatitis C Screening Once for adults born between 1945 and 1965  More frequently in patients at high risk  for Hepatitis C Hep C Antibody: 12/09/2021    Screening Current   Diabetes Screening 1-2 times per year if you're at risk for diabetes or have pre-diabetes Fasting glucose: 86 mg/dL (7/15/2024)  A1C: No results in last 5 years (No results in last 5 years)  Screening Current   Cholesterol Screening Once every 5 years if you don't have a lipid disorder. May order more often based on risk factors. Lipid panel: 07/15/2024    Screening Not Indicated  History Lipid Disorder     Other Preventive Screenings Covered by Medicare:  Abdominal Aortic Aneurysm (AAA) Screening: covered once if your at risk. You're considered to be at risk if you have a family history of AAA.  Lung Cancer Screening: covers low dose CT scan once per year if you meet all of the following conditions: (1) Age 55-77; (2) No signs or symptoms of lung cancer; (3) Current smoker or have quit smoking within the last 15 years; (4) You have a tobacco smoking history of at least 20 pack years (packs per day multiplied by number of years you smoked); (5) You get a written order from a healthcare provider.  Glaucoma Screening: covered annually if you're considered high risk: (1) You have diabetes OR (2) Family history of glaucoma OR (3)  aged 50 and older OR (4)  American aged 65 and older  Osteoporosis Screening: covered every 2 years if you meet one of the following conditions: (1) You're estrogen deficient and at risk for osteoporosis based off medical history and other findings; (2) Have a vertebral abnormality; (3) On glucocorticoid therapy for more than 3 months; (4) Have primary hyperparathyroidism; (5) On osteoporosis medications and need to assess response to drug therapy.   Last bone density test (DXA Scan): 12/20/2023.  HIV Screening: covered annually if you're between the age of 15-65. Also covered annually if you are younger than 15 and older than 65 with risk factors for HIV infection. For pregnant patients, it is covered up  to 3 times per pregnancy.    Immunizations:  Immunization Recommendations   Influenza Vaccine Annual influenza vaccination during flu season is recommended for all persons aged >= 6 months who do not have contraindications   Pneumococcal Vaccine   * Pneumococcal conjugate vaccine = PCV13 (Prevnar 13), PCV15 (Vaxneuvance), PCV20 (Prevnar 20)  * Pneumococcal polysaccharide vaccine = PPSV23 (Pneumovax) Adults 19-63 yo with certain risk factors or if 65+ yo  If never received any pneumonia vaccine: recommend Prevnar 20 (PCV20)  Give PCV20 if previously received 1 dose of PCV13 or PPSV23   Hepatitis B Vaccine 3 dose series if at intermediate or high risk (ex: diabetes, end stage renal disease, liver disease)   Respiratory syncytial virus (RSV) Vaccine - COVERED BY MEDICARE PART D  * RSVPreF3 (Arexvy) CDC recommends that adults 60 years of age and older may receive a single dose of RSV vaccine using shared clinical decision-making (SCDM)   Tetanus (Td) Vaccine - COST NOT COVERED BY MEDICARE PART B Following completion of primary series, a booster dose should be given every 10 years to maintain immunity against tetanus. Td may also be given as tetanus wound prophylaxis.   Tdap Vaccine - COST NOT COVERED BY MEDICARE PART B Recommended at least once for all adults. For pregnant patients, recommended with each pregnancy.   Shingles Vaccine (Shingrix) - COST NOT COVERED BY MEDICARE PART B  2 shot series recommended in those 19 years and older who have or will have weakened immune systems or those 50 years and older     Health Maintenance Due:      Topic Date Due   • Breast Cancer Screening: Mammogram  12/20/2024   • Hepatitis C Screening  Completed   • Colorectal Cancer Screening  Discontinued     Immunizations Due:      Topic Date Due   • Influenza Vaccine (1) 09/01/2024     Advance Directives   What are advance directives?  Advance directives are legal documents that state your wishes and plans for medical care. These plans  are made ahead of time in case you lose your ability to make decisions for yourself. Advance directives can apply to any medical decision, such as the treatments you want, and if you want to donate organs.   What are the types of advance directives?  There are many types of advance directives, and each state has rules about how to use them. You may choose a combination of any of the following:  Living will:  This is a written record of the treatment you want. You can also choose which treatments you do not want, which to limit, and which to stop at a certain time. This includes surgery, medicine, IV fluid, and tube feedings.   Durable power of  for healthcare (DPAHC):  This is a written record that states who you want to make healthcare choices for you when you are unable to make them for yourself. This person, called a proxy, is usually a family member or a friend. You may choose more than 1 proxy.  Do not resuscitate (DNR) order:  A DNR order is used in case your heart stops beating or you stop breathing. It is a request not to have certain forms of treatment, such as CPR. A DNR order may be included in other types of advance directives.  Medical directive:  This covers the care that you want if you are in a coma, near death, or unable to make decisions for yourself. You can list the treatments you want for each condition. Treatment may include pain medicine, surgery, blood transfusions, dialysis, IV or tube feedings, and a ventilator (breathing machine).  Values history:  This document has questions about your views, beliefs, and how you feel and think about life. This information can help others choose the care that you would choose.  Why are advance directives important?  An advance directive helps you control your care. Although spoken wishes may be used, it is better to have your wishes written down. Spoken wishes can be misunderstood, or not followed. Treatments may be given even if you do not want  them. An advance directive may make it easier for your family to make difficult choices about your care.       © Copyright [employer] 2018 Information is for End User's use only and may not be sold, redistributed or otherwise used for commercial purposes. All illustrations and images included in CareNotes® are the copyrighted property of [employer] Inc. or [employer]

## 2024-09-14 ENCOUNTER — APPOINTMENT (OUTPATIENT)
Dept: LAB | Facility: MEDICAL CENTER | Age: 79
End: 2024-09-14
Payer: COMMERCIAL

## 2024-09-14 DIAGNOSIS — I10 ESSENTIAL HYPERTENSION, MALIGNANT: Primary | ICD-10-CM

## 2024-09-14 LAB
ALBUMIN SERPL BCG-MCNC: 4 G/DL (ref 3.5–5)
ALP SERPL-CCNC: 46 U/L (ref 34–104)
ALT SERPL W P-5'-P-CCNC: 15 U/L (ref 7–52)
ANION GAP SERPL CALCULATED.3IONS-SCNC: 6 MMOL/L (ref 4–13)
AST SERPL W P-5'-P-CCNC: 20 U/L (ref 13–39)
BACTERIA UR QL AUTO: ABNORMAL /HPF
BASOPHILS # BLD AUTO: 0.04 THOUSANDS/ÂΜL (ref 0–0.1)
BASOPHILS NFR BLD AUTO: 1 % (ref 0–1)
BILIRUB SERPL-MCNC: 0.62 MG/DL (ref 0.2–1)
BILIRUB UR QL STRIP: NEGATIVE
BUN SERPL-MCNC: 25 MG/DL (ref 5–25)
CALCIUM SERPL-MCNC: 9.6 MG/DL (ref 8.4–10.2)
CHLORIDE SERPL-SCNC: 103 MMOL/L (ref 96–108)
CHOLEST SERPL-MCNC: 181 MG/DL
CLARITY UR: CLEAR
CO2 SERPL-SCNC: 29 MMOL/L (ref 21–32)
COLOR UR: ABNORMAL
CREAT SERPL-MCNC: 0.91 MG/DL (ref 0.6–1.3)
EOSINOPHIL # BLD AUTO: 0.16 THOUSAND/ÂΜL (ref 0–0.61)
EOSINOPHIL NFR BLD AUTO: 2 % (ref 0–6)
ERYTHROCYTE [DISTWIDTH] IN BLOOD BY AUTOMATED COUNT: 12.3 % (ref 11.6–15.1)
GFR SERPL CREATININE-BSD FRML MDRD: 61 ML/MIN/1.73SQ M
GLUCOSE P FAST SERPL-MCNC: 86 MG/DL (ref 65–99)
GLUCOSE UR STRIP-MCNC: NEGATIVE MG/DL
HCT VFR BLD AUTO: 39.3 % (ref 34.8–46.1)
HDLC SERPL-MCNC: 39 MG/DL
HGB BLD-MCNC: 12.9 G/DL (ref 11.5–15.4)
HGB UR QL STRIP.AUTO: ABNORMAL
IMM GRANULOCYTES # BLD AUTO: 0.01 THOUSAND/UL (ref 0–0.2)
IMM GRANULOCYTES NFR BLD AUTO: 0 % (ref 0–2)
KETONES UR STRIP-MCNC: NEGATIVE MG/DL
LDLC SERPL CALC-MCNC: 118 MG/DL (ref 0–100)
LEUKOCYTE ESTERASE UR QL STRIP: NEGATIVE
LYMPHOCYTES # BLD AUTO: 2.71 THOUSANDS/ÂΜL (ref 0.6–4.47)
LYMPHOCYTES NFR BLD AUTO: 39 % (ref 14–44)
MAGNESIUM SERPL-MCNC: 2 MG/DL (ref 1.9–2.7)
MCH RBC QN AUTO: 32.2 PG (ref 26.8–34.3)
MCHC RBC AUTO-ENTMCNC: 32.8 G/DL (ref 31.4–37.4)
MCV RBC AUTO: 98 FL (ref 82–98)
MONOCYTES # BLD AUTO: 0.69 THOUSAND/ÂΜL (ref 0.17–1.22)
MONOCYTES NFR BLD AUTO: 10 % (ref 4–12)
MUCOUS THREADS UR QL AUTO: ABNORMAL
NEUTROPHILS # BLD AUTO: 3.3 THOUSANDS/ÂΜL (ref 1.85–7.62)
NEUTS SEG NFR BLD AUTO: 48 % (ref 43–75)
NITRITE UR QL STRIP: NEGATIVE
NON-SQ EPI CELLS URNS QL MICRO: ABNORMAL /HPF
NRBC BLD AUTO-RTO: 0 /100 WBCS
PH UR STRIP.AUTO: 7.5 [PH]
PLATELET # BLD AUTO: 250 THOUSANDS/UL (ref 149–390)
PMV BLD AUTO: 9.5 FL (ref 8.9–12.7)
POTASSIUM SERPL-SCNC: 4.8 MMOL/L (ref 3.5–5.3)
PROT SERPL-MCNC: 6.8 G/DL (ref 6.4–8.4)
PROT UR STRIP-MCNC: ABNORMAL MG/DL
RBC # BLD AUTO: 4.01 MILLION/UL (ref 3.81–5.12)
RBC #/AREA URNS AUTO: ABNORMAL /HPF
SODIUM SERPL-SCNC: 138 MMOL/L (ref 135–147)
SP GR UR STRIP.AUTO: 1.02 (ref 1–1.03)
T4 FREE SERPL-MCNC: 0.89 NG/DL (ref 0.61–1.12)
TRIGL SERPL-MCNC: 120 MG/DL
TSH SERPL DL<=0.05 MIU/L-ACNC: 4.55 UIU/ML (ref 0.45–4.5)
URATE SERPL-MCNC: 3.8 MG/DL (ref 2–7.5)
UROBILINOGEN UR STRIP-ACNC: <2 MG/DL
WBC # BLD AUTO: 6.91 THOUSAND/UL (ref 4.31–10.16)
WBC #/AREA URNS AUTO: ABNORMAL /HPF

## 2024-09-14 PROCEDURE — 81001 URINALYSIS AUTO W/SCOPE: CPT

## 2024-09-18 ENCOUNTER — OFFICE VISIT (OUTPATIENT)
Dept: FAMILY MEDICINE CLINIC | Facility: CLINIC | Age: 79
End: 2024-09-18
Payer: COMMERCIAL

## 2024-09-18 VITALS
WEIGHT: 124 LBS | BODY MASS INDEX: 22.82 KG/M2 | HEIGHT: 62 IN | HEART RATE: 73 BPM | OXYGEN SATURATION: 99 % | TEMPERATURE: 97.3 F | RESPIRATION RATE: 16 BRPM | SYSTOLIC BLOOD PRESSURE: 140 MMHG | DIASTOLIC BLOOD PRESSURE: 62 MMHG

## 2024-09-18 DIAGNOSIS — F32.5 MAJOR DEPRESSIVE DISORDER WITH SINGLE EPISODE, IN REMISSION (HCC): ICD-10-CM

## 2024-09-18 DIAGNOSIS — E03.8 OTHER SPECIFIED HYPOTHYROIDISM: Primary | ICD-10-CM

## 2024-09-18 DIAGNOSIS — Z23 ENCOUNTER FOR IMMUNIZATION: ICD-10-CM

## 2024-09-18 PROCEDURE — 99213 OFFICE O/P EST LOW 20 MIN: CPT | Performed by: NURSE PRACTITIONER

## 2024-09-18 PROCEDURE — 90662 IIV NO PRSV INCREASED AG IM: CPT | Performed by: NURSE PRACTITIONER

## 2024-09-18 PROCEDURE — G0008 ADMIN INFLUENZA VIRUS VAC: HCPCS | Performed by: NURSE PRACTITIONER

## 2024-09-18 RX ORDER — ESCITALOPRAM OXALATE 20 MG/1
20 TABLET ORAL
Qty: 90 TABLET | Refills: 1 | Status: SHIPPED | OUTPATIENT
Start: 2024-09-18

## 2024-09-19 NOTE — PROGRESS NOTES
Assessment/Plan:      Diagnoses and all orders for this visit:    Other specified hypothyroidism  -     TSH + Free T4; Future    Major depressive disorder with single episode, in remission (HCC)  -     escitalopram (LEXAPRO) 20 mg tablet; Take 1 tablet (20 mg total) by mouth daily at bedtime    Encounter for immunization  -     influenza vaccine, high-dose, PF 0.5 mL (Fluzone High Dose)        Patient being seen today at the request of .   feels patient is distracted not remembering things would like to have her cognitive level tested.  Upon discussion with patient patient states extremely stressed family matters Friend matters.  Also  has been quite ill recently very stressed out hard to focus.  Somewhat possible increase in some depression with no thoughts of harming herself did advise we will increase her Lexapro to 20 mg daily and follow-up on an as-needed basis.  Patient also had lower than level TSH score will repeat that and treat accordingly.  Also flu immunization was given well-tolerated.  Return to office as indicated or as needed.  Subjective:     Patient ID: Freda Canas is a 78 y.o. female.    Patient being seen today for possible Mini-Mental status testing patient unaware that she has been seen for this today.  Will discuss with her.        Review of Systems   Psychiatric/Behavioral:  Positive for decreased concentration. The patient is nervous/anxious.          Objective:     Physical Exam  Vitals and nursing note reviewed.   Constitutional:       General: She is not in acute distress.     Appearance: She is well-developed. She is not diaphoretic.   HENT:      Head: Normocephalic and atraumatic.      Mouth/Throat:      Mouth: Oropharynx is clear and moist.   Eyes:      Extraocular Movements: EOM normal.      Pupils: Pupils are equal, round, and reactive to light.   Cardiovascular:      Rate and Rhythm: Normal rate and regular rhythm.      Heart sounds: Normal heart sounds.    Pulmonary:      Effort: Pulmonary effort is normal.      Breath sounds: Normal breath sounds.   Abdominal:      Palpations: Abdomen is soft.   Musculoskeletal:      Cervical back: Neck supple.   Skin:     General: Skin is dry.   Neurological:      Mental Status: She is alert and oriented to person, place, and time.   Psychiatric:         Mood and Affect: Mood and affect normal.         Behavior: Behavior normal.         Thought Content: Thought content normal.

## 2024-09-29 DIAGNOSIS — E78.5 HYPERLIPIDEMIA, UNSPECIFIED HYPERLIPIDEMIA TYPE: ICD-10-CM

## 2024-09-29 DIAGNOSIS — I10 ESSENTIAL HYPERTENSION: ICD-10-CM

## 2024-09-29 RX ORDER — METOPROLOL SUCCINATE 25 MG/1
25 TABLET, EXTENDED RELEASE ORAL
Qty: 90 TABLET | Refills: 1 | Status: SHIPPED | OUTPATIENT
Start: 2024-09-29

## 2024-09-29 RX ORDER — EZETIMIBE 10 MG/1
10 TABLET ORAL DAILY
Qty: 90 TABLET | Refills: 1 | Status: SHIPPED | OUTPATIENT
Start: 2024-09-29

## 2024-10-07 ENCOUNTER — OFFICE VISIT (OUTPATIENT)
Dept: URGENT CARE | Facility: MEDICAL CENTER | Age: 79
End: 2024-10-07
Payer: COMMERCIAL

## 2024-10-07 ENCOUNTER — APPOINTMENT (EMERGENCY)
Dept: RADIOLOGY | Facility: HOSPITAL | Age: 79
End: 2024-10-07
Payer: COMMERCIAL

## 2024-10-07 ENCOUNTER — HOSPITAL ENCOUNTER (EMERGENCY)
Facility: HOSPITAL | Age: 79
Discharge: HOME | End: 2024-10-07
Attending: EMERGENCY MEDICINE
Payer: COMMERCIAL

## 2024-10-07 VITALS
TEMPERATURE: 97.7 F | SYSTOLIC BLOOD PRESSURE: 186 MMHG | RESPIRATION RATE: 18 BRPM | HEART RATE: 69 BPM | DIASTOLIC BLOOD PRESSURE: 81 MMHG | OXYGEN SATURATION: 98 %

## 2024-10-07 VITALS
WEIGHT: 124 LBS | DIASTOLIC BLOOD PRESSURE: 78 MMHG | HEART RATE: 86 BPM | TEMPERATURE: 97.6 F | OXYGEN SATURATION: 100 % | SYSTOLIC BLOOD PRESSURE: 180 MMHG | RESPIRATION RATE: 18 BRPM | BODY MASS INDEX: 22.68 KG/M2

## 2024-10-07 DIAGNOSIS — R42 DIZZINESS: Primary | ICD-10-CM

## 2024-10-07 DIAGNOSIS — R03.0 ELEVATED BLOOD PRESSURE READING: ICD-10-CM

## 2024-10-07 DIAGNOSIS — R68.83 CHILLS: ICD-10-CM

## 2024-10-07 LAB
ALBUMIN SERPL BCG-MCNC: 3.8 G/DL (ref 3.5–5)
ALP SERPL-CCNC: 45 U/L (ref 34–104)
ALT SERPL W P-5'-P-CCNC: 12 U/L (ref 7–52)
ANION GAP SERPL CALCULATED.3IONS-SCNC: 8 MMOL/L (ref 4–13)
AST SERPL W P-5'-P-CCNC: 18 U/L (ref 13–39)
ATRIAL RATE: 69 BPM
BASOPHILS # BLD AUTO: 0.03 THOUSANDS/ΜL (ref 0–0.1)
BASOPHILS NFR BLD AUTO: 0 % (ref 0–1)
BILIRUB SERPL-MCNC: 0.46 MG/DL (ref 0.2–1)
BUN SERPL-MCNC: 22 MG/DL (ref 5–25)
CALCIUM SERPL-MCNC: 9.1 MG/DL (ref 8.4–10.2)
CHLORIDE SERPL-SCNC: 105 MMOL/L (ref 96–108)
CO2 SERPL-SCNC: 23 MMOL/L (ref 21–32)
CREAT SERPL-MCNC: 0.88 MG/DL (ref 0.6–1.3)
EOSINOPHIL # BLD AUTO: 0.05 THOUSAND/ΜL (ref 0–0.61)
EOSINOPHIL NFR BLD AUTO: 1 % (ref 0–6)
ERYTHROCYTE [DISTWIDTH] IN BLOOD BY AUTOMATED COUNT: 12.3 % (ref 11.6–15.1)
GFR SERPL CREATININE-BSD FRML MDRD: 64 ML/MIN/1.73SQ M
GLUCOSE SERPL-MCNC: 102 MG/DL (ref 65–140)
GLUCOSE SERPL-MCNC: 91 MG/DL (ref 65–140)
HCT VFR BLD AUTO: 36.1 % (ref 34.8–46.1)
HGB BLD-MCNC: 12.2 G/DL (ref 11.5–15.4)
IMM GRANULOCYTES # BLD AUTO: 0.04 THOUSAND/UL (ref 0–0.2)
IMM GRANULOCYTES NFR BLD AUTO: 1 % (ref 0–2)
LYMPHOCYTES # BLD AUTO: 2.52 THOUSANDS/ΜL (ref 0.6–4.47)
LYMPHOCYTES NFR BLD AUTO: 30 % (ref 14–44)
MCH RBC QN AUTO: 32.5 PG (ref 26.8–34.3)
MCHC RBC AUTO-ENTMCNC: 33.8 G/DL (ref 31.4–37.4)
MCV RBC AUTO: 96 FL (ref 82–98)
MONOCYTES # BLD AUTO: 0.7 THOUSAND/ΜL (ref 0.17–1.22)
MONOCYTES NFR BLD AUTO: 8 % (ref 4–12)
NEUTROPHILS # BLD AUTO: 5.09 THOUSANDS/ΜL (ref 1.85–7.62)
NEUTS SEG NFR BLD AUTO: 60 % (ref 43–75)
NRBC BLD AUTO-RTO: 0 /100 WBCS
P AXIS: 61 DEGREES
PLATELET # BLD AUTO: 203 THOUSANDS/UL (ref 149–390)
PMV BLD AUTO: 8.9 FL (ref 8.9–12.7)
POTASSIUM SERPL-SCNC: 3.8 MMOL/L (ref 3.5–5.3)
PR INTERVAL: 152 MS
PROT SERPL-MCNC: 6.7 G/DL (ref 6.4–8.4)
QRS AXIS: 34 DEGREES
QRSD INTERVAL: 84 MS
QT INTERVAL: 428 MS
QTC INTERVAL: 458 MS
RBC # BLD AUTO: 3.75 MILLION/UL (ref 3.81–5.12)
SODIUM SERPL-SCNC: 136 MMOL/L (ref 135–147)
T WAVE AXIS: 56 DEGREES
VENTRICULAR RATE: 69 BPM
WBC # BLD AUTO: 8.43 THOUSAND/UL (ref 4.31–10.16)

## 2024-10-07 PROCEDURE — 99213 OFFICE O/P EST LOW 20 MIN: CPT

## 2024-10-07 PROCEDURE — 93005 ELECTROCARDIOGRAM TRACING: CPT

## 2024-10-07 PROCEDURE — 85025 COMPLETE CBC W/AUTO DIFF WBC: CPT

## 2024-10-07 PROCEDURE — 99285 EMERGENCY DEPT VISIT HI MDM: CPT | Performed by: EMERGENCY MEDICINE

## 2024-10-07 PROCEDURE — 93010 ELECTROCARDIOGRAM REPORT: CPT | Performed by: STUDENT IN AN ORGANIZED HEALTH CARE EDUCATION/TRAINING PROGRAM

## 2024-10-07 PROCEDURE — 71045 X-RAY EXAM CHEST 1 VIEW: CPT

## 2024-10-07 PROCEDURE — 96360 HYDRATION IV INFUSION INIT: CPT

## 2024-10-07 PROCEDURE — 36415 COLL VENOUS BLD VENIPUNCTURE: CPT

## 2024-10-07 PROCEDURE — 82948 REAGENT STRIP/BLOOD GLUCOSE: CPT

## 2024-10-07 PROCEDURE — 99284 EMERGENCY DEPT VISIT MOD MDM: CPT

## 2024-10-07 PROCEDURE — 80053 COMPREHEN METABOLIC PANEL: CPT

## 2024-10-07 RX ADMIN — SODIUM CHLORIDE 500 ML: 0.9 INJECTION, SOLUTION INTRAVENOUS at 17:15

## 2024-10-07 NOTE — PROGRESS NOTES
[facility]        NAME: Freda Canas is a 78 y.o. female  : 1945    MRN: [id number]  DATE: 2024  TIME: 3:18 PM      Assessment and Plan     Dizziness [R42]  1. Dizziness  ECG 12 lead    Transfer to other facility      2. Chills  Transfer to other facility      Poct glucose within normal limits- 102 mg/dl.    EKG rate 69 bpm, NSR, no acute ST abnormalities noted.   Recommended the ER for further evaluation- requesting to be tested for covid first.   Rapid covid negative.     Agreeable to go to ER by EMS. Activated at 1502. Bedside report given to EMS at 1517 stable at time of leaving care now.   Patient Instructions     Proceed to the ER for further evaluation.       Chief Complaint     Chief Complaint   Patient presents with    Dizziness     Pt. C/O dizziness, and feeling cold/chills that began this am. Denies cold symptoms.          History of Present Illness     Patient is a 78-year-old female who presents with family at bedside.  Reports dizziness that started at the gym this afternoon around lunch time- had to have to get a ride home.  Reports unsteady on her feet.  Reports chills worsen normal.  Denies cough. Denies headache. Denies fever.  Denies known sick contacts.  Patient requesting to be tested for COVID. Denies chest pain, shortness of breath, or numbness. Reports urinary frequency that she thinks is secondary to medications she takes. Denies dysuria.     Dizziness  Pertinent negatives include no chills, coughing, fever, nausea, numbness, vomiting or weakness.       Review of Systems     Review of Systems   Constitutional:  Negative for chills and fever.   Respiratory:  Negative for cough.    Gastrointestinal:  Negative for diarrhea, nausea and vomiting.   Neurological:  Positive for dizziness. Negative for syncope, weakness and numbness.   All other systems reviewed and are negative.        Current Medications       Current Outpatient Medications:     amLODIPine  (NORVASC) 5 mg tablet, Take 1 tablet (5 mg total) by mouth daily, Disp: , Rfl:     benazepril (LOTENSIN) 40 MG tablet, Take 1 tablet (40 mg total) by mouth daily, Disp: , Rfl:     Biotin 5 MG TABS, Take by mouth, Disp: , Rfl:     calcium-vitamin D 250-100 MG-UNIT per tablet, Take 1 tablet by mouth 2 (two) times a day, Disp: , Rfl:     Coenzyme Q10 (COQ-10) 100 MG CAPS, Take 1 capsule by mouth daily, Disp: , Rfl:     escitalopram (LEXAPRO) 20 mg tablet, Take 1 tablet (20 mg total) by mouth daily at bedtime, Disp: 90 tablet, Rfl: 1    ezetimibe (ZETIA) 10 mg tablet, TAKE ONE TABLET BY MOUTH ONCE DAILY, Disp: 90 tablet, Rfl: 1    Magnesium Oxide -Mg Supplement 400 MG CAPS, Take 1 capsule by mouth daily, Disp: , Rfl:     metoprolol succinate (TOPROL-XL) 25 mg 24 hr tablet, TAKE ONE TABLET BY MOUTH AT BEDTIME, Disp: 90 tablet, Rfl: 1    Omega-3 Fatty Acids (Omega-3 Fish Oil) 500 MG CAPS, Take by mouth, Disp: , Rfl:     Plant Sterols and Stanols (CHOLESTOFF PO), Take by mouth, Disp: , Rfl:     Red Yeast Rice 600 MG CAPS, Take 2 capsules by mouth daily, Disp: , Rfl:     betamethasone, augmented, (DIPROLENE-AF) 0.05 % cream, Apply topically 2 (two) times a day (Patient not taking: Reported on 10/7/2024), Disp: 30 g, Rfl: 1    ciclopirox (LOPROX) 0.77 % cream, , Disp: , Rfl:     Current Facility-Administered Medications:     cyanocobalamin injection 1,000 mcg, 1,000 mcg, Intramuscular, Q30 Days, DONALD Velásquez, 1,000 mcg at 06/12/23 1312    cyanocobalamin injection 1,000 mcg, 1,000 mcg, Intramuscular, Q30 Days, Shayne Ennis MD, 1,000 mcg at 10/19/23 0928    Current Allergies     Allergies as of 10/07/2024 - Reviewed 10/07/2024   Allergen Reaction Noted    Imdur [isosorbide nitrate] Headache 09/05/2018    Sulfa antibiotics Hives 04/19/2017    Ciprofloxacin Rash and GI Intolerance 02/15/2019    Macrobid [nitrofurantoin] Other (See Comments) 04/04/2019              The following portions of the patient's history were  reviewed and updated as appropriate: allergies, current medications, past family history, past medical history, past social history, past surgical history and problem list.     Past Medical History:   Diagnosis Date    Anxiety     Colon polyps     Essential hypertension 08/08/2018    Gallbladder polyp     Hyperlipidemia     Hypertension     Kidney cysts     Kidney stones     Lung disease        Past Surgical History:   Procedure Laterality Date    BRONCHOSCOPY N/A 3/12/2024    Procedure: NAVIGATIONAL BRONCHOSCOPY;  Surgeon: Brayan Ontiveros MD;  Location: [facility];  Service: Thoracic    COLONOSCOPY      COLONOSCOPY W/ POLYPECTOMY      ENDOBRONCHIAL ULTRASOUND (EBUS) N/A 3/12/2024    Procedure: ENDOBRONCHIAL ULTRASOUND (EBUS) with biopsy;  Surgeon: Brayan Ontiveros MD;  Location: [facility];  Service: Thoracic    HEMORRHOID SURGERY  1971    [facility] INCL FLUOR GDNCE DX W/CELL WASHG SPX N/A 3/12/2024    Procedure: FLEXIBLE BRONCHOSCOPY;  Surgeon: Brayan Ontiveros MD;  Location: [facility];  Service: Thoracic    PA EDG US EXAM SURGICAL ALTER STOM DUODENUM/JEJUNUM N/A 03/14/2019    Procedure: LINEAR ENDOSCOPIC U/S;  Surgeon: Roscoe Malgaon MD;  Location: [facility];  Service: Gastroenterology       Family History   Problem Relation Age of Onset    Hyperlipidemia Mother     Heart attack Father     Other Sister         open heart surgery     Heart murmur Sister     Stroke Sister     Hyperlipidemia Sister         all siblings     Breast cancer Sister 70    Parkinsonism Sister     Cancer Sister     Heart attack Brother         open heart surgery     Deep vein thrombosis Brother     Hyperlipidemia Brother     Coronary artery disease Brother         stents placed     Heart attack Paternal Aunt     Heart attack Paternal Uncle     Hypertension Family         all in the family both sides    No Known Problems Daughter     No Known Problems Maternal Grandmother     No Known Problems Maternal Grandfather     No Known Problems  Paternal Grandmother     No Known Problems Paternal Grandfather     No Known Problems Sister     No Known Problems Brother     No Known Problems Son     No Known Problems Son     Anuerysm Neg Hx     Heart failure Neg Hx          Medications have been verified.        Objective     BP (!) 180/78   Pulse 86   Temp 97.6 °F (36.4 °C)   Resp 18   Wt 56.2 kg (124 lb)   SpO2 100%   BMI 22.68 kg/m²   No LMP recorded. Patient is postmenopausal.         Physical Exam     Physical Exam  Vitals and nursing note reviewed.   Constitutional:       General: She is awake. She is not in acute distress.     Appearance: Normal appearance. She is not ill-appearing, toxic-appearing or diaphoretic.   Cardiovascular:      Rate and Rhythm: Normal rate.      Pulses: Normal pulses.      Heart sounds: Normal heart sounds, S1 normal and S2 normal.   Pulmonary:      Effort: Pulmonary effort is normal.      Breath sounds: Normal breath sounds and air entry.   Skin:     General: Skin is warm.      Capillary Refill: Capillary refill takes less than 2 seconds.   Neurological:      General: No focal deficit present.      Mental Status: She is alert.      GCS: GCS eye subscore is 4. GCS verbal subscore is 5. GCS motor subscore is 6.      Cranial Nerves: No cranial nerve deficit, dysarthria or facial asymmetry.      Sensory: Sensation is intact.      Gait: Gait abnormal (unsteady, ambulates with assistance).      Comments: Speech clear.    Psychiatric:         Mood and Affect: Mood normal.         Behavior: Behavior normal.         Thought Content: Thought content normal.         Judgment: Judgment normal.

## 2024-10-07 NOTE — ED NOTES
Patient ambulatory to the bathroom with standby assist, gait steady     Peyton Olson RN  10/07/24 2461

## 2024-10-07 NOTE — DISCHARGE INSTRUCTIONS
You were evaluated in the emergency department for: dizziness. You can access your current and pending results through [employer].    - You should follow-up with your primary care provider, as soon as possible, for re-evaluation.    Please, return to the emergency department if you experience new or worsening symptoms, fever, chest pain, shortness of breath, difficulty breathing, dizziness, abdominal pain, persistent nausea/vomiting, syncope or passing out, blood in your urine or stool, coughing up blood, leg swelling/pain, urinary retention, bowel or bladder incontinence, numbness between your legs.    - diagnosis; dizziness/ elevated blood pressure in the er  180/80    - activity as tolerated- no fast movements/position changes -- please call your primary doctor tomorrow to schedule an  appointment for a recheck within 1 week- tell them that you came in fro dizziness- had a normal labs/ ecg/ and physical  exam- and your blood pressure was elevated in the er- again the er is not the best place to check your blood pressure- need to have your blood pressure checked over several different times when you are calm relaxed and in no pain -- please continue to take your blood pressure medications as before- please do not worry about your blood pressure in the short term - it just needs to be rechecked     - please return to  the er for any passing out or any new problems with vision talking/swallowing/balance/walking

## 2024-10-07 NOTE — ED PROVIDER NOTES
Final diagnoses:   Dizziness   Elevated blood pressure reading     ED Disposition       ED Disposition   Discharge    Condition   Stable    Date/Time   Mon Oct 7, 2024  5:57 PM    Comment   Freda Canas discharge to home/self care.                   Assessment & Plan       Medical Decision Making  Amount and/or Complexity of Data Reviewed  Labs: ordered. Decision-making details documented in ED Course.  Radiology: ordered. Decision-making details documented in ED Course.      Freda Canas is a 78 y.o. female presenting with dizziness that resolved prior to presentation. Associated symptoms: none, no chest pain, shortness of breath, nausea, vomiting, palpitations, focal weakness, numbness or tingling. Chronic chilly extremities. Vitals unremarkable except for elevated blood pressure. Exam unremarkable, no focal neurologic deficits, cerebellar exams normal, able to stand and ambulate without dizziness or ataxia.    DDx including but not limited to: metabolic abnormality, cardiac abnormality, thyroid disease, intracranial process, adverse reaction; doubt infectious process.    Plan:  - Will get ECG to evaluate for arrhythmia and signs of ischemia and other cause of symptoms  - Will examine CBC to evaluate for hematologic abnormalities and infection  - Will examine CMP to evaluate for metabolic abnormalities  - Will get CXR to evaluate for cardiopulmonary causes  - As she has no focal deficits on exam, no current dizziness, and is able to ambulate without assistance, do not think CT imaging would be useful to her at this time.    Will continue to monitor while patient is in the ED and reconsider further evaluation or intervention as needed.    See ED course for further updates and interpretation of results.    Based on these results and H&P, suspect vasovagal vs orthostatic element to dizziness though difficult to determine as patient is asymptomatic in the ED except for chronic cold extremities. Vitals were  "stable during time in the ED, she remained without dizziness and other new symptoms, and was able to ambulate and tolerate PO intake. As such she is stable for discharge.    Results, clinical impressions, and plan were discussed with patient and family. They expressed understanding and were in agreement with plan. Patient was given the opportunity to ask questions in ED. All questions and concerns were addressed in ED.    After evaluation and workup in the emergency department Patient appears well, is nontoxic appearing, expresses understanding and agrees with plan of care at this time.  In light of this patient would benefit from outpatient management. Discussed strict return precautions.  Discussed importance of following up with PCP in the next few days.  All questions answered.  Patient is agreeable to discharge.    ED Course as of 10/08/24 2115   Mon Oct 07, 2024   1637 Plan:  - Will get ECG to evaluate for arrhythmia and signs of ischemia and other cause of symptoms  - Will examine CBC to evaluate for hematologic abnormalities and infection  - Will examine CMP to evaluate for metabolic abnormalities  - Will get CXR to evaluate for cardiopulmonary causes  - Will give IVF    Will continue to monitor while patient is in the ED and reconsider further evaluation or intervention as needed.   1736 CBC and differential(!)  Grossly normal   1740 Comprehensive metabolic panel  Normal   1756 Blood Pressure: 146/69  Improved without internveiton.  She is stable for disharge  Ambulated   1758 XR chest 1 view portable  IMPRESSION:     No acute cardiopulmonary disease.       Medications   sodium chloride 0.9 % bolus 500 mL (0 mL Intravenous Stopped 10/7/24 1815)       ED Risk Strat Scores                                               History of Present Illness       Chief Complaint   Patient presents with    Dizziness     Pt became dizzy at the gym while standing, did not pass out, no LOC. Pt reports just \"had to sit down.\" " No CP/SOB. No prior hx       Past Medical History:   Diagnosis Date    Anxiety     Colon polyps     Essential hypertension 08/08/2018    Gallbladder polyp     Hyperlipidemia     Hypertension     Kidney cysts     Kidney stones     Lung disease       Past Surgical History:   Procedure Laterality Date    BRONCHOSCOPY N/A 3/12/2024    Procedure: NAVIGATIONAL BRONCHOSCOPY;  Surgeon: Brayan Ontiveros MD;  Location: [facility];  Service: Thoracic    COLONOSCOPY      COLONOSCOPY W/ POLYPECTOMY      ENDOBRONCHIAL ULTRASOUND (EBUS) N/A 3/12/2024    Procedure: ENDOBRONCHIAL ULTRASOUND (EBUS) with biopsy;  Surgeon: Brayan Ontiveros MD;  Location: [facility];  Service: Thoracic    HEMORRHOID SURGERY  1971    [facility] INCL FLUOR GDNCE DX W/CELL WASHG SPX N/A 3/12/2024    Procedure: FLEXIBLE BRONCHOSCOPY;  Surgeon: Brayan Ontiveros MD;  Location: [facility];  Service: Thoracic    HI EDG US EXAM SURGICAL ALTER STOM DUODENUM/JEJUNUM N/A 03/14/2019    Procedure: LINEAR ENDOSCOPIC U/S;  Surgeon: Roscoe Malagon MD;  Location: [facility];  Service: Gastroenterology      Family History   Problem Relation Age of Onset    Hyperlipidemia Mother     Heart attack Father     Other Sister         open heart surgery     Heart murmur Sister     Stroke Sister     Hyperlipidemia Sister         all siblings     Breast cancer Sister 70    Parkinsonism Sister     Cancer Sister     Heart attack Brother         open heart surgery     Deep vein thrombosis Brother     Hyperlipidemia Brother     Coronary artery disease Brother         stents placed     Heart attack Paternal Aunt     Heart attack Paternal Uncle     Hypertension Family         all in the family both sides    No Known Problems Daughter     No Known Problems Maternal Grandmother     No Known Problems Maternal Grandfather     No Known Problems Paternal Grandmother     No Known Problems Paternal Grandfather     No Known Problems Sister     No Known Problems Brother     No Known Problems Son      No Known Problems Son     Anuerysm Neg Hx     Heart failure Neg Hx       Social History     Tobacco Use    Smoking status: Never     Passive exposure: Never    Smokeless tobacco: Never   Vaping Use    Vaping status: Never Used   Substance Use Topics    Alcohol use: Yes     Comment: social - rare     Drug use: No      E-Cigarette/Vaping    E-Cigarette Use Never User       E-Cigarette/Vaping Substances    Nicotine No     THC No     CBD No     Flavoring No     Other No     Unknown No       I have reviewed and agree with the history as documented.     HPI    Freda Canas is a 78 y.o. female with history of anxiety, PAD, HTN, HLD, CKD presenting for dizziness.    Patient reports she was at the gym, standing not actively working out when she reports she felt dizzy/lightheaded, was not able to exactly articulate which it was definitively. This was transient, and she sat down and recovered. Went to Urgent care who recommended she present to the ED for further evaluation.     Asymptomatic of this new sensation in the ED. No loss of consciousness, no vision changes, no focal numbness, weakness or dizziness. No chest pain, palpitations, shortness of breath, back pain, abdominal pain, nausea, vomiting. Reports she had been eating and drinking normally. No fevers or chills. No constipation, diarrhea, urinary frequency, dysuria, and other new extremity weakness, swelling and pain. Chronically feeling cold in her extremities. Reports normal sleep and normal appetite. Ambulating well at her baseline. Only recent medication change was Lexapro increase about 2 weeks ago. No other medication changes, no new foods, no recent illnesses.    No active cancer, not bed ridden, no calf swelling, no collateral superficial veins, entire leg is not swollen, no localized tenderness along the deep venous system, no pitting edema confined to the symptomatic leg, no paralysis paresis or recent plaster immobilization, no history  DVT.    Review of Systems   Constitutional:  Negative for chills and fever.   HENT:  Negative for congestion, hearing loss and trouble swallowing.    Eyes:  Negative for pain and visual disturbance.   Respiratory:  Negative for cough and shortness of breath.    Cardiovascular:  Negative for chest pain, palpitations and leg swelling.   Gastrointestinal:  Negative for abdominal pain, constipation, diarrhea, nausea and vomiting.   Genitourinary:  Negative for dysuria, frequency and hematuria.   Musculoskeletal:  Negative for arthralgias and back pain.   Skin:  Negative for color change and rash.   Neurological:  Positive for dizziness (Dizziness/lightheadedness). Negative for tremors, seizures, syncope, facial asymmetry, speech difficulty, weakness, numbness and headaches.   Psychiatric/Behavioral:  Negative for dysphoric mood.    All other systems reviewed and are negative.          Objective       ED Triage Vitals   Temperature Pulse Blood Pressure Respirations SpO2 Patient Position - Orthostatic VS   10/07/24 1555 10/07/24 1556 10/07/24 1556 10/07/24 1555 10/07/24 1556 10/07/24 1556   97.7 °F (36.5 °C) 63 (!) 181/73 16 99 % Lying      Temp Source Heart Rate Source BP Location FiO2 (%) Pain Score    10/07/24 1555 10/07/24 1556 10/07/24 1556 -- 10/07/24 1555    Oral Monitor Right arm  No Pain      Vitals      Date and Time Temp Pulse SpO2 Resp BP Pain Score FACES Pain Rating User   10/07/24 1800 -- 69 98 % 18 186/81 -- -- NR   10/07/24 1715 -- 61 97 % 16 146/69 -- -- NR   10/07/24 1556 -- 63 99 % -- 181/73 -- -- RI   10/07/24 1555 97.7 °F (36.5 °C) -- -- 16 -- No Pain -- RI            Physical Exam  Vitals and nursing note reviewed.   Constitutional:       General: She is not in acute distress.     Appearance: She is well-developed.   HENT:      Head: Normocephalic and atraumatic.      Mouth/Throat:      Mouth: Mucous membranes are moist.      Pharynx: Oropharynx is clear.   Eyes:      General: No visual field  deficit.     Extraocular Movements: Extraocular movements intact.      Conjunctiva/sclera: Conjunctivae normal.   Cardiovascular:      Rate and Rhythm: Normal rate and regular rhythm.      Pulses: Normal pulses.           Radial pulses are 2+ on the right side and 2+ on the left side.        Dorsalis pedis pulses are 2+ on the right side and 2+ on the left side.        Posterior tibial pulses are 2+ on the right side and 2+ on the left side.      Heart sounds: Normal heart sounds.   Pulmonary:      Effort: Pulmonary effort is normal. No respiratory distress.      Breath sounds: Normal breath sounds. No wheezing, rhonchi or rales.   Abdominal:      General: Abdomen is flat.      Palpations: Abdomen is soft.      Tenderness: There is no abdominal tenderness.   Musculoskeletal:         General: No tenderness.      Cervical back: Normal range of motion.      Right lower leg: No edema.      Left lower leg: No edema.   Skin:     General: Skin is warm and dry.      Capillary Refill: Capillary refill takes less than 2 seconds.   Neurological:      General: No focal deficit present.      Mental Status: She is alert and oriented to person, place, and time.      Cranial Nerves: Cranial nerves 2-12 are intact. No cranial nerve deficit, dysarthria or facial asymmetry.      Sensory: Sensation is intact. No sensory deficit.      Motor: Motor function is intact. No weakness, tremor, seizure activity or pronator drift.      Coordination: Coordination is intact. Romberg sign negative. Coordination normal. Finger-Nose-Finger Test and Heel to Shin Test normal. Rapid alternating movements normal.      Gait: Gait is intact. Gait normal.   Psychiatric:         Mood and Affect: Mood normal.         Behavior: Behavior normal.         Results Reviewed       Procedure Component Value Units Date/Time    Comprehensive metabolic panel [[id number]] Collected: 10/07/24 6401    Lab Status: Final result Specimen: Blood from Arm, Left Updated:  10/07/24 1740     Sodium 136 mmol/L      Potassium 3.8 mmol/L      Chloride 105 mmol/L      CO2 23 mmol/L      ANION GAP 8 mmol/L      BUN 22 mg/dL      Creatinine 0.88 mg/dL      Glucose 91 mg/dL      Calcium 9.1 mg/dL      AST 18 U/L      ALT 12 U/L      Alkaline Phosphatase 45 U/L      Total Protein 6.7 g/dL      Albumin 3.8 g/dL      Total Bilirubin 0.46 mg/dL      eGFR 64 ml/min/1.73sq m     Narrative:      National Kid[employer] Foundation guidelines for Chronic Kidney Disease (CKD):     Stage 1 with normal or high GFR (GFR > 90 mL/min/1.73 square meters)    Stage 2 Mild CKD (GFR = 60-89 mL/min/1.73 square meters)    Stage 3A Moderate CKD (GFR = 45-59 mL/min/1.73 square meters)    Stage 3B Moderate CKD (GFR = 30-44 mL/min/1.73 square meters)    Stage 4 Severe CKD (GFR = 15-29 mL/min/1.73 square meters)    Stage 5 End Stage CKD (GFR <15 mL/min/1.73 square meters)  Note: GFR calculation is accurate only with a steady state creatinine    CBC and differential [[id number]]  (Abnormal) Collected: 10/07/24 1715    Lab Status: Final result Specimen: Blood from Arm, Left Updated: 10/07/24 1725     WBC 8.43 Thousand/uL      RBC 3.75 Million/uL      Hemoglobin 12.2 g/dL      Hematocrit 36.1 %      MCV 96 fL      MCH 32.5 pg      MCHC 33.8 g/dL      RDW 12.3 %      MPV 8.9 fL      Platelets 203 Thousands/uL      nRBC 0 /100 WBCs      Segmented % 60 %      Immature Grans % 1 %      Lymphocytes % 30 %      Monocytes % 8 %      Eosinophils Relative 1 %      Basophils Relative 0 %      Absolute Neutrophils 5.09 Thousands/µL      Absolute Immature Grans 0.04 Thousand/uL      Absolute Lymphocytes 2.52 Thousands/µL      Absolute Monocytes 0.70 Thousand/µL      Eosinophils Absolute 0.05 Thousand/µL      Basophils Absolute 0.03 Thousands/µL             XR chest 1 view portable   Final Interpretation by Maciej Leavitt MD (10/07 1753)      No acute cardiopulmonary disease.            Workstation performed: [id number]              ECG 12 Lead Documentation Only    Date/Time: 10/7/2024 4:40 PM    Performed by: Mary Anne Calderón MD  Authorized by: Mary Anne Calderón MD    Indications / Diagnosis:  Dizziness  ECG reviewed by me, the ED Provider: yes    Patient location:  ED  Previous ECG:     Previous ECG:  Compared to current    Comparison ECG info:  10/7/2024    Similarity:  No change    Comparison to cardiac monitor: Yes    Interpretation:     Interpretation: normal    Rate:     ECG rate:  61    ECG rate assessment: normal    Rhythm:     Rhythm: sinus rhythm    Ectopy:     Ectopy: none    QRS:     QRS axis:  Normal    QRS intervals:  Normal  Conduction:     Conduction: normal    ST segments:     ST segments:  Normal  T waves:     T waves: normal    Comments:      QTc 442      ED Medication and Procedure Management   Prior to Admission Medications   Prescriptions Last Dose Informant Patient Reported? Taking?   Biotin 5 MG TABS  Self Yes No   Sig: Take by mouth   Coenzyme Q10 (COQ-10) 100 MG CAPS  Self Yes No   Sig: Take 1 capsule by mouth daily   Magnesium Oxide -Mg Supplement 400 MG CAPS  Self Yes No   Sig: Take 1 capsule by mouth daily   Omega-3 Fatty Acids (Omega-3 Fish Oil) 500 MG CAPS  Self Yes No   Sig: Take by mouth   Plant Sterols and Stanols (CHOLESTOFF PO)  Self Yes No   Sig: Take by mouth   Red Yeast Rice 600 MG CAPS  Self Yes No   Sig: Take 2 capsules by mouth daily   amLODIPine (NORVASC) 5 mg tablet   No No   Sig: Take 1 tablet (5 mg total) by mouth daily   benazepril (LOTENSIN) 40 MG tablet   No No   Sig: Take 1 tablet (40 mg total) by mouth daily   betamethasone, augmented, (DIPROLENE-AF) 0.05 % cream   No No   Sig: Apply topically 2 (two) times a day   Patient not taking: Reported on 10/7/2024   calcium-vitamin D 250-100 MG-UNIT per tablet  Self Yes No   Sig: Take 1 tablet by mouth 2 (two) times a day   ciclopirox (LOPROX) 0.77 % cream  Self Yes No   Patient not taking: Reported on 10/7/2024   escitalopram (LEXAPRO) 20  mg tablet   No No   Sig: Take 1 tablet (20 mg total) by mouth daily at bedtime   ezetimibe (ZETIA) 10 mg tablet   No No   Sig: TAKE ONE TABLET BY MOUTH ONCE DAILY   metoprolol succinate (TOPROL-XL) 25 mg 24 hr tablet   No No   Sig: TAKE ONE TABLET BY MOUTH AT BEDTIME      Facility-Administered Medications Last Administration Doses Remaining   cyanocobalamin injection 1,000 mcg 6/12/2023  1:12 PM    cyanocobalamin injection 1,000 mcg 10/19/2023  9:28 AM         Discharge Medication List as of 10/7/2024  6:38 PM        CONTINUE these medications which have NOT CHANGED    Details   amLODIPine (NORVASC) 5 mg tablet Take 1 tablet (5 mg total) by mouth daily, Starting Fri 7/19/2024, No Print      benazepril (LOTENSIN) 40 MG tablet Take 1 tablet (40 mg total) by mouth daily, Starting Fri 7/19/2024, No Print      betamethasone, augmented, (DIPROLENE-AF) 0.05 % cream Apply topically 2 (two) times a day, Starting Thu 4/25/2024, Normal      Biotin 5 MG TABS Take by mouth, Historical Med      calcium-vitamin D 250-100 MG-UNIT per tablet Take 1 tablet by mouth 2 (two) times a day, Historical Med      ciclopirox (LOPROX) 0.77 % cream Historical Med      Coenzyme Q10 (COQ-10) 100 MG CAPS Take 1 capsule by mouth daily, Historical Med      escitalopram (LEXAPRO) 20 mg tablet Take 1 tablet (20 mg total) by mouth daily at bedtime, Starting Wed 9/18/2024, Normal      ezetimibe (ZETIA) 10 mg tablet TAKE ONE TABLET BY MOUTH ONCE DAILY, Starting Sun 9/29/2024, Normal      Magnesium Oxide -Mg Supplement 400 MG CAPS Take 1 capsule by mouth daily, Historical Med      metoprolol succinate (TOPROL-XL) 25 mg 24 hr tablet TAKE ONE TABLET BY MOUTH AT BEDTIME, Starting Sun 9/29/2024, Normal      Omega-3 Fatty Acids (Omega-3 Fish Oil) 500 MG CAPS Take by mouth, Historical Med      Plant Sterols and Stanols (CHOLESTOFF PO) Take by mouth, Historical Med      Red Yeast Rice 600 MG CAPS Take 2 capsules by mouth daily, Starting Wed 3/12/2008, Historical  Med           No discharge procedures on file.  ED SEPSIS DOCUMENTATION   Time reflects when diagnosis was documented in both MDM as applicable and the Disposition within this note       Time User Action Codes Description Comment    10/7/2024  5:57 PM Mary Anne Calderón Add [R42] Dizziness     10/7/2024  6:32 PM Yanick Chen Add [R03.0] Elevated blood pressure reading                  Mary Anne Calderón MD  10/08/24 4387

## 2024-10-08 LAB
ATRIAL RATE: 61 BPM
ATRIAL RATE: 69 BPM
P AXIS: 61 DEGREES
P AXIS: 70 DEGREES
PR INTERVAL: 148 MS
PR INTERVAL: 152 MS
QRS AXIS: 34 DEGREES
QRS AXIS: 52 DEGREES
QRSD INTERVAL: 84 MS
QRSD INTERVAL: 86 MS
QT INTERVAL: 428 MS
QT INTERVAL: 440 MS
QTC INTERVAL: 442 MS
QTC INTERVAL: 458 MS
T WAVE AXIS: 56 DEGREES
T WAVE AXIS: 59 DEGREES
VENTRICULAR RATE: 61 BPM
VENTRICULAR RATE: 69 BPM

## 2024-10-08 PROCEDURE — 93010 ELECTROCARDIOGRAM REPORT: CPT | Performed by: STUDENT IN AN ORGANIZED HEALTH CARE EDUCATION/TRAINING PROGRAM

## 2024-10-09 ENCOUNTER — OFFICE VISIT (OUTPATIENT)
Dept: FAMILY MEDICINE CLINIC | Facility: CLINIC | Age: 79
End: 2024-10-09
Payer: COMMERCIAL

## 2024-10-09 VITALS
HEART RATE: 68 BPM | BODY MASS INDEX: 23.37 KG/M2 | WEIGHT: 127 LBS | OXYGEN SATURATION: 99 % | HEIGHT: 62 IN | DIASTOLIC BLOOD PRESSURE: 80 MMHG | TEMPERATURE: 97.9 F | RESPIRATION RATE: 18 BRPM | SYSTOLIC BLOOD PRESSURE: 142 MMHG

## 2024-10-09 DIAGNOSIS — R42 DIZZINESS, NONSPECIFIC: Primary | ICD-10-CM

## 2024-10-09 PROCEDURE — 99213 OFFICE O/P EST LOW 20 MIN: CPT | Performed by: NURSE PRACTITIONER

## 2024-10-09 NOTE — PROGRESS NOTES
Assessment/Plan:      Diagnoses and all orders for this visit:    Dizziness, nonspecific        Significant improvement in symptoms since hospitalization.  Vital signs are well within normal limits.  Patient continues to be under stress.  She is tolerating the increased dose of Lexapro no problem.  Dosing all possible side effects of the prescribed medications or medications that had been prescribed in the past were reviewed and all questions were answered.  Patient verbalized agreement and understanding of the plan of care as outlined during the office visit today return to office as indicated or sooner if a problem arises.    Subjective:     Patient ID: Freda Canas is a 78 y.o. female.    ER follow up was in the hospital for dizziness is feeling much better.        Review of Systems   Constitutional:  Negative for appetite change and fever.   HENT:  Negative for sinus pressure and sore throat.    Eyes:  Negative for pain.   Respiratory:  Negative for shortness of breath.    Cardiovascular:  Negative for chest pain.   Gastrointestinal:  Negative for abdominal pain.   Genitourinary:  Negative for dysuria.   Musculoskeletal:  Negative for arthralgias and myalgias.   Skin:  Negative for color change.   Neurological:  Negative for light-headedness.   Psychiatric/Behavioral:  Negative for behavioral problems.          Objective:     Physical Exam  Cardiovascular:      Rate and Rhythm: Normal rate and regular rhythm.      Heart sounds: Normal heart sounds.   Pulmonary:      Effort: Pulmonary effort is normal.      Breath sounds: Normal breath sounds.

## 2024-10-10 NOTE — ED ATTENDING ATTESTATION
10/7/2024  IYanick MD, saw and evaluated the patient. I have discussed the patient with the resident/non-physician practitioner and agree with the resident's/non-physician practitioner's findings, Plan of Care, and MDM as documented in the resident's/non-physician practitioner's note, except where noted. All available labs and Radiology studies were reviewed.  I was present for key portions of any procedure(s) performed by the resident/non-physician practitioner and I was immediately available to provide assistance.       At this point I agree with the current assessment done in the Emergency Department.  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 Near syncope- no definite vertigo sensation -- no preceding headache/neck pain/ cp/sob/palp/abd pain - no diplopia/ dysarthria/ dysphagia/ dysphonia/ dysmetria -- at present  improved- went to urgent care and sent to e r for eval- pt states always feels cold- this is not new- avss- htnsive- pulse ox 99 % on ra- interpretation  is normal- no intervention- in and- rrr s1/s2-cta-b/soft nt/nd- no peritoneal signs - no pulsatile abd mass/bruit/ tenderness- equal bilateral radial/dp pulses- no ble edema/calf tenderness/asym/ erythema- normal non focal neur o exam - normal cn exam-sym plate rise- normal bue/ble strength/sensation / no nystagmus- neg test of sckew- normal finger to nose- heel to shin all bilaterally in er - no truncal ataxia- normal gait in er with er md    1828 Er md note- pt re-evaluated-  by er md- all studies- disposition d/w ptr and  in room- pt concerned that bp is up-- pt is on ccb/bb - pt is very anxious at baseline-- and chronically feels cold- not new- pt and  inforemd that  exam/ neuro exam / labs/ ecg are normal for pt and pt is currently not dizzy--  and that  at present pt is asymptomatic with  no signs or neurologic/ cardiac cause of symptoms- further d/w pt need for followup with pmd as pt does not check bp at home and reasons when to return to  the er - they verbalize understanding are are fine with going home- will d/c   5787 Cxr portable- reviewed by ermd- no acute findings          Critical Care Time  Procedures

## 2024-11-11 DIAGNOSIS — I10 ESSENTIAL HYPERTENSION: ICD-10-CM

## 2024-11-12 RX ORDER — AMLODIPINE BESYLATE 5 MG/1
5 TABLET ORAL DAILY
Qty: 90 TABLET | Refills: 0 | Status: SHIPPED | OUTPATIENT
Start: 2024-11-12

## 2024-11-18 DIAGNOSIS — F32.5 MAJOR DEPRESSIVE DISORDER WITH SINGLE EPISODE, IN REMISSION (HCC): ICD-10-CM

## 2024-11-18 DIAGNOSIS — I10 ESSENTIAL HYPERTENSION: ICD-10-CM

## 2024-11-18 DIAGNOSIS — E78.5 HYPERLIPIDEMIA, UNSPECIFIED HYPERLIPIDEMIA TYPE: ICD-10-CM

## 2024-11-18 NOTE — TELEPHONE ENCOUNTER
Patient calling for refills of her medication    Benazepril 40mg     Ezetimibe 10mg    Metorprolol 25mg 24 hour tablet    Escitalopram 20mg    [facility]    Patient would like these to be 100 day refills and not 90

## 2024-11-20 RX ORDER — BENAZEPRIL HYDROCHLORIDE 40 MG/1
40 TABLET ORAL DAILY
Qty: 100 TABLET | Refills: 1 | Status: SHIPPED | OUTPATIENT
Start: 2024-11-20

## 2024-11-20 RX ORDER — METOPROLOL SUCCINATE 25 MG/1
25 TABLET, EXTENDED RELEASE ORAL
Qty: 100 TABLET | Refills: 1 | Status: SHIPPED | OUTPATIENT
Start: 2024-11-20

## 2024-11-20 RX ORDER — EZETIMIBE 10 MG/1
10 TABLET ORAL DAILY
Qty: 100 TABLET | Refills: 1 | Status: SHIPPED | OUTPATIENT
Start: 2024-11-20

## 2024-11-20 RX ORDER — ESCITALOPRAM OXALATE 20 MG/1
20 TABLET ORAL
Qty: 100 TABLET | Refills: 1 | Status: SHIPPED | OUTPATIENT
Start: 2024-11-20

## 2024-12-03 ENCOUNTER — HOSPITAL ENCOUNTER (OUTPATIENT)
Dept: RADIOLOGY | Facility: MEDICAL CENTER | Age: 79
Discharge: HOME | End: 2024-12-03
Payer: COMMERCIAL

## 2024-12-03 DIAGNOSIS — R91.8 MASS OF UPPER LOBE OF RIGHT LUNG: ICD-10-CM

## 2024-12-03 PROCEDURE — 71250 CT THORAX DX C-: CPT

## 2024-12-12 ENCOUNTER — OFFICE VISIT (OUTPATIENT)
Dept: CARDIAC SURGERY | Facility: CLINIC | Age: 79
End: 2024-12-12
Payer: COMMERCIAL

## 2024-12-12 VITALS
SYSTOLIC BLOOD PRESSURE: 137 MMHG | DIASTOLIC BLOOD PRESSURE: 71 MMHG | BODY MASS INDEX: 23.37 KG/M2 | HEART RATE: 66 BPM | HEIGHT: 62 IN | OXYGEN SATURATION: 99 % | WEIGHT: 127 LBS | RESPIRATION RATE: 15 BRPM | TEMPERATURE: 97.9 F

## 2024-12-12 DIAGNOSIS — R91.8 MASS OF UPPER LOBE OF RIGHT LUNG: Primary | ICD-10-CM

## 2024-12-12 PROCEDURE — 99213 OFFICE O/P EST LOW 20 MIN: CPT

## 2024-12-12 NOTE — ASSESSMENT & PLAN NOTE
Freda Canas is a 78-year-old female who February 2024 had an incidentally found right upper lobe lung mass.  This mass was found to be mildly FDG avid on PET CT scan and negative for malignancy on navigational bronchoscopy.  Her last 3-month interval chest CT image reviewed and a tiny residual amount of inflammation in the area of the previous 3.8 cm right upper lobe mass. Recent 6 month interval CT scan of the chest completed on 12/3/2024 demonstrated resolution of right upper lobe mass with stable residual 4 mm right upper lobe nodule/scar with further progression of groundglass opacity and mild benign residual linear scar.  We discussed that with this continued resolution the original right upper lobe mass was likely most consistent with an infectious process from which patient has now recovered.  Discussed with this continued resolution and patient's lack of risk factors for lung cancer, we will not perform further imaging at this time.  Patient can follow-up with our office as needed.  This diagnosis of mass of upper lobe of right lung can now be resolved from patient's chart.

## 2024-12-12 NOTE — PROGRESS NOTES
Assessment/Plan:    Mass of upper lobe of right lung  Freda Canas is a 78-year-old female who February 2024 had an incidentally found right upper lobe lung mass.  This mass was found to be mildly FDG avid on PET CT scan and negative for malignancy on navigational bronchoscopy.  Her last 3-month interval chest CT image reviewed and a tiny residual amount of inflammation in the area of the previous 3.8 cm right upper lobe mass. Recent 6 month interval CT scan of the chest completed on 12/3/2024 demonstrated resolution of right upper lobe mass with stable residual 4 mm right upper lobe nodule/scar with further progression of groundglass opacity and mild benign residual linear scar.  We discussed that with this continued resolution the original right upper lobe mass was likely most consistent with an infectious process from which patient has now recovered.  Discussed with this continued resolution and patient's lack of risk factors for lung cancer, we will not perform further imaging at this time.  Patient can follow-up with our office as needed.  This diagnosis of mass of upper lobe of right lung can now be resolved from patient's chart.       Diagnoses and all orders for this visit:    Mass of upper lobe of right lung          Thoracic History   Diagnosis: Right upper lobe mass   Procedures/Surgeries: 3/12/24 navigational bronchoscopy, EBUS, flexible bronchoscopy   Pathology: 3/12/24 mild chronic inflammation and reactive type II pneumocyte hyperplasia, no dysplasia or neoplasm identified; brushings and bronchioloalveolar lavage negative for malignancy    Cancer Staging   No matching staging information was found for the patient.    Oncology History    No history exists.            Subjective:    Patient ID: Freda Canas is a 78 y.o. female.    HPI    Freda Canas is a 78 y.o. female who presents today in follow-up of a previously identified right upper lobe lung mass.  This was found to be mildly  FDG avid on PET CT scan in March 2024 and the pathology from navigational bronchoscopy was negative for malignancy.  Given those findings she was recommended to have repeat CT scan in 3 months which was reviewed at time of last office visit on 6/12/2024.  That 3-month interval CT scan completed on 6/3/2024 demonstrated a resolved right upper lobe consolidation with small amount of residual groundglass opacity and bronchiectasis.  She was recommended to have repeat CT scan in 6 months to ensure complete resolution of the mass.     CT scan of the chest completed on 12/3/24 was personally reviewed by myself and in PACS demonstrating resolution of right upper lobe mass with stable residual 4 mm right upper lobe nodule/scar since June 2024 with further regression of groundglass opacity and mild benign residual linear scar. Nothing to indicate malignancy. Benign calcified granulomas. Mild dependent atelectasis in the left lower lobe. Benign intrapulmonary lymph nodes.    On discussion today, patient reports her breathing is stable.  She reports she cannot be as active as she used to be but does go to the gym almost every day.  Denies true chills but reports that she is generally cold. Denies cough, fevers, chills, chest pain, new headaches, extremity weakness/numbness, new onset bone or joint pain, unexplained weight loss.   Denies recent lung infections.    Smoking history: Lifelong non-smoker  Family history of lung cancer: none       The following portions of the patient's history were reviewed and updated as appropriate: allergies, current medications, past family history, past medical history, past social history, past surgical history and problem list.    Review of Systems   Constitutional:  Negative for chills, fever and unexpected weight change.   Respiratory:  Negative for cough and shortness of breath.    Cardiovascular:  Negative for chest pain.   Gastrointestinal:  Negative for abdominal pain.   Neurological:   "Negative for weakness, numbness and headaches.         Objective:   Physical Exam  Constitutional:       General: She is not in acute distress.  HENT:      Head: Normocephalic and atraumatic.      Nose: Nose normal.   Eyes:      Extraocular Movements: Extraocular movements intact.   Cardiovascular:      Rate and Rhythm: Normal rate and regular rhythm.   Pulmonary:      Effort: Pulmonary effort is normal.      Breath sounds: Normal breath sounds.   Abdominal:      Palpations: Abdomen is soft.      Tenderness: There is no abdominal tenderness.   Musculoskeletal:      Right lower leg: No edema.      Left lower leg: No edema.   Skin:     General: Skin is warm and dry.     /71 (BP Location: Left arm, Patient Position: Sitting, Cuff Size: Standard)   Pulse 66   Temp 97.9 °F (36.6 °C) (Temporal)   Resp 15   Ht 5' 2\" (1.575 m)   Wt 57.6 kg (127 lb)   SpO2 99%   BMI 23.23 kg/m²     No Chest XR results available for this patient.   CT chest wo contrast  Result Date: 12/7/2024  Narrative CT CHEST WITHOUT IV CONTRAST INDICATION:   R91.8: Other nonspecific abnormal finding of lung field. \"Follow-up right upper lobe mass.\" Never smoker. COMPARISON: CXR 10/07/2024, chest CT 6/3/2024, PET/CT 3/18/2024, chest CT 2/21/2024. TECHNIQUE: Chest CT without intravenous contrast.  Axial, sagittal, coronal 2D reformats and coronal MIPS from source data. Radiation dose length product (DLP):  69 mGy-cm . Radiation dose exposure minimized using iterative reconstruction and automated exposure control. FINDINGS: LUNGS: Resolution of right upper lobe mass with stable residual 4 mm right upper lobe nodule/scar since June 2024 (3/71) with further regression of groundglass opacity and mild benign residual linear scar. Nothing to indicate malignancy. Benign calcified  granulomas. Mild dependent atelectasis in the left lower lobe. Benign intrapulmonary lymph nodes. AIRWAYS: No significant filling defects. PLEURA:  Unremarkable. HEART/GREAT " VESSELS: Normal heart size. Mild coronary artery calcification indicating atherosclerotic heart disease. MEDIASTINUM AND DUNCAN:  Unremarkable. CHEST WALL AND LOWER NECK: Unremarkable. UPPER ABDOMEN:  Unremarkable. OSSEOUS STRUCTURES: Mild degenerative disease in the spine.     Impression Nothing to suggest malignancy with resolution of right upper lobe mass with stable residual 4 mm right upper lobe nodule/scar. Workstation performed: [id number]     CT chest wo contrast  Result Date: 6/11/2024  Narrative CT CHEST WITHOUT IV CONTRAST INDICATION: R91.8: Other nonspecific abnormal finding of lung field. Transbronchial biopsy of the right upper lobe on March 12, 2024 yielded benign findings. COMPARISON: CT chest February 21, 2024. Correlation with PET/CT March 18, 2024 TECHNIQUE: CT examination of the chest was performed without intravenous contrast. Multiplanar 2D reformatted images were created from the source data. This examination, like all CT scans performed in the [facility], was performed utilizing techniques to minimize radiation dose exposure, including the use of iterative reconstruction and automated exposure control. Radiation dose length product (DLP) for this visit: 146 mGy-cm FINDINGS: LUNGS: Resolved right upper lobe consolidation with small amount of residual groundglass and bronchiolectasis (for example series 3, image 74). 0.4 x 0.4 cm opacity at site of prior consolidation (series 3, image 73; series 601, image 61). Unchanged 0.3 cm juxtapleural left lower lobe nodule (series 3, image 139 PLEURA: Unremarkable. HEART/GREAT VESSELS: Normal heart size. Coronary calcifications and atherosclerotic calcifications of the aorta. No thoracic aortic aneurysm. MEDIASTINUM AND DUNCAN: Unremarkable. CHEST WALL AND LOWER NECK: Unremarkable. VISUALIZED STRUCTURES IN THE UPPER ABDOMEN: Unremarkable. OSSEOUS STRUCTURES: No acute fracture or destructive osseous lesion.     Impression Since February 21,  2024, resolved right upper lobe pneumonia with small amount of residual scarring. A 0.4 cm opacity at the site is more likely scarring than a nodule. Follow-up noncontrast chest CT is advised in 6-12 months. Workstation performed: [id number]     CT chest w contrast  Result Date: 2/21/2024  Narrative CT CHEST WITH IV CONTRAST INDICATION: R93.89: Abnormal findings on diagnostic imaging of other specified body structures. COMPARISON: No prior CT chest. Correlation with radiographs of the left ribs 2/4/2024 TECHNIQUE: CT examination of the chest was performed. Multiplanar 2D reformatted images were created from the source data. This examination, like all CT scans performed in the [facility], was performed utilizing techniques to minimize radiation dose exposure, including the use of iterative reconstruction and automated exposure control. Radiation dose length product (DLP) for this visit: 175 mGy-cm IV Contrast: 85 mL of iohexol (OMNIPAQUE) FINDINGS: LUNGS: Solid right upper lobe mass measures 3.6 x 3.8 cm (series 3, image 79). There are air bronchograms and ectatic airways (for example series 4, image 42). The mass has spiculated margins and contacts the lateral pleural surface. 0.3 cm left lower lobe juxtapleural nodule (series 4, image 67). 0.3 cm right upper lobe juxta fissural nodule (series 4, image 51; series 602, image 44). 0.3 cm calcified left lower lobe granuloma (series 3, image 172) There is no tracheal or endobronchial lesion. PLEURA: Unremarkable. HEART/GREAT VESSELS: Heart is normal size. Coronary artery calcifications. No thoracic aortic aneurysm. Atherosclerotic calcifications of the thoracic aorta. MEDIASTINUM AND DUNCAN: 1.3 x 1.3 cm right hilar lymph node (series 2, image 53). CHEST WALL AND LOWER NECK: Unremarkable. VISUALIZED STRUCTURES IN THE UPPER ABDOMEN: Unremarkable. OSSEOUS STRUCTURES: No acute fracture or destructive osseous lesion.     Impression Right upper lobe mass is  concerning for a primary lung cancer. Mildly enlarged right hilar lymph node may be metastatic or reactive. Tissue sampling is advised. I personally discussed this study with PRINCESS JEAN on 2/21/2024 12:12 PM. Resident: LEWIS DAVEY I, the attending radiologist, have reviewed the images and agree with the final report above. Workstation performed: [id number]     No CT Chest,Abdomen,Pelvis results available for this patient.   NM PET CT skull base to mid thigh  Result Date: 3/18/2024  Narrative PET/CT SCAN INDICATION: D38.1: Neoplasm of uncertain behavior of trachea, bronchus and lung R91.8: Other nonspecific abnormal finding of lung field, right upper lobe lung mass and mildly enlarged right hilar lymph node MODIFIER: PI COMPARISON: CT of chest dated 2/21/2024 and CT renal stone study dated 2/4/2024. CT renal protocol dated 1/21/2019. CELL TYPE: Not applicable TECHNIQUE:   8.6 mCi F-18-FDG administered IV. Multiplanar attenuation corrected and non attenuation corrected PET images are available for interpretation, and contiguous, low dose, axial CT sections were obtained from the skull base through the femurs. Intravenous contrast material was not utilized. This examination, like all CT scans performed in the [facility], was performed utilizing techniques to minimize radiation dose exposure, including the use of iterative reconstruction and automated exposure control. Fasting serum glucose: 95 mg/dl FINDINGS: VISUALIZED BRAIN: No acute abnormalities are seen. HEAD/NECK: There is a physiologic distribution of FDG. No FDG avid cervical adenopathy is seen. CT images: Unremarkable. CHEST: Patient's known right upper lobe masslike opacity is mildly FDG avid and essentially stable in size but appears less dense than prior CT dated 2/21/2024, currently measuring approximately 4.0 x 2.8 cm on image 82 series 3 with max SUV of 2.7. The interval decrease in density could reflect an inflammatory etiology  although underlying hypermetabolic malignancy remains the diagnosis of exclusion. On PET image 90 and poorly characterized on low-dose unenhanced CT is subtle right hilar activity in the expected location of mildly enlarged right hilar lymph node seen on recent contrast-enhanced CT of chest with max SUV of 2.2 in this region which could reflect inflammatory/physiologic activity with early asael metastatic disease not excluded. CT images: Atherosclerotic vascular calcifications including those of the coronary arteries are noted. On image 99 of series 3 there is a subpleural 2 mm left lower lobe lung nodule. Patient's known additional tiny lung nodules were better characterized on prior dedicated CT of chest and are beyond the limits of resolution for PET/CT. ABDOMEN: No FDG avid soft tissue lesions are seen. CT images: Atherosclerotic vascular calcifications are noted. On image 137 of series 3 again noted is an indeterminant 1.9 cm isodense left lower pole renal mass, which was previously characterized to reflect a hyperdense cyst on CT renal protocol dated 1/21/2019. PELVIS: No FDG avid soft tissue lesions are seen. CT images: Unremarkable. OSSEOUS STRUCTURES: No FDG avid lesions are seen. CT images: No significant findings.     Impression 1. Hypermetabolic right upper lobe masslike opacity is essentially stable in size since prior CT but is decreased in density. While the mild interval decrease in density could reflect an improving inflammatory process, underlying hypermetabolic malignancy remains the diagnosis of exclusion. Further evaluation with tissue sampling is recommended as clinically indicated. If tissue sampling is not performed, continued short interval follow-up with CT of chest in 3 months is recommended to ensure stability. 2. Subtle low-level activity in the right hilar region and expected location of mildly enlarged right hilar lymph nodes seen on recent CT. Findings are nonspecific and could  reflect inflammatory activity with underlying asael metastatic disease not excluded. 3. Given possible underlying malignancy, short interval follow-up with CT of chest in 3 months is recommended to ensure stability patient's known 3 mm lung nodules which are better characterized on prior dedicated CT of chest dated 2/21/2024. Please see above for details and additional findings. The study was marked in EPIC for significant notification. The study was marked in Epic for follow-up. Workstation performed: [id number]      No Barium Swallow results available for this patient.

## 2024-12-23 ENCOUNTER — HOSPITAL ENCOUNTER (OUTPATIENT)
Age: 79
Discharge: HOME | End: 2024-12-23
Payer: COMMERCIAL

## 2024-12-23 DIAGNOSIS — Z12.31 SCREENING MAMMOGRAM, ENCOUNTER FOR: ICD-10-CM

## 2024-12-23 PROCEDURE — 77063 BREAST TOMOSYNTHESIS BI: CPT

## 2024-12-23 PROCEDURE — 77067 SCR MAMMO BI INCL CAD: CPT

## 2024-12-26 ENCOUNTER — ANNUAL EXAM (OUTPATIENT)
Dept: OBGYN CLINIC | Facility: CLINIC | Age: 79
End: 2024-12-26
Payer: COMMERCIAL

## 2024-12-26 VITALS
DIASTOLIC BLOOD PRESSURE: 50 MMHG | HEIGHT: 62 IN | SYSTOLIC BLOOD PRESSURE: 120 MMHG | WEIGHT: 128 LBS | BODY MASS INDEX: 23.55 KG/M2

## 2024-12-26 DIAGNOSIS — Z01.419 WOMEN'S ANNUAL ROUTINE GYNECOLOGICAL EXAMINATION: Primary | ICD-10-CM

## 2024-12-26 PROCEDURE — G0101 CA SCREEN;PELVIC/BREAST EXAM: HCPCS | Performed by: OBSTETRICS & GYNECOLOGY

## 2024-12-26 NOTE — PROGRESS NOTES
"Subjective      Freda Canas is a 79 y.o. female who presents for annual well woman exam.       Family history of breast cancer: yes - sister breast     Menstrual History:  OB History          3    Para   3    Term   3            AB        Living   3         SAB        IAB        Ectopic        Multiple        Live Births   3                  No LMP recorded. Patient is postmenopausal.       The following portions of the patient's history were reviewed and updated as appropriate: allergies, current medications, past family history, past medical history, past social history, past surgical history, and problem list.    Review of Systems  Review of Systems   Constitutional:  Negative for activity change, appetite change, chills, fatigue and fever.   Respiratory:  Negative for apnea, cough, chest tightness and shortness of breath.    Cardiovascular:  Negative for chest pain, palpitations and leg swelling.   Gastrointestinal:  Negative for abdominal pain, constipation, diarrhea, nausea and vomiting.   Genitourinary:  Negative for difficulty urinating, dysuria, flank pain, frequency, hematuria and urgency.   Neurological:  Negative for dizziness, seizures, syncope, light-headedness, numbness and headaches.   Psychiatric/Behavioral:  Negative for agitation and confusion.           Objective      /50 (BP Location: Left arm, Patient Position: Sitting, Cuff Size: Adult)   Ht 5' 2\" (1.575 m)   Wt 58.1 kg (128 lb)   BMI 23.41 kg/m²     Physical Exam  OBGyn Exam     General:   alert and oriented, in no acute distress, alert, appears stated age, and cooperative   Heart: regular rate and rhythm, S1, S2 normal, no murmur, click, rub or gallop   Lungs: clear to auscultation bilaterally   Abdomen: soft, non-tender, without masses or organomegaly   Vulva: normal   Vagina: normal mucosa, normal discharge   Cervix: no cervical motion tenderness and no lesions   Uterus: normal size   Adnexa:  Breast Exam:  " Pt informed w/our new system I only show the Rx from 6/8. I tried to locate pts paper chart but could not locate it. Pt informed that we do have a lot of charts out right now being scanned in and maybe to check w/her pharmacy.   normal adnexa  breasts appear normal, no suspicious masses, no skin or nipple changes or axillary nodes.                Assessment      @well woman@ .      @well woman@ .   79-year-old female   Annual exam  Chronic hypertension  Sister breast cancer passed 2023  Prior 3 vaginal delivery   Osteoporosis on  DEXA scan declines treatment  Plan  No Pap needed  Diet/exercise  Calcium/vitamin-D  Mammogram  Up-to-date with colonoscopy  Return to office for annual exam       All questions answered.     There are no Patient Instructions on file for this visit.

## 2024-12-27 ENCOUNTER — RESULTS FOLLOW-UP (OUTPATIENT)
Dept: OBGYN CLINIC | Facility: CLINIC | Age: 79
End: 2024-12-27

## 2025-01-06 ENCOUNTER — TELEPHONE (OUTPATIENT)
Dept: FAMILY MEDICINE CLINIC | Facility: CLINIC | Age: 80
End: 2025-01-06

## 2025-01-06 NOTE — TELEPHONE ENCOUNTER
Patient had a question regarding her medications getting filled only for 90 tablets not 100, she will call her pharmacy.

## 2025-01-09 NOTE — TELEPHONE ENCOUNTER
Patient called because all her prescription refills in the future need to be a quantity of 100 instead of 90. Please advise. Thank you.

## 2025-01-10 ENCOUNTER — TELEPHONE (OUTPATIENT)
Age: 80
End: 2025-01-10

## 2025-01-10 DIAGNOSIS — I10 ESSENTIAL HYPERTENSION: ICD-10-CM

## 2025-01-10 NOTE — TELEPHONE ENCOUNTER
Caller: Freda Canas    Doctor: Dr. Monte    Patient called stating that her prescription for amLODIPine (NORVASC) 5 mg tablet should be dispensed in 100 tablet quantities, not 90. She asked if we could update this so that next time she is due for a refill, she gets 100 tablets and not 90 tablets.     Call back number: [phone]

## 2025-01-14 ENCOUNTER — RA CDI HCC (OUTPATIENT)
Dept: OTHER | Facility: HOSPITAL | Age: 80
End: 2025-01-14

## 2025-01-19 NOTE — PROGRESS NOTES
Name: Freda Canas      : 1945      MRN: [id number]  Encounter Provider: Shayne Ennis MD  Encounter Date: 2025   Encounter department: [facility]  :  Assessment & Plan  Essential hypertension  Patient is stable with current anti-hypertensive medicine and continue to follow a low sodium diet and take current medication. All questions about this condition were answered today.        PAD (peripheral artery disease) (HCC)  Patient is stable  and will continue present plan of care and reassess at next routine visit. All questions about this problem from patient were answered today.        Anxiety disorder, unspecified type  Patient to continue utilizing medical therapy as well as counseling sources as applicable to condition. If suicidal thought or fear of suicide attempt, to call 911 and seek help immediately. Medications and therapy reviewed today and all patient  questions answered today.        Mixed hyperlipidemia  Patient  is stable with current medication and we discussed a low fat low cholesterol diet. Weight loss also discussed for this will help lower cholesterol also. Recheck lipids in 6 months.   Orders:  •  Comprehensive metabolic panel; Future  •  Lipid Panel with Direct LDL reflex; Future    Major depressive disorder with single episode, in remission (HCC)  Patient to continue utilizing medical therapy as well and counseling sources as applicable for condition. If  suicidal thought or fear of suicide to contact 911 and seek help immediately. Meds reviewed and patient questions answered today        Stage 3a chronic kidney disease (HCC)  Lab Results   Component Value Date    EGFR 64 10/07/2024    EGFR 61 2024    EGFR 67 07/15/2024    CREATININE 0.88 10/07/2024    CREATININE 0.91 2024    CREATININE 0.84 07/15/2024   Pt to avoid NSAIDs and any IV dyes. Patient to follow up eoither with nephrology or  with us for  further  monitoring of  renal  function.        Hyperlipidemia, unspecified hyperlipidemia type  Patient  is stable with current medication and we discussed a low fat low cholesterol diet. Weight loss also discussed for this will help lower cholesterol also. Recheck lipids in 6 months.              Falls Plan of Care: balance, strength, and gait training instructions were provided. Home safety education provided.     Urinary Incontinence Plan of Care: counseling topics discussed: practice Kegel (pelvic floor strengthening) exercises, use restroom every 2 hours, limit alcohol, caffeine, spicy foods, and acidic foods, limiting fluid intake 3-4 hours before bed, weight loss, preventing constipation and limiting fluid intake to 60 oz. per day.     History of Present Illness   79-year-old female here today for checkup for multimedical problems patient with a history of anxiety hypertension as well as hyperlipidemia PAD CKD stage IIIa and is stable.  Patient is having no problem with the medications and will call when she needs her refills.  We will see her back in approximately 6 months her blood pressure is doing very well we will do her lab work for cholesterol back at that time to because she is doing really well with that also.      Review of Systems   Constitutional:  Negative for activity change, appetite change, fatigue and fever.   HENT:  Negative for congestion, ear pain, postnasal drip, rhinorrhea, sinus pressure, sinus pain, sneezing and sore throat.    Eyes:  Negative for pain and redness.   Respiratory:  Negative for apnea, cough, chest tightness, shortness of breath and wheezing.    Cardiovascular:  Negative for chest pain, palpitations and leg swelling.   Gastrointestinal:  Negative for abdominal pain, constipation, diarrhea, nausea and vomiting.   Endocrine: Negative for cold intolerance and heat intolerance.   Genitourinary:  Negative for difficulty urinating, dysuria, frequency, hematuria and urgency.   Musculoskeletal:  Negative for  "arthralgias, back pain, gait problem and myalgias.   Skin:  Negative for rash.   Neurological:  Negative for dizziness, speech difficulty, weakness, numbness and headaches.   Hematological:  Does not bruise/bleed easily.   Psychiatric/Behavioral:  Negative for agitation, confusion and hallucinations.        Objective   /76 (BP Location: Left arm, Patient Position: Sitting, Cuff Size: Standard)   Pulse 80   Temp (!) 97.3 °F (36.3 °C) (Temporal)   Resp 16   Ht 5' 2\" (1.575 m)   Wt 57.2 kg (126 lb)   SpO2 99%   BMI 23.05 kg/m²      Physical Exam  Constitutional:       Appearance: Normal appearance. She is not ill-appearing.   HENT:      Head: Normocephalic and atraumatic.      Right Ear: Tympanic membrane normal.      Left Ear: Tympanic membrane normal.      Nose: Nose normal.      Mouth/Throat:      Mouth: Mucous membranes are moist.   Eyes:      Extraocular Movements: Extraocular movements intact.      Conjunctiva/sclera: Conjunctivae normal.      Pupils: Pupils are equal, round, and reactive to light.   Cardiovascular:      Rate and Rhythm: Normal rate and regular rhythm.   Pulmonary:      Effort: Pulmonary effort is normal. No respiratory distress.      Breath sounds: Normal breath sounds. No wheezing.   Abdominal:      General: Bowel sounds are normal.      Palpations: Abdomen is soft.      Tenderness: There is no abdominal tenderness.   Musculoskeletal:         General: No tenderness. Normal range of motion.      Cervical back: Normal range of motion and neck supple.      Right lower leg: No edema.      Left lower leg: No edema.   Skin:     General: Skin is warm and dry.   Neurological:      Mental Status: She is alert and oriented to person, place, and time.   Psychiatric:         Mood and Affect: Mood normal.         Behavior: Behavior normal.         Thought Content: Thought content normal.         Judgment: Judgment normal.         "

## 2025-01-19 NOTE — ASSESSMENT & PLAN NOTE
Lab Results   Component Value Date    EGFR 64 10/07/2024    EGFR 61 09/14/2024    EGFR 67 07/15/2024    CREATININE 0.88 10/07/2024    CREATININE 0.91 09/14/2024    CREATININE 0.84 07/15/2024   Pt to avoid NSAIDs and any IV dyes. Patient to follow up eoither with nephrology or  with us for  further  monitoring of  renal function.

## 2025-01-20 ENCOUNTER — OFFICE VISIT (OUTPATIENT)
Dept: FAMILY MEDICINE CLINIC | Facility: CLINIC | Age: 80
End: 2025-01-20
Payer: COMMERCIAL

## 2025-01-20 VITALS
HEART RATE: 80 BPM | OXYGEN SATURATION: 99 % | WEIGHT: 126 LBS | HEIGHT: 62 IN | RESPIRATION RATE: 16 BRPM | SYSTOLIC BLOOD PRESSURE: 138 MMHG | TEMPERATURE: 97.3 F | DIASTOLIC BLOOD PRESSURE: 76 MMHG | BODY MASS INDEX: 23.19 KG/M2

## 2025-01-20 DIAGNOSIS — F41.9 ANXIETY DISORDER, UNSPECIFIED TYPE: ICD-10-CM

## 2025-01-20 DIAGNOSIS — I10 ESSENTIAL HYPERTENSION: Primary | ICD-10-CM

## 2025-01-20 DIAGNOSIS — E78.5 HYPERLIPIDEMIA, UNSPECIFIED HYPERLIPIDEMIA TYPE: ICD-10-CM

## 2025-01-20 DIAGNOSIS — F32.5 MAJOR DEPRESSIVE DISORDER WITH SINGLE EPISODE, IN REMISSION (HCC): ICD-10-CM

## 2025-01-20 DIAGNOSIS — I73.9 PAD (PERIPHERAL ARTERY DISEASE) (HCC): ICD-10-CM

## 2025-01-20 DIAGNOSIS — E78.2 MIXED HYPERLIPIDEMIA: ICD-10-CM

## 2025-01-20 DIAGNOSIS — N18.31 STAGE 3A CHRONIC KIDNEY DISEASE (HCC): ICD-10-CM

## 2025-01-20 PROCEDURE — 99214 OFFICE O/P EST MOD 30 MIN: CPT | Performed by: FAMILY MEDICINE

## 2025-01-20 PROCEDURE — G2211 COMPLEX E/M VISIT ADD ON: HCPCS | Performed by: FAMILY MEDICINE

## 2025-01-30 ENCOUNTER — VBI (OUTPATIENT)
Dept: ADMINISTRATIVE | Facility: OTHER | Age: 80
End: 2025-01-30

## 2025-01-30 NOTE — TELEPHONE ENCOUNTER
01/30/25 6:02 PM     Chart reviewed for Blood Pressure was/were submitted to the patient's insurance.     Ann Carrera MA   [facility]

## 2025-02-13 DIAGNOSIS — I10 ESSENTIAL HYPERTENSION: ICD-10-CM

## 2025-02-13 RX ORDER — AMLODIPINE BESYLATE 5 MG/1
5 TABLET ORAL DAILY
Qty: 90 TABLET | Refills: 0 | Status: SHIPPED | OUTPATIENT
Start: 2025-02-13

## 2025-02-17 ENCOUNTER — TELEPHONE (OUTPATIENT)
Dept: OTHER | Facility: HOSPITAL | Age: 80
End: 2025-02-17

## 2025-02-17 NOTE — TELEPHONE ENCOUNTER
[facility] Jerome cat MD     Pt will contact pharmacy   amLODIPine (NORVASC) 5 mg [id number] - [place], [place] - [address]  E-Prescribing Status: Receipt confirmed by pharmacy (2/13/2025  1:07 PM EST)

## 2025-02-22 ENCOUNTER — APPOINTMENT (OUTPATIENT)
Dept: LAB | Facility: MEDICAL CENTER | Age: 80
End: 2025-02-22
Payer: COMMERCIAL

## 2025-02-22 DIAGNOSIS — E03.8 OTHER SPECIFIED HYPOTHYROIDISM: ICD-10-CM

## 2025-02-22 LAB
ALBUMIN SERPL BCG-MCNC: 3.9 G/DL (ref 3.5–5)
ALP SERPL-CCNC: 55 U/L (ref 34–104)
ALT SERPL W P-5'-P-CCNC: 13 U/L (ref 7–52)
ANION GAP SERPL CALCULATED.3IONS-SCNC: 7 MMOL/L (ref 4–13)
AST SERPL W P-5'-P-CCNC: 19 U/L (ref 13–39)
BACTERIA UR QL AUTO: ABNORMAL /HPF
BILIRUB SERPL-MCNC: 0.78 MG/DL (ref 0.2–1)
BILIRUB UR QL STRIP: NEGATIVE
BUN SERPL-MCNC: 28 MG/DL (ref 5–25)
CALCIUM SERPL-MCNC: 9.5 MG/DL (ref 8.4–10.2)
CHLORIDE SERPL-SCNC: 106 MMOL/L (ref 96–108)
CHOLEST SERPL-MCNC: 192 MG/DL (ref ?–200)
CLARITY UR: CLEAR
CO2 SERPL-SCNC: 28 MMOL/L (ref 21–32)
COLOR UR: ABNORMAL
CREAT SERPL-MCNC: 0.97 MG/DL (ref 0.6–1.3)
GFR SERPL CREATININE-BSD FRML MDRD: 56 ML/MIN/1.73SQ M
GLUCOSE P FAST SERPL-MCNC: 84 MG/DL (ref 65–99)
GLUCOSE UR STRIP-MCNC: NEGATIVE MG/DL
HDLC SERPL-MCNC: 41 MG/DL
HGB UR QL STRIP.AUTO: ABNORMAL
KETONES UR STRIP-MCNC: NEGATIVE MG/DL
LDLC SERPL CALC-MCNC: 133 MG/DL (ref 0–100)
LEUKOCYTE ESTERASE UR QL STRIP: ABNORMAL
MUCOUS THREADS UR QL AUTO: ABNORMAL
NITRITE UR QL STRIP: NEGATIVE
NON-SQ EPI CELLS URNS QL MICRO: ABNORMAL /HPF
PH UR STRIP.AUTO: 7.5 [PH]
POTASSIUM SERPL-SCNC: 4.1 MMOL/L (ref 3.5–5.3)
PROT SERPL-MCNC: 6.9 G/DL (ref 6.4–8.4)
PROT UR STRIP-MCNC: ABNORMAL MG/DL
RBC #/AREA URNS AUTO: ABNORMAL /HPF
SODIUM SERPL-SCNC: 141 MMOL/L (ref 135–147)
SP GR UR STRIP.AUTO: 1.02 (ref 1–1.03)
T4 FREE SERPL-MCNC: 0.68 NG/DL (ref 0.61–1.12)
TRIGL SERPL-MCNC: 90 MG/DL (ref ?–150)
TSH SERPL DL<=0.05 MIU/L-ACNC: 3.95 UIU/ML (ref 0.45–4.5)
UROBILINOGEN UR STRIP-ACNC: <2 MG/DL
WBC #/AREA URNS AUTO: ABNORMAL /HPF

## 2025-02-22 PROCEDURE — 84439 ASSAY OF FREE THYROXINE: CPT

## 2025-02-22 PROCEDURE — 36415 COLL VENOUS BLD VENIPUNCTURE: CPT

## 2025-02-22 PROCEDURE — 81001 URINALYSIS AUTO W/SCOPE: CPT

## 2025-02-22 PROCEDURE — 84443 ASSAY THYROID STIM HORMONE: CPT

## 2025-02-22 PROCEDURE — 87086 URINE CULTURE/COLONY COUNT: CPT

## 2025-02-22 PROCEDURE — 80053 COMPREHEN METABOLIC PANEL: CPT

## 2025-02-22 PROCEDURE — 80061 LIPID PANEL: CPT

## 2025-02-24 ENCOUNTER — RESULTS FOLLOW-UP (OUTPATIENT)
Dept: FAMILY MEDICINE CLINIC | Facility: CLINIC | Age: 80
End: 2025-02-24

## 2025-02-24 LAB — BACTERIA UR CULT: NORMAL

## 2025-02-27 DIAGNOSIS — I10 ESSENTIAL HYPERTENSION: ICD-10-CM

## 2025-02-27 RX ORDER — BENAZEPRIL HYDROCHLORIDE 40 MG/1
40 TABLET ORAL DAILY
Qty: 100 TABLET | Refills: 0 | OUTPATIENT
Start: 2025-02-27

## 2025-03-17 ENCOUNTER — VBI (OUTPATIENT)
Dept: ADMINISTRATIVE | Facility: OTHER | Age: 80
End: 2025-03-17

## 2025-03-17 NOTE — TELEPHONE ENCOUNTER
03/17/25 10:34 AM     Chart reviewed for Blood Pressure was/were submitted to the patient's insurance.     Ann Carrera MA   [facility]

## 2025-03-19 ENCOUNTER — APPOINTMENT (OUTPATIENT)
Dept: RADIOLOGY | Facility: MEDICAL CENTER | Age: 80
End: 2025-03-19
Payer: COMMERCIAL

## 2025-03-19 ENCOUNTER — OFFICE VISIT (OUTPATIENT)
Dept: URGENT CARE | Facility: MEDICAL CENTER | Age: 80
End: 2025-03-19
Payer: COMMERCIAL

## 2025-03-19 VITALS
HEART RATE: 68 BPM | OXYGEN SATURATION: 98 % | DIASTOLIC BLOOD PRESSURE: 80 MMHG | BODY MASS INDEX: 22.75 KG/M2 | SYSTOLIC BLOOD PRESSURE: 120 MMHG | WEIGHT: 124.38 LBS | RESPIRATION RATE: 16 BRPM | TEMPERATURE: 97.9 F

## 2025-03-19 DIAGNOSIS — M54.9 ACUTE MIDLINE BACK PAIN, UNSPECIFIED BACK LOCATION: Primary | ICD-10-CM

## 2025-03-19 DIAGNOSIS — M54.9 ACUTE MIDLINE BACK PAIN, UNSPECIFIED BACK LOCATION: ICD-10-CM

## 2025-03-19 PROCEDURE — 72072 X-RAY EXAM THORAC SPINE 3VWS: CPT

## 2025-03-19 PROCEDURE — 99213 OFFICE O/P EST LOW 20 MIN: CPT | Performed by: PHYSICIAN ASSISTANT

## 2025-03-19 PROCEDURE — 72100 X-RAY EXAM L-S SPINE 2/3 VWS: CPT

## 2025-03-19 NOTE — PROGRESS NOTES
[facility]        NAME: Freda Canas is a 79 y.o. female  : 1945    MRN: [id number]  DATE: 2025  TIME: 9:46 AM    Assessment and Plan   Acute midline back pain, unspecified back location [M54.9]  1. Acute midline back pain, unspecified back location  XR spine thoracic 2 vw    XR spine lumbar 2 or 3 views injury            Patient Instructions     Mid back pain  Referred to Orthopedics  Follow up with PCP in 3-5 days.  Proceed to  ER if symptoms worsen.    Chief Complaint     Chief Complaint   Patient presents with    Back Pain     Mid back pain for 3 days. Denies trauma injury and falls. Some pain when taking deep breaths. No fever.          History of Present Illness       79-year-old female who presents complaining of mid back pain that has progressively worsened over the last 6 days.  Patient denies history of trauma, fevers, urinary symptoms, fall.  States she has taken over-the-counter Tylenol with no relief.    Back Pain  Pertinent negatives include no chest pain.       Review of Systems   Review of Systems   Constitutional: Negative.    HENT: Negative.     Eyes: Negative.    Respiratory: Negative.  Negative for cough, chest tightness, shortness of breath, wheezing and stridor.    Cardiovascular: Negative.  Negative for chest pain, palpitations and leg swelling.   Musculoskeletal:  Positive for back pain.         Current Medications       Current Outpatient Medications:     amLODIPine (NORVASC) 5 mg tablet, TAKE ONE TABLET BY MOUTH ONCE DAILY, Disp: 90 tablet, Rfl: 0    benazepril (LOTENSIN) 40 MG tablet, Take 1 tablet (40 mg total) by mouth daily, Disp: 100 tablet, Rfl: 1    Biotin 5 MG TABS, Take by mouth, Disp: , Rfl:     calcium-vitamin D 250-100 MG-UNIT per tablet, Take 1 tablet by mouth 2 (two) times a day, Disp: , Rfl:     Coenzyme Q10 (COQ-10) 100 MG CAPS, Take 1 capsule by mouth daily, Disp: , Rfl:     escitalopram (LEXAPRO) 20 mg tablet, Take 1 tablet (20 mg total)  by mouth daily at bedtime, Disp: 100 tablet, Rfl: 1    ezetimibe (ZETIA) 10 mg tablet, Take 1 tablet (10 mg total) by mouth daily, Disp: 100 tablet, Rfl: 1    Magnesium Oxide -Mg Supplement 400 MG CAPS, Take 1 capsule by mouth daily, Disp: , Rfl:     metoprolol succinate (TOPROL-XL) 25 mg 24 hr tablet, Take 1 tablet (25 mg total) by mouth daily at bedtime, Disp: 100 tablet, Rfl: 1    Omega-3 Fatty Acids (Omega-3 Fish Oil) 500 MG CAPS, Take by mouth, Disp: , Rfl:     Plant Sterols and Stanols (CHOLESTOFF PO), Take by mouth, Disp: , Rfl:     Red Yeast Rice 600 MG CAPS, Take 2 capsules by mouth daily, Disp: , Rfl:     betamethasone, augmented, (DIPROLENE-AF) 0.05 % cream, Apply topically 2 (two) times a day (Patient not taking: Reported on 1/20/2025), Disp: 30 g, Rfl: 1    ciclopirox (LOPROX) 0.77 % cream, , Disp: , Rfl:     Current Facility-Administered Medications:     cyanocobalamin injection 1,000 mcg, 1,000 mcg, Intramuscular, Q30 Days, DONALD Velásquez, 1,000 mcg at 06/12/23 1312    cyanocobalamin injection 1,000 mcg, 1,000 mcg, Intramuscular, Q30 Days, Shayne Ennis MD, 1,000 mcg at 10/19/23 0928    Current Allergies     Allergies as of 03/19/2025 - Reviewed 03/19/2025   Allergen Reaction Noted    Imdur [isosorbide nitrate] Headache 09/05/2018    Sulfa antibiotics Hives 04/19/2017    Ciprofloxacin Rash and GI Intolerance 02/15/2019    Macrobid [nitrofurantoin] Other (See Comments) 04/04/2019            The following portions of the patient's history were reviewed and updated as appropriate: allergies, current medications, past family history, past medical history, past social history, past surgical history and problem list.     Past Medical History:   Diagnosis Date    Anxiety     Colon polyps     Essential hypertension 08/08/2018    Gallbladder polyp     Hyperlipidemia     Hypertension     Kidney cysts     Kidney stones     Lung disease        Past Surgical History:   Procedure Laterality Date     BRONCHOSCOPY N/A 3/12/2024    Procedure: NAVIGATIONAL BRONCHOSCOPY;  Surgeon: Brayan Ontiveros MD;  Location: [facility];  Service: Thoracic    COLONOSCOPY      COLONOSCOPY W/ POLYPECTOMY      ENDOBRONCHIAL ULTRASOUND (EBUS) N/A 3/12/2024    Procedure: ENDOBRONCHIAL ULTRASOUND (EBUS) with biopsy;  Surgeon: Brayan Ontiveros MD;  Location: [facility];  Service: Thoracic    HEMORRHOID SURGERY  1971    IA [facility] INCL FLUOR GDNCE DX W/CELL WASHG SPX N/A 3/12/2024    Procedure: FLEXIBLE BRONCHOSCOPY;  Surgeon: Brayan Ontiveros MD;  Location: [facility];  Service: Thoracic    IA EDG US EXAM SURGICAL ALTER STOM DUODENUM/JEJUNUM N/A 03/14/2019    Procedure: LINEAR ENDOSCOPIC U/S;  Surgeon: Roscoe Malagon MD;  Location: [facility];  Service: Gastroenterology       Family History   Problem Relation Age of Onset    Hyperlipidemia Mother     Heart attack Father     Heart murmur Sister     Stroke Sister     Hyperlipidemia Sister         all siblings     Breast cancer Sister 70    Parkinsonism Sister     Cancer Sister     No Known Problems Sister     No Known Problems Daughter     No Known Problems Maternal Grandmother     No Known Problems Maternal Grandfather     No Known Problems Paternal Grandmother     No Known Problems Paternal Grandfather     Heart attack Brother         open heart surgery     Deep vein thrombosis Brother     Hyperlipidemia Brother     Coronary artery disease Brother         stents placed     No Known Problems Brother     No Known Problems Son     No Known Problems Son     Heart attack Paternal Aunt     Heart attack Paternal Uncle     Hypertension Family         all in the family both sides    Anuerysm Neg Hx     Heart failure Neg Hx          Medications have been verified.        Objective   /80   Pulse 68   Temp 97.9 °F (36.6 °C)   Resp 16   Wt 56.4 kg (124 lb 6 oz)   SpO2 98%   BMI 22.75 kg/m²        Physical Exam     Physical Exam  Constitutional:       Appearance: She is well-developed.    HENT:      Head: Normocephalic and atraumatic.      Right Ear: External ear normal.      Left Ear: External ear normal.      Nose: Nose normal.      Mouth/Throat:      Pharynx: No oropharyngeal exudate.   Cardiovascular:      Rate and Rhythm: Normal rate and regular rhythm.      Heart sounds: Normal heart sounds.   Pulmonary:      Effort: Pulmonary effort is normal. No respiratory distress.      Breath sounds: Normal breath sounds. No wheezing or rales.   Chest:      Chest wall: No tenderness.   Abdominal:      General: Abdomen is flat. Bowel sounds are normal. There is no distension.      Palpations: Abdomen is soft. There is no mass or pulsatile mass.      Tenderness: There is no abdominal tenderness. There is no right CVA tenderness, left CVA tenderness, guarding or rebound. Negative signs include Vazquez's sign and McBurney's sign.   Musculoskeletal:      Cervical back: Normal, normal range of motion and neck supple.      Thoracic back: Spasms, tenderness and bony tenderness present. Normal range of motion.      Lumbar back: Spasms and tenderness present.        Back:    Lymphadenopathy:      Cervical: No cervical adenopathy.

## 2025-03-19 NOTE — PATIENT INSTRUCTIONS
Mid back pain  Referred to Orthopedics  Follow up with PCP in 3-5 days.  Proceed to  ER if symptoms worsen.

## 2025-03-21 ENCOUNTER — OFFICE VISIT (OUTPATIENT)
Dept: FAMILY MEDICINE CLINIC | Facility: CLINIC | Age: 80
End: 2025-03-21
Payer: COMMERCIAL

## 2025-03-21 VITALS
WEIGHT: 126 LBS | BODY MASS INDEX: 23.19 KG/M2 | HEIGHT: 62 IN | TEMPERATURE: 97.5 F | SYSTOLIC BLOOD PRESSURE: 140 MMHG | OXYGEN SATURATION: 98 % | DIASTOLIC BLOOD PRESSURE: 72 MMHG | RESPIRATION RATE: 18 BRPM | HEART RATE: 65 BPM

## 2025-03-21 DIAGNOSIS — I10 ESSENTIAL HYPERTENSION: ICD-10-CM

## 2025-03-21 DIAGNOSIS — E78.2 MIXED HYPERLIPIDEMIA: ICD-10-CM

## 2025-03-21 DIAGNOSIS — F41.9 ANXIETY DISORDER, UNSPECIFIED TYPE: Primary | ICD-10-CM

## 2025-03-21 DIAGNOSIS — F32.5 MAJOR DEPRESSIVE DISORDER WITH SINGLE EPISODE, IN REMISSION (HCC): ICD-10-CM

## 2025-03-21 DIAGNOSIS — N18.31 STAGE 3A CHRONIC KIDNEY DISEASE (HCC): ICD-10-CM

## 2025-03-21 PROCEDURE — 99214 OFFICE O/P EST MOD 30 MIN: CPT | Performed by: FAMILY MEDICINE

## 2025-03-21 PROCEDURE — G2211 COMPLEX E/M VISIT ADD ON: HCPCS | Performed by: FAMILY MEDICINE

## 2025-03-21 NOTE — PROGRESS NOTES
Name: Freda Canas      : 1945      MRN: [id number]  Encounter Provider: Shayne Ennis MD  Encounter Date: 3/21/2025   Encounter department: [facility]  :  Assessment & Plan  Anxiety disorder, unspecified type  Patient to continue utilizing medical therapy as well as counseling sources as applicable to condition. If suicidal thought or fear of suicide attempt, to call 911 and seek help immediately. Medications and therapy reviewed today and all patient  questions answered today.        Essential hypertension  Patient is stable with current anti-hypertensive medicine and continue to follow a low sodium diet and take current medication. All questions about this condition were answered today.        Mixed hyperlipidemia  Patient  is stable with current medication and we discussed a low fat low cholesterol diet. Weight loss also discussed for this will help lower cholesterol also. Recheck lipids in 6 months.        Major depressive disorder with single episode, in remission (HCC)  Patient to continue utilizing medical therapy as well and counseling sources as applicable for condition. If  suicidal thought or fear of suicide to contact 911 and seek help immediately. Meds reviewed and patient questions answered today        Stage 3a chronic kidney disease (HCC)  Lab Results   Component Value Date    EGFR 56 2025    EGFR 64 10/07/2024    EGFR 61 2024    CREATININE 0.97 2025    CREATININE 0.88 10/07/2024    CREATININE 0.91 2024   Pt to avoid NSAIDs and any IV dyes. Patient to follow up eoither with nephrology or  with us for  further  monitoring of  renal function.              Depression Screening and Follow-up Plan:   Patient assessed for underlying major depression. Brief counseling provided and recommend additional follow-up/re-evaluation next office visit.     Falls Plan of Care: balance, strength, and gait training instructions were provided. Home  safety education provided.     Urinary Incontinence Plan of Care: counseling topics discussed: practice Kegel (pelvic floor strengthening) exercises, use restroom every 2 hours, limit alcohol, caffeine, spicy foods, and acidic foods, limiting fluid intake 3-4 hours before bed, weight loss, preventing constipation and limiting fluid intake to 60 oz. per day.     History of Present Illness   79-year-old female today for checkup on multimedical Bosi patient hypertension hyperlipidemia history of depression anxiety and is doing very well.  Patient had her lab work done and reviewed and other than having some contaminated urine rest of her lab work is really unremarkable cholesterol is well-controlled she has chronic kidney disease stage IIIa which is really good for somebody with 79 with well-controlled hypertension.  She is now smoking and she exercise on a regular basis and takes very good care of herself.  She is having some stress currently with her son going through a divorce but otherwise she is doing quite well.  Patient is not any refills on her medications and she will call when she needs refills.  Patient is to get 100 count tablets for her refills.      Review of Systems   Constitutional:  Negative for activity change, appetite change, fatigue and fever.   HENT:  Negative for congestion, ear pain, postnasal drip, rhinorrhea, sinus pressure, sinus pain, sneezing and sore throat.    Eyes:  Negative for pain and redness.   Respiratory:  Negative for apnea, cough, chest tightness, shortness of breath and wheezing.    Cardiovascular:  Negative for chest pain, palpitations and leg swelling.   Gastrointestinal:  Negative for abdominal pain, constipation, diarrhea, nausea and vomiting.   Endocrine: Negative for cold intolerance and heat intolerance.   Genitourinary:  Negative for difficulty urinating, dysuria, frequency, hematuria and urgency.   Musculoskeletal:  Negative for arthralgias, back pain, gait problem and  "myalgias.   Skin:  Negative for rash.   Neurological:  Negative for dizziness, speech difficulty, weakness, numbness and headaches.   Hematological:  Does not bruise/bleed easily.   Psychiatric/Behavioral:  Negative for agitation, confusion and hallucinations.        Objective   /72 (BP Location: Left arm, Patient Position: Sitting, Cuff Size: Standard)   Pulse 65   Temp 97.5 °F (36.4 °C) (Temporal)   Resp 18   Ht 5' 2\" (1.575 m)   Wt 57.2 kg (126 lb)   SpO2 98%   BMI 23.05 kg/m²      Physical Exam  Constitutional:       Appearance: Normal appearance. She is not ill-appearing.   HENT:      Head: Normocephalic and atraumatic.      Right Ear: Tympanic membrane normal.      Left Ear: Tympanic membrane normal.      Nose: Nose normal.      Mouth/Throat:      Mouth: Mucous membranes are moist.   Eyes:      Extraocular Movements: Extraocular movements intact.      Conjunctiva/sclera: Conjunctivae normal.      Pupils: Pupils are equal, round, and reactive to light.   Cardiovascular:      Rate and Rhythm: Normal rate and regular rhythm.   Pulmonary:      Effort: Pulmonary effort is normal. No respiratory distress.      Breath sounds: Normal breath sounds. No wheezing.   Abdominal:      General: Bowel sounds are normal.      Palpations: Abdomen is soft.      Tenderness: There is no abdominal tenderness.   Musculoskeletal:         General: No tenderness. Normal range of motion.      Cervical back: Normal range of motion and neck supple.      Right lower leg: No edema.      Left lower leg: No edema.   Skin:     General: Skin is warm and dry.   Neurological:      Mental Status: She is alert and oriented to person, place, and time.   Psychiatric:         Mood and Affect: Mood normal.         Behavior: Behavior normal.         Thought Content: Thought content normal.         Judgment: Judgment normal.     Name: Freda Canas      : 1945      MRN: [id number]  Encounter Provider: Shayne Ennis MD  " "Encounter Date: 3/21/2025   Encounter department: [facility]  :  Assessment & Plan  Anxiety disorder, unspecified type  Patient to continue utilizing medical therapy as well as counseling sources as applicable to condition. If suicidal thought or fear of suicide attempt, to call 911 and seek help immediately. Medications and therapy reviewed today and all patient  questions answered today.        Essential hypertension  Patient is stable with current anti-hypertensive medicine and continue to follow a low sodium diet and take current medication. All questions about this condition were answered today.        Mixed hyperlipidemia  Patient  is stable with current medication and we discussed a low fat low cholesterol diet. Weight loss also discussed for this will help lower cholesterol also. Recheck lipids in 6 months.        Major depressive disorder with single episode, in remission (HCC)  Patient to continue utilizing medical therapy as well and counseling sources as applicable for condition. If  suicidal thought or fear of suicide to contact 911 and seek help immediately. Meds reviewed and patient questions answered today        Stage 3a chronic kidney disease (HCC)  Lab Results   Component Value Date    EGFR 56 02/22/2025    EGFR 64 10/07/2024    EGFR 61 09/14/2024    CREATININE 0.97 02/22/2025    CREATININE 0.88 10/07/2024    CREATININE 0.91 09/14/2024   Pt to avoid NSAIDs and any IV dyes. Patient to follow up eoither with nephrology or  with us for  further  monitoring of  renal function.               History of Present Illness   HPI  Review of Systems    Objective   /72 (BP Location: Left arm, Patient Position: Sitting, Cuff Size: Standard)   Pulse 65   Temp 97.5 °F (36.4 °C) (Temporal)   Resp 18   Ht 5' 2\" (1.575 m)   Wt 57.2 kg (126 lb)   SpO2 98%   BMI 23.05 kg/m²      Physical Exam  Administrative Statements   I have spent a total time of 15   minutes in caring for " this patient on the day of the visit/encounter including Prognosis    .  Administrative Statements   I have spent a total time of 15 minutes in caring for this patient on the day of the visit/encounter including Prognosis.

## 2025-03-21 NOTE — ASSESSMENT & PLAN NOTE
Lab Results   Component Value Date    EGFR 56 02/22/2025    EGFR 64 10/07/2024    EGFR 61 09/14/2024    CREATININE 0.97 02/22/2025    CREATININE 0.88 10/07/2024    CREATININE 0.91 09/14/2024   Pt to avoid NSAIDs and any IV dyes. Patient to follow up eoither with nephrology or  with us for  further  monitoring of  renal function.

## 2025-05-13 DIAGNOSIS — I10 ESSENTIAL HYPERTENSION: ICD-10-CM

## 2025-05-14 RX ORDER — AMLODIPINE BESYLATE 5 MG/1
5 TABLET ORAL DAILY
Qty: 90 TABLET | Refills: 0 | Status: SHIPPED | OUTPATIENT
Start: 2025-05-14 | End: 2025-05-19 | Stop reason: SDUPTHER

## 2025-05-19 ENCOUNTER — OFFICE VISIT (OUTPATIENT)
Dept: CARDIOLOGY CLINIC | Facility: MEDICAL CENTER | Age: 80
End: 2025-05-19
Payer: COMMERCIAL

## 2025-05-19 VITALS
OXYGEN SATURATION: 98 % | DIASTOLIC BLOOD PRESSURE: 70 MMHG | HEIGHT: 62 IN | HEART RATE: 62 BPM | WEIGHT: 125 LBS | BODY MASS INDEX: 23 KG/M2 | SYSTOLIC BLOOD PRESSURE: 142 MMHG

## 2025-05-19 DIAGNOSIS — E78.2 MIXED HYPERLIPIDEMIA: ICD-10-CM

## 2025-05-19 DIAGNOSIS — I73.9 PAD (PERIPHERAL ARTERY DISEASE) (HCC): Primary | ICD-10-CM

## 2025-05-19 DIAGNOSIS — I10 ESSENTIAL HYPERTENSION: ICD-10-CM

## 2025-05-19 PROCEDURE — 99214 OFFICE O/P EST MOD 30 MIN: CPT | Performed by: INTERNAL MEDICINE

## 2025-05-19 RX ORDER — AMLODIPINE BESYLATE 5 MG/1
5 TABLET ORAL DAILY
Qty: 90 TABLET | Refills: 3 | Status: SHIPPED | OUTPATIENT
Start: 2025-05-19

## 2025-05-19 NOTE — PROGRESS NOTES
Cardiology   Freda Canas 79 y.o. female MRN: [id number]        Impression:  1. Chest pain - no evidence of myocardial ischemia. No further episodes.  2. Hypertension - continue current medications.    3. Dyslipidemia - slightly elevated on Zetia.  4. Moderate Bilateral LE PAD by vascular study - no PAD by vascular surgeon.     Recommendations:  1. Continue current medications.  2. Check BP 2x/week.  If consistently greater than 140, call office.   3. Follow up in 12 months.         HPI: Freda Canas is a 79 y.o. year old female with PAD, hypertension and dyslipidemia who presents for follow up.  Had pharmacologic stress test instead of an exercise stress test b/c of elevated blood pressure - no ischemia.  No chest pain, shortness of breath, or palpitations.          Review of Systems   Constitutional: Negative.    HENT: Negative.     Eyes: Negative.    Respiratory:  Negative for chest tightness and shortness of breath.    Cardiovascular:  Negative for chest pain, palpitations and leg swelling.   Gastrointestinal: Negative.    Endocrine: Negative.    Genitourinary: Negative.    Musculoskeletal: Negative.    Skin: Negative.    Allergic/Immunologic: Negative.    Neurological: Negative.    Hematological: Negative.    Psychiatric/Behavioral: Negative.     All other systems reviewed and are negative.        Past Medical History:   Diagnosis Date    Anxiety     Colon polyps     Essential hypertension 08/08/2018    Gallbladder polyp     Hyperlipidemia     Hypertension     Kidney cysts     Kidney stones     Lung disease      Past Surgical History:   Procedure Laterality Date    BRONCHOSCOPY N/A 3/12/2024    Procedure: NAVIGATIONAL BRONCHOSCOPY;  Surgeon: Brayan Ontiveros MD;  Location: [facility];  Service: Thoracic    COLONOSCOPY      COLONOSCOPY W/ POLYPECTOMY      ENDOBRONCHIAL ULTRASOUND (EBUS) N/A 3/12/2024    Procedure: ENDOBRONCHIAL ULTRASOUND (EBUS) with biopsy;  Surgeon: Brayan Ontiveros MD;   Location: [facility];  Service: Thoracic    HEMORRHOID SURGERY  1971    [place] [facility] INCL FLUOR GDNCE DX W/CELL WASHG SPX N/A 3/12/2024    Procedure: FLEXIBLE BRONCHOSCOPY;  Surgeon: Brayan Ontiveros MD;  Location: [facility];  Service: Thoracic    [place] EDG US EXAM SURGICAL ALTER STOM DUODENUM/JEJUNUM N/A 03/14/2019    Procedure: LINEAR ENDOSCOPIC U/S;  Surgeon: Roscoe Malagon MD;  Location: [facility];  Service: Gastroenterology     Social History     Substance and Sexual Activity   Alcohol Use Not Currently    Comment: social - rare      Social History     Substance and Sexual Activity   Drug Use No     Tobacco Use History[1]  Family History   Problem Relation Age of Onset    Hyperlipidemia Mother     Heart attack Father     Heart murmur Sister     Stroke Sister     Hyperlipidemia Sister         all siblings     Breast cancer Sister 70    Parkinsonism Sister     Cancer Sister     No Known Problems Sister     No Known Problems Daughter     No Known Problems Maternal Grandmother     No Known Problems Maternal Grandfather     No Known Problems Paternal Grandmother     No Known Problems Paternal Grandfather     Heart attack Brother         open heart surgery     Deep vein thrombosis Brother     Hyperlipidemia Brother     Coronary artery disease Brother         stents placed     No Known Problems Brother     No Known Problems Son     No Known Problems Son     Heart attack Paternal Aunt     Heart attack Paternal Uncle     Hypertension Family         all in the family both sides    Anuerysm Neg Hx     Heart failure Neg Hx        Allergies:  Allergies[2]    Medications:   Current Medications[3]       Wt Readings from Last 3 Encounters:   05/19/25 56.7 kg (125 lb)   03/21/25 57.2 kg (126 lb)   03/19/25 56.4 kg (124 lb 6 oz)     Temp Readings from Last 3 Encounters:   03/21/25 97.5 °F (36.4 °C) (Temporal)   03/19/25 97.9 °F (36.6 °C)   01/20/25 (!) 97.3 °F (36.3 °C) (Temporal)     BP Readings from Last 3 Encounters:   05/19/25  "142/70   03/21/25 140/72   03/19/25 120/80     Pulse Readings from Last 3 Encounters:   05/19/25 62   03/21/25 65   03/19/25 68         Physical Exam  HENT:      Head: Atraumatic.      Mouth/Throat:      Mouth: Mucous membranes are moist.     Eyes:      Extraocular Movements: Extraocular movements intact.       Cardiovascular:      Rate and Rhythm: Normal rate and regular rhythm.      Heart sounds: Normal heart sounds.   Pulmonary:      Effort: Pulmonary effort is normal.      Breath sounds: Normal breath sounds.   Abdominal:      General: Abdomen is flat.     Musculoskeletal:         General: Normal range of motion.      Cervical back: Normal range of motion.     Skin:     General: Skin is warm.     Neurological:      General: No focal deficit present.      Mental Status: She is alert and oriented to person, place, and time.     Psychiatric:         Mood and Affect: Mood normal.         Behavior: Behavior normal.           Laboratory Studies:  CMP:  Lab Results   Component Value Date    K 4.1 02/22/2025     02/22/2025    CO2 28 02/22/2025    BUN 28 (H) 02/22/2025    CREATININE 0.97 02/22/2025    AST 19 02/22/2025    ALT 13 02/22/2025    EGFR 56 02/22/2025       Lipid Profile:   No results found for: \"CHOL\"  Lab Results   Component Value Date    HDL 41 (L) 02/22/2025     Lab Results   Component Value Date    LDLCALC 133 (H) 02/22/2025     Lab Results   Component Value Date    TRIG 90 02/22/2025                        [1]   Social History  Tobacco Use   Smoking Status Never    Passive exposure: Never   Smokeless Tobacco Never   [2]   Allergies  Allergen Reactions    Imdur [Isosorbide Nitrate] Headache    Sulfa Antibiotics Hives    Ciprofloxacin Rash and GI Intolerance    Macrobid [Nitrofurantoin] Other (See Comments)     Pt does not recall reaction   [3]   Current Outpatient Medications:     amLODIPine (NORVASC) 5 mg tablet, TAKE ONE TABLET BY MOUTH ONCE DAILY, Disp: 90 tablet, Rfl: 0    benazepril (LOTENSIN) " 40 MG tablet, Take 1 tablet (40 mg total) by mouth daily, Disp: 100 tablet, Rfl: 1    Biotin 5 MG TABS, Take by mouth, Disp: , Rfl:     calcium-vitamin D 250-100 MG-UNIT per tablet, Take 1 tablet by mouth in the morning and 1 tablet in the evening., Disp: , Rfl:     Coenzyme Q10 (COQ-10) 100 MG CAPS, Take 1 capsule by mouth in the morning., Disp: , Rfl:     escitalopram (LEXAPRO) 20 mg tablet, Take 1 tablet (20 mg total) by mouth daily at bedtime, Disp: 100 tablet, Rfl: 1    ezetimibe (ZETIA) 10 mg tablet, Take 1 tablet (10 mg total) by mouth daily, Disp: 100 tablet, Rfl: 1    Magnesium Oxide -Mg Supplement 400 MG CAPS, Take 1 capsule by mouth in the morning., Disp: , Rfl:     metoprolol succinate (TOPROL-XL) 25 mg 24 hr tablet, Take 1 tablet (25 mg total) by mouth daily at bedtime, Disp: 100 tablet, Rfl: 1    Omega-3 Fatty Acids (Omega-3 Fish Oil) 500 MG CAPS, Take by mouth, Disp: , Rfl:     Plant Sterols and Stanols (CHOLESTOFF PO), Take by mouth, Disp: , Rfl:     Red Yeast Rice 600 MG CAPS, Take 2 capsules by mouth in the morning., Disp: , Rfl:     Current Facility-Administered Medications:     cyanocobalamin injection 1,000 mcg, 1,000 mcg, Intramuscular, Q30 Days, DONALD Velásquez, 1,000 mcg at 06/12/23 1312    cyanocobalamin injection 1,000 mcg, 1,000 mcg, Intramuscular, Q30 Days, Shayne Ennis MD, 1,000 mcg at 10/19/23 0986

## 2025-06-04 DIAGNOSIS — I10 ESSENTIAL HYPERTENSION: ICD-10-CM

## 2025-06-04 RX ORDER — BENAZEPRIL HYDROCHLORIDE 40 MG/1
40 TABLET ORAL DAILY
Qty: 100 TABLET | Refills: 0 | Status: SHIPPED | OUTPATIENT
Start: 2025-06-04

## 2025-06-04 NOTE — TELEPHONE ENCOUNTER
Reason for call:   [x] Refill   [] Prior Auth  [] Other:     Office:   [x] PCP/Provider -   [] Specialty/Provider -     Medication: benazepril (LOTENSIN) 40 MG tablet     Dose/Frequency: Take 1 tablet (40 mg total) by mouth daily     Quantity: 100    Pharmacy: [facility] [place] [place]    Local Pharmacy   Does the patient have enough for 3 days?   [] Yes   [x] No - Send as HP to POD

## 2025-06-05 DIAGNOSIS — I10 ESSENTIAL HYPERTENSION: ICD-10-CM

## 2025-06-05 RX ORDER — BENAZEPRIL HYDROCHLORIDE 40 MG/1
40 TABLET ORAL DAILY
Qty: 100 TABLET | Refills: 0 | OUTPATIENT
Start: 2025-06-05

## 2025-06-05 NOTE — TELEPHONE ENCOUNTER
Reason for call:   [x] Refill   [] Prior Auth  [] Other:     Office:   [x] PCP/Provider - Shayne Ennis [facility]   [] Specialty/Provider -     Medication: Lotensin    Dose/Frequency: 40 mg     Quantity: #100    Pharmacy: [facility] [address] [place] [address] Pharmacy   Does the patient have enough for 3 days?   [] Yes   [x] No - Send as HP to POD    Mail Away Pharmacy   Does the patient have enough for 10 days?   [] Yes   [] No - Send as HP to POD

## 2025-06-18 DIAGNOSIS — F32.5 MAJOR DEPRESSIVE DISORDER WITH SINGLE EPISODE, IN REMISSION (HCC): ICD-10-CM

## 2025-06-18 RX ORDER — ESCITALOPRAM OXALATE 20 MG/1
20 TABLET ORAL
Qty: 100 TABLET | Refills: 1 | Status: SHIPPED | OUTPATIENT
Start: 2025-06-18

## 2025-06-23 ENCOUNTER — OFFICE VISIT (OUTPATIENT)
Dept: FAMILY MEDICINE CLINIC | Facility: CLINIC | Age: 80
End: 2025-06-23
Payer: COMMERCIAL

## 2025-06-23 VITALS
RESPIRATION RATE: 18 BRPM | HEART RATE: 67 BPM | BODY MASS INDEX: 23 KG/M2 | OXYGEN SATURATION: 99 % | SYSTOLIC BLOOD PRESSURE: 125 MMHG | DIASTOLIC BLOOD PRESSURE: 70 MMHG | WEIGHT: 125 LBS | HEIGHT: 62 IN | TEMPERATURE: 97.7 F

## 2025-06-23 DIAGNOSIS — I65.9 OCCLUSION AND STENOSIS OF UNSPECIFIED PRECEREBRAL ARTERY: ICD-10-CM

## 2025-06-23 DIAGNOSIS — I63.81 OTHER CEREBRAL INFARCTION DUE TO OCCLUSION OR STENOSIS OF SMALL ARTERY (HCC): ICD-10-CM

## 2025-06-23 DIAGNOSIS — H53.9 VISUAL DISTURBANCE: Primary | ICD-10-CM

## 2025-06-23 PROCEDURE — G2211 COMPLEX E/M VISIT ADD ON: HCPCS | Performed by: FAMILY MEDICINE

## 2025-06-23 PROCEDURE — 99214 OFFICE O/P EST MOD 30 MIN: CPT | Performed by: FAMILY MEDICINE

## 2025-06-23 NOTE — PROGRESS NOTES
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       Objective   /70 (BP Location: Left arm, Patient Position: Sitting, Cuff Size: Standard)   Pulse 67   Temp 97.7 °F (36.5 °C) (Temporal)   Resp 18   Ht 5' 2\" (1.575 m)   Wt 56.7 kg (125 lb)   SpO2 99%   BMI 22.86 kg/m²      Physical Exam    Eyes:      General:         Right eye: No discharge.         Left eye: No discharge.      Extraocular Movements: Extraocular movements intact.      Conjunctiva/sclera: Conjunctivae normal.      Pupils: Pupils are equal, round, and reactive to light.     Neck:      Vascular: No carotid bruit.         "

## 2025-06-25 ENCOUNTER — HOSPITAL ENCOUNTER (OUTPATIENT)
Dept: RADIOLOGY | Facility: HOSPITAL | Age: 80
Discharge: HOME | End: 2025-06-25
Attending: FAMILY MEDICINE
Payer: COMMERCIAL

## 2025-06-25 DIAGNOSIS — I65.9 OCCLUSION AND STENOSIS OF UNSPECIFIED PRECEREBRAL ARTERY: ICD-10-CM

## 2025-06-25 DIAGNOSIS — I63.81 OTHER CEREBRAL INFARCTION DUE TO OCCLUSION OR STENOSIS OF SMALL ARTERY (HCC): ICD-10-CM

## 2025-06-25 DIAGNOSIS — H53.9 VISUAL DISTURBANCE: ICD-10-CM

## 2025-06-25 PROCEDURE — A9585 GADOBUTROL INJECTION: HCPCS | Performed by: FAMILY MEDICINE

## 2025-06-25 PROCEDURE — 70544 MR ANGIOGRAPHY HEAD W/O DYE: CPT

## 2025-06-25 PROCEDURE — 70549 MR ANGIOGRAPH NECK W/O&W/DYE: CPT

## 2025-06-25 RX ORDER — GADOBUTROL 604.72 MG/ML
7 INJECTION INTRAVENOUS
Status: COMPLETED | OUTPATIENT
Start: 2025-06-25 | End: 2025-06-25

## 2025-06-25 RX ADMIN — GADOBUTROL 7 ML: 604.72 INJECTION INTRAVENOUS at 14:42

## 2025-07-08 ENCOUNTER — OFFICE VISIT (OUTPATIENT)
Dept: FAMILY MEDICINE CLINIC | Facility: CLINIC | Age: 80
End: 2025-07-08
Payer: COMMERCIAL

## 2025-07-08 VITALS
RESPIRATION RATE: 16 BRPM | HEART RATE: 68 BPM | SYSTOLIC BLOOD PRESSURE: 110 MMHG | HEIGHT: 62 IN | WEIGHT: 124 LBS | DIASTOLIC BLOOD PRESSURE: 62 MMHG | BODY MASS INDEX: 22.82 KG/M2 | OXYGEN SATURATION: 98 % | TEMPERATURE: 97.7 F

## 2025-07-08 DIAGNOSIS — R39.9 UTI SYMPTOMS: Primary | ICD-10-CM

## 2025-07-08 LAB
SL AMB  POCT GLUCOSE, UA: ABNORMAL
SL AMB LEUKOCYTE ESTERASE,UA: ABNORMAL
SL AMB POCT BILIRUBIN,UA: ABNORMAL
SL AMB POCT BLOOD,UA: ABNORMAL
SL AMB POCT CLARITY,UA: ABNORMAL
SL AMB POCT COLOR,UA: YELLOW
SL AMB POCT KETONES,UA: ABNORMAL
SL AMB POCT NITRITE,UA: ABNORMAL
SL AMB POCT PH,UA: 6
SL AMB POCT SPECIFIC GRAVITY,UA: 1
SL AMB POCT URINE PROTEIN: ABNORMAL
SL AMB POCT UROBILINOGEN: 0.2

## 2025-07-08 PROCEDURE — 99213 OFFICE O/P EST LOW 20 MIN: CPT | Performed by: NURSE PRACTITIONER

## 2025-07-08 PROCEDURE — 81002 URINALYSIS NONAUTO W/O SCOPE: CPT | Performed by: NURSE PRACTITIONER

## 2025-07-08 NOTE — PROGRESS NOTES
":  Assessment & Plan  UTI symptoms    Orders:    POCT urine dip    amoxicillin-clavulanate (AUGMENTIN) 875-125 mg per tablet; Take 1 tablet by mouth every 12 (twelve) hours for 5 days      Dosing all possible side effects of the prescribed medications or medications that had been prescribed in the past were reviewed and all questions were answered.  Advised I would like to see her back in the office in 7 days for follow-up evaluation of her urine.  If continues to have blood we will refer to urology for assessment possible imaging will be ordered at that time as well.  Patient verbalized agreement and understanding of the plan of care as outlined during the office visit today return to office as indicated or sooner if a problem arises.    History of Present Illness     Freda Canas is a 79 y.o. female   Starting yesterday with blood in the urine and pain during urination. )      Review of Systems   Constitutional:  Negative for fever.   Respiratory:  Negative for shortness of breath.    Cardiovascular:  Negative for chest pain.   Genitourinary:  Positive for dysuria, frequency and urgency.     Objective   /62 (BP Location: Left arm, Patient Position: Sitting, Cuff Size: Standard)   Pulse 68   Temp 97.7 °F (36.5 °C) (Temporal)   Resp 16   Ht 5' 2\" (1.575 m)   Wt 56.2 kg (124 lb)   SpO2 98%   BMI 22.68 kg/m²      Physical Exam    Cardiovascular:      Rate and Rhythm: Normal rate and regular rhythm.      Heart sounds: Normal heart sounds.   Pulmonary:      Effort: Pulmonary effort is normal.      Breath sounds: Normal breath sounds.           "

## 2025-07-15 ENCOUNTER — OFFICE VISIT (OUTPATIENT)
Dept: FAMILY MEDICINE CLINIC | Facility: CLINIC | Age: 80
End: 2025-07-15
Payer: COMMERCIAL

## 2025-07-15 ENCOUNTER — APPOINTMENT (OUTPATIENT)
Dept: LAB | Facility: MEDICAL CENTER | Age: 80
End: 2025-07-15
Payer: COMMERCIAL

## 2025-07-15 VITALS
TEMPERATURE: 97.9 F | DIASTOLIC BLOOD PRESSURE: 60 MMHG | SYSTOLIC BLOOD PRESSURE: 120 MMHG | OXYGEN SATURATION: 96 % | WEIGHT: 124 LBS | HEART RATE: 63 BPM | HEIGHT: 62 IN | BODY MASS INDEX: 22.82 KG/M2 | RESPIRATION RATE: 16 BRPM

## 2025-07-15 DIAGNOSIS — R31.29 MICROSCOPIC HEMATURIA: ICD-10-CM

## 2025-07-15 DIAGNOSIS — Z87.440 HISTORY OF UTI: Primary | ICD-10-CM

## 2025-07-15 LAB
ANION GAP SERPL CALCULATED.3IONS-SCNC: 10 MMOL/L (ref 4–13)
BUN SERPL-MCNC: 32 MG/DL (ref 5–25)
CALCIUM SERPL-MCNC: 9.1 MG/DL (ref 8.4–10.2)
CHLORIDE SERPL-SCNC: 102 MMOL/L (ref 96–108)
CO2 SERPL-SCNC: 25 MMOL/L (ref 21–32)
CREAT SERPL-MCNC: 0.95 MG/DL (ref 0.6–1.3)
GFR SERPL CREATININE-BSD FRML MDRD: 57 ML/MIN/1.73SQ M
GLUCOSE P FAST SERPL-MCNC: 99 MG/DL (ref 65–99)
POTASSIUM SERPL-SCNC: 4.2 MMOL/L (ref 3.5–5.3)
SL AMB  POCT GLUCOSE, UA: ABNORMAL
SL AMB LEUKOCYTE ESTERASE,UA: ABNORMAL
SL AMB POCT BILIRUBIN,UA: ABNORMAL
SL AMB POCT BLOOD,UA: ABNORMAL
SL AMB POCT CLARITY,UA: CLEAR
SL AMB POCT COLOR,UA: YELLOW
SL AMB POCT KETONES,UA: ABNORMAL
SL AMB POCT NITRITE,UA: ABNORMAL
SL AMB POCT PH,UA: 6
SL AMB POCT SPECIFIC GRAVITY,UA: 1
SL AMB POCT URINE PROTEIN: ABNORMAL
SL AMB POCT UROBILINOGEN: 0.2
SODIUM SERPL-SCNC: 137 MMOL/L (ref 135–147)

## 2025-07-15 PROCEDURE — 80048 BASIC METABOLIC PNL TOTAL CA: CPT | Performed by: NURSE PRACTITIONER

## 2025-07-15 PROCEDURE — 36415 COLL VENOUS BLD VENIPUNCTURE: CPT | Performed by: NURSE PRACTITIONER

## 2025-07-15 PROCEDURE — 81002 URINALYSIS NONAUTO W/O SCOPE: CPT | Performed by: NURSE PRACTITIONER

## 2025-07-15 PROCEDURE — 99213 OFFICE O/P EST LOW 20 MIN: CPT | Performed by: NURSE PRACTITIONER

## 2025-07-24 ENCOUNTER — HOSPITAL ENCOUNTER (OUTPATIENT)
Dept: CT IMAGING | Facility: CLINIC | Age: 80
Discharge: HOME | End: 2025-07-24
Attending: NURSE PRACTITIONER
Payer: COMMERCIAL

## 2025-07-24 DIAGNOSIS — R31.29 MICROSCOPIC HEMATURIA: ICD-10-CM

## 2025-07-24 PROCEDURE — 74178 CT ABD&PLV WO CNTR FLWD CNTR: CPT

## 2025-07-24 RX ADMIN — IOHEXOL 75 ML: 350 INJECTION, SOLUTION INTRAVENOUS at 10:20

## (undated) DEVICE — SYRINGE 10ML SLIP TIP LF

## (undated) DEVICE — BRONCHOSCOPE PATIENT INTRODUCER KIT: Brand: BRONCHOSCOPE PATIENT INTRODUCER KIT

## (undated) DEVICE — BIOPSY FORCEPS - SMOOTH CUP: Brand: BIOPSY FORCEPS - SMOOTH CUP

## (undated) DEVICE — NEEDLE TBNA PERIVIEW FLEX 21GA 20MM

## (undated) DEVICE — IV EXTENSION TUBING 33 IN

## (undated) DEVICE — SYRINGE 20ML LL

## (undated) DEVICE — SYRINGE 10ML LL

## (undated) DEVICE — SINGLE USE SUCTION VALVE MAJ-209: Brand: SINGLE USE SUCTION VALVE (STERILE)

## (undated) DEVICE — CYTOLOGY BRUSH: Brand: CYTOLOGY BRUSH

## (undated) DEVICE — FIRST STEP BEDSIDE KIT - STAND-UP POUCH, ENDOSCOPIC CLEANING PAD - 1 POUCH: Brand: FIRST STEP BEDSIDE KIT - STAND-UP POUCH, ENDOSCOPIC CLEANING PAD

## (undated) DEVICE — STERILE EMESIS BASIN                 070: Brand: CARDINAL HEALTH

## (undated) DEVICE — UTILITY MARKER,BLACK WITH LABELS: Brand: DEVON

## (undated) DEVICE — BRONCHOSCOPE SHEATH VALVE: Brand: BRONCHOSCOPE SHEATH VALVE

## (undated) DEVICE — ADAPTOR TRACH SWIVEL

## (undated) DEVICE — BRONCHOSCOPE MONARCH SNGL USE

## (undated) DEVICE — HEAVY DUTY TABLE COVER: Brand: CONVERTORS

## (undated) DEVICE — NAVIGATION PATIENT PATCHES (QUANTITY OF 60): Brand: NAVIGATION PATIENT PATCHES

## (undated) DEVICE — SPECIMEN CONTAINER STERILE PEEL PACK

## (undated) DEVICE — Device: Brand: SINGLE USE ASPIRATION NEEDLE NA-U200H

## (undated) DEVICE — TUBING SUCTION 5MM X 12 FT

## (undated) DEVICE — SINGLE USE BIOPSY VALVE MAJ-210: Brand: SINGLE USE BIOPSY VALVE (STERILE)

## (undated) DEVICE — GAUZE SPONGES,16 PLY: Brand: CURITY

## (undated) DEVICE — DEVON™ KNEE AND BODY STRAP 80" X 4" (1.5 M X 10.2 CM): Brand: CARDINAL HEALTH

## (undated) DEVICE — Device: Brand: BALLOON

## (undated) DEVICE — STOPCOCK 4-WAY